# Patient Record
Sex: MALE | Race: WHITE | NOT HISPANIC OR LATINO | Employment: FULL TIME | URBAN - METROPOLITAN AREA
[De-identification: names, ages, dates, MRNs, and addresses within clinical notes are randomized per-mention and may not be internally consistent; named-entity substitution may affect disease eponyms.]

---

## 2018-10-16 ENCOUNTER — OFFICE VISIT (OUTPATIENT)
Dept: INTERNAL MEDICINE | Facility: CLINIC | Age: 43
End: 2018-10-16
Payer: COMMERCIAL

## 2018-10-16 DIAGNOSIS — K21.9 GASTROESOPHAGEAL REFLUX DISEASE, ESOPHAGITIS PRESENCE NOT SPECIFIED: ICD-10-CM

## 2018-10-16 DIAGNOSIS — Z00.00 ROUTINE GENERAL MEDICAL EXAMINATION AT A HEALTH CARE FACILITY: Primary | ICD-10-CM

## 2018-10-16 DIAGNOSIS — J30.9 ALLERGIC RHINITIS, UNSPECIFIED SEASONALITY, UNSPECIFIED TRIGGER: ICD-10-CM

## 2018-10-16 DIAGNOSIS — J06.9 UPPER RESPIRATORY TRACT INFECTION, UNSPECIFIED TYPE: ICD-10-CM

## 2018-10-16 DIAGNOSIS — E66.9 OBESITY (BMI 30-39.9): ICD-10-CM

## 2018-10-16 PROCEDURE — 99999 PR PBB SHADOW E&M-NEW PATIENT-LVL III: CPT | Mod: PBBFAC,,, | Performed by: FAMILY MEDICINE

## 2018-10-16 PROCEDURE — 99386 PREV VISIT NEW AGE 40-64: CPT | Mod: S$GLB,,, | Performed by: FAMILY MEDICINE

## 2018-10-16 PROCEDURE — 3079F DIAST BP 80-89 MM HG: CPT | Mod: CPTII,S$GLB,, | Performed by: FAMILY MEDICINE

## 2018-10-16 PROCEDURE — 3074F SYST BP LT 130 MM HG: CPT | Mod: CPTII,S$GLB,, | Performed by: FAMILY MEDICINE

## 2018-10-16 RX ORDER — OMEPRAZOLE 40 MG/1
40 CAPSULE, DELAYED RELEASE ORAL EVERY MORNING
Qty: 90 CAPSULE | Refills: 3 | Status: SHIPPED | OUTPATIENT
Start: 2018-10-16 | End: 2019-10-07 | Stop reason: SDUPTHER

## 2018-10-16 RX ORDER — ALBUTEROL SULFATE 90 UG/1
2 AEROSOL, METERED RESPIRATORY (INHALATION) EVERY 6 HOURS PRN
Qty: 18 G | Refills: 5 | Status: SHIPPED | OUTPATIENT
Start: 2018-10-16 | End: 2019-10-23

## 2018-10-19 VITALS
HEART RATE: 110 BPM | HEIGHT: 71 IN | RESPIRATION RATE: 18 BRPM | BODY MASS INDEX: 31.89 KG/M2 | WEIGHT: 227.75 LBS | DIASTOLIC BLOOD PRESSURE: 82 MMHG | TEMPERATURE: 99 F | SYSTOLIC BLOOD PRESSURE: 124 MMHG

## 2018-10-19 PROBLEM — E66.9 OBESITY (BMI 30-39.9): Status: ACTIVE | Noted: 2018-10-19

## 2018-10-19 PROBLEM — J30.9 ALLERGIC RHINITIS: Status: ACTIVE | Noted: 2018-10-19

## 2018-10-19 PROBLEM — K21.9 GASTROESOPHAGEAL REFLUX DISEASE: Status: ACTIVE | Noted: 2018-10-19

## 2018-10-19 NOTE — PROGRESS NOTES
Subjective:   Patient ID: Dev Sweeney is a 43 y.o. male.    Chief Complaint: Establish Care; Annual Exam; and Cough      HPI  44 yo male here for annual. Also having cough for a few days    Patient queried and denies any further complaints    PREVENTIVE MED  Diet  Exercise  Colorectal Ca  Alcohol use  Tobacco  BP  Depression  Type 2 DM  Hep C  STD  Vision  ALL REVIEWED      PAST MEDICAL HISTORY:  Past Medical History:   Diagnosis Date    Allergy     Asthma     Hypertension        PAST SURGICAL HISTORY:  Past Surgical History:   Procedure Laterality Date    FRACTURE SURGERY      HERNIA REPAIR      ORBITAL FRACTURE SURGERY      left    VASECTOMY         SOCIAL HISTORY:  Social History     Socioeconomic History    Marital status:      Spouse name: Not on file    Number of children: Not on file    Years of education: Not on file    Highest education level: Not on file   Social Needs    Financial resource strain: Not on file    Food insecurity - worry: Not on file    Food insecurity - inability: Not on file    Transportation needs - medical: Not on file    Transportation needs - non-medical: Not on file   Occupational History    Not on file   Tobacco Use    Smoking status: Current Some Day Smoker     Packs/day: 1.00     Years: 15.00     Pack years: 15.00    Smokeless tobacco: Never Used    Tobacco comment: trying to quit   Substance and Sexual Activity    Alcohol use: Yes     Alcohol/week: 0.6 oz     Types: 1 Shots of liquor per week    Drug use: No    Sexual activity: Not on file   Other Topics Concern    Not on file   Social History Narrative    Not on file       FAMILY HISTORY:  Family History   Problem Relation Age of Onset    Heart disease Mother     Cancer Paternal Grandmother     Diabetes Paternal Grandmother     Cancer Paternal Grandfather     Colon polyps Neg Hx        ALLERGIES AND MEDICATIONS: updated and reviewed.  Review of patient's allergies indicates:    Allergen Reactions    Penicillins Anaphylaxis     Other reaction(s): Hives  Other reaction(s): Difficulty breathing       Current Outpatient Medications:     omeprazole (PRILOSEC) 40 MG capsule, Take 1 capsule (40 mg total) by mouth every morning., Disp: 90 capsule, Rfl: 3    albuterol (PROVENTIL/VENTOLIN HFA) 90 mcg/actuation inhaler, Inhale 2 puffs into the lungs every 6 (six) hours as needed for Wheezing. Rescue, Disp: 18 g, Rfl: 5    Review of Systems   Constitutional: Negative for activity change, appetite change, chills, diaphoresis, fatigue, fever and unexpected weight change.   HENT: Negative for congestion, ear discharge, ear pain, facial swelling, hearing loss, nosebleeds, postnasal drip, rhinorrhea, sinus pressure, sneezing, sore throat, tinnitus, trouble swallowing and voice change.    Eyes: Negative for photophobia, pain, discharge, redness, itching and visual disturbance.   Respiratory: Positive for cough. Negative for chest tightness, shortness of breath and wheezing.    Cardiovascular: Negative for chest pain, palpitations and leg swelling.   Gastrointestinal: Negative for abdominal distention, abdominal pain, anal bleeding, blood in stool, constipation, diarrhea, nausea, rectal pain and vomiting.   Endocrine: Negative for cold intolerance, heat intolerance, polydipsia, polyphagia and polyuria.   Genitourinary: Negative for difficulty urinating, dysuria and flank pain.   Musculoskeletal: Negative for arthralgias, back pain, joint swelling, myalgias and neck pain.   Skin: Negative for rash.   Neurological: Negative for dizziness, tremors, seizures, syncope, speech difficulty, weakness, light-headedness, numbness and headaches.   Psychiatric/Behavioral: Negative for behavioral problems, confusion, decreased concentration, dysphoric mood, sleep disturbance and suicidal ideas. The patient is not nervous/anxious and is not hyperactive.        Objective:     Vitals:    10/16/18 1019   BP: 124/82  "  Pulse: 110   Resp: 18   Temp: 98.8 °F (37.1 °C)   TempSrc: Oral   Weight: 103.3 kg (227 lb 11.8 oz)   Height: 5' 11" (1.803 m)   PainSc: 0-No pain     Body mass index is 31.76 kg/m².    Physical Exam   Constitutional: He appears well-developed and well-nourished.   HENT:   Head: Normocephalic and atraumatic.   Right Ear: Hearing, tympanic membrane, external ear and ear canal normal. No drainage or swelling. No decreased hearing is noted.   Left Ear: Hearing, tympanic membrane, external ear and ear canal normal. No drainage or swelling. No decreased hearing is noted.   Nose: Nose normal. No rhinorrhea.   Mouth/Throat: Oropharynx is clear and moist. No oropharyngeal exudate, posterior oropharyngeal edema or posterior oropharyngeal erythema.   Eyes: Conjunctivae, EOM and lids are normal. Pupils are equal, round, and reactive to light. Right eye exhibits no discharge and no exudate. Left eye exhibits no discharge and no exudate. Right conjunctiva is not injected. Left conjunctiva is not injected.   Neck: Trachea normal and full passive range of motion without pain. Normal carotid pulses, no hepatojugular reflux and no JVD present. Carotid bruit is not present. No neck rigidity. No edema and no erythema present. No thyroid mass and no thyromegaly present.   Cardiovascular: Normal rate, regular rhythm and normal heart sounds.   Pulmonary/Chest: Effort normal. No respiratory distress.   Abdominal: Soft. Normal appearance and bowel sounds are normal. There is no tenderness. There is negative Dow's sign.   Lymphadenopathy:     He has no cervical adenopathy.   Neurological: He is alert.   Skin: Skin is warm and dry.   Psychiatric: He has a normal mood and affect. His speech is normal and behavior is normal.   Nursing note and vitals reviewed.      Assessment and Plan:   Dev was seen today for establish care, annual exam and cough.    Diagnoses and all orders for this visit:    Routine general medical examination at a " health care facility  -     CBC auto differential; Future  -     Comprehensive metabolic panel; Future  -     Hemoglobin A1c; Future  -     Lipid panel; Future  -     TSH; Future  -     T4, free; Future  -     Vitamin D; Future    Allergic rhinitis, unspecified seasonality, unspecified trigger    Upper respiratory tract infection, unspecified type    Gastroesophageal reflux disease, esophagitis presence not specified    Obesity (BMI 30-39.9)    Other orders  -     omeprazole (PRILOSEC) 40 MG capsule; Take 1 capsule (40 mg total) by mouth every morning.  -     albuterol (PROVENTIL/VENTOLIN HFA) 90 mcg/actuation inhaler; Inhale 2 puffs into the lungs every 6 (six) hours as needed for Wheezing. Rescue        Follow-up in about 6 months (around 4/16/2019).    Pt has been given instructions populated from [employer] patient information database and has verbalized understanding of the after-visit summary (AVS) and information contained therein.    THIS NOTE WILL BE SHARED WITH THE PATIENT.

## 2018-10-22 ENCOUNTER — LAB VISIT (OUTPATIENT)
Dept: LAB | Facility: HOSPITAL | Age: 43
End: 2018-10-22
Attending: FAMILY MEDICINE
Payer: COMMERCIAL

## 2018-10-22 DIAGNOSIS — Z00.00 ROUTINE GENERAL MEDICAL EXAMINATION AT A HEALTH CARE FACILITY: ICD-10-CM

## 2018-10-22 LAB
25(OH)D3+25(OH)D2 SERPL-MCNC: 25 NG/ML
ALBUMIN SERPL BCP-MCNC: 3.8 G/DL
ALP SERPL-CCNC: 53 U/L
ALT SERPL W/O P-5'-P-CCNC: 49 U/L
ANION GAP SERPL CALC-SCNC: 8 MMOL/L
AST SERPL-CCNC: 51 U/L
BASOPHILS # BLD AUTO: 0.07 K/UL
BASOPHILS NFR BLD: 1 %
BILIRUB SERPL-MCNC: 0.7 MG/DL
BUN SERPL-MCNC: 8 MG/DL
CALCIUM SERPL-MCNC: 9.5 MG/DL
CHLORIDE SERPL-SCNC: 104 MMOL/L
CHOLEST SERPL-MCNC: 295 MG/DL
CHOLEST/HDLC SERPL: 10.9 {RATIO}
CO2 SERPL-SCNC: 27 MMOL/L
CREAT SERPL-MCNC: 0.9 MG/DL
DIFFERENTIAL METHOD: ABNORMAL
EOSINOPHIL # BLD AUTO: 0.1 K/UL
EOSINOPHIL NFR BLD: 1.3 %
ERYTHROCYTE [DISTWIDTH] IN BLOOD BY AUTOMATED COUNT: 17.3 %
EST. GFR  (AFRICAN AMERICAN): >60 ML/MIN/1.73 M^2
EST. GFR  (NON AFRICAN AMERICAN): >60 ML/MIN/1.73 M^2
ESTIMATED AVG GLUCOSE: 91 MG/DL
GLUCOSE SERPL-MCNC: 93 MG/DL
HBA1C MFR BLD HPLC: 4.8 %
HCT VFR BLD AUTO: 53.1 %
HDLC SERPL-MCNC: 27 MG/DL
HDLC SERPL: 9.2 %
HGB BLD-MCNC: 17 G/DL
IMM GRANULOCYTES # BLD AUTO: 0.02 K/UL
IMM GRANULOCYTES NFR BLD AUTO: 0.3 %
LDLC SERPL CALC-MCNC: 221.2 MG/DL
LYMPHOCYTES # BLD AUTO: 1 K/UL
LYMPHOCYTES NFR BLD: 15.4 %
MCH RBC QN AUTO: 28.2 PG
MCHC RBC AUTO-ENTMCNC: 32 G/DL
MCV RBC AUTO: 88 FL
MONOCYTES # BLD AUTO: 0.6 K/UL
MONOCYTES NFR BLD: 8.6 %
NEUTROPHILS # BLD AUTO: 4.9 K/UL
NEUTROPHILS NFR BLD: 73.4 %
NONHDLC SERPL-MCNC: 268 MG/DL
NRBC BLD-RTO: 0 /100 WBC
PLATELET # BLD AUTO: 339 K/UL
PMV BLD AUTO: 10.2 FL
POTASSIUM SERPL-SCNC: 4.6 MMOL/L
PROT SERPL-MCNC: 7.6 G/DL
RBC # BLD AUTO: 6.02 M/UL
SODIUM SERPL-SCNC: 139 MMOL/L
T4 FREE SERPL-MCNC: 0.66 NG/DL
TRIGL SERPL-MCNC: 234 MG/DL
TSH SERPL DL<=0.005 MIU/L-ACNC: 0.78 UIU/ML
WBC # BLD AUTO: 6.74 K/UL

## 2018-10-22 PROCEDURE — 84439 ASSAY OF FREE THYROXINE: CPT

## 2018-10-22 PROCEDURE — 83036 HEMOGLOBIN GLYCOSYLATED A1C: CPT

## 2018-10-22 PROCEDURE — 80061 LIPID PANEL: CPT

## 2018-10-22 PROCEDURE — 85025 COMPLETE CBC W/AUTO DIFF WBC: CPT

## 2018-10-22 PROCEDURE — 82306 VITAMIN D 25 HYDROXY: CPT

## 2018-10-22 PROCEDURE — 84443 ASSAY THYROID STIM HORMONE: CPT

## 2018-10-22 PROCEDURE — 80053 COMPREHEN METABOLIC PANEL: CPT

## 2018-10-22 PROCEDURE — 36415 COLL VENOUS BLD VENIPUNCTURE: CPT | Mod: PO

## 2018-10-25 ENCOUNTER — TELEPHONE (OUTPATIENT)
Dept: INTERNAL MEDICINE | Facility: CLINIC | Age: 43
End: 2018-10-25

## 2018-10-25 DIAGNOSIS — R79.89 ABNORMAL THYROID BLOOD TEST: Primary | ICD-10-CM

## 2018-10-25 RX ORDER — ROSUVASTATIN CALCIUM 10 MG/1
10 TABLET, COATED ORAL DAILY
Qty: 90 TABLET | Refills: 1 | Status: SHIPPED | OUTPATIENT
Start: 2018-10-25 | End: 2019-04-02 | Stop reason: SDUPTHER

## 2018-10-25 NOTE — TELEPHONE ENCOUNTER
"----- Message from Vincent Ramos MD sent at 10/25/2018  1:30 PM CDT -----  Have not received ROMEL response from pt. Please give him a call. Thx  "Mr. Sweeney, Were these labs done truly fasting? Your LDL cholesterol appears very, very high. One of your thyroid tests was off a bit, too. If these were done fasting, we should definitely consider possible cholesterol meds. Regardless, we need to repeat non-fasting thyroid studies in about a week. Please let me know your thoughts. Thank you. Lindsey "  "

## 2018-10-25 NOTE — TELEPHONE ENCOUNTER
Pt called- he saw message last night late.     He has no objections to starting cholesterol medication.      [place] goes [place] around the 1st,so he will call back and do his thyroid lab when he gets back.

## 2018-10-30 ENCOUNTER — PATIENT MESSAGE (OUTPATIENT)
Dept: INTERNAL MEDICINE | Facility: CLINIC | Age: 43
End: 2018-10-30

## 2018-11-14 ENCOUNTER — LAB VISIT (OUTPATIENT)
Dept: LAB | Facility: HOSPITAL | Age: 43
End: 2018-11-14
Attending: FAMILY MEDICINE
Payer: COMMERCIAL

## 2018-11-14 DIAGNOSIS — R79.89 ABNORMAL THYROID BLOOD TEST: ICD-10-CM

## 2018-11-14 LAB
T4 FREE SERPL-MCNC: 0.92 NG/DL
TSH SERPL DL<=0.005 MIU/L-ACNC: 1.38 UIU/ML

## 2018-11-14 PROCEDURE — 84439 ASSAY OF FREE THYROXINE: CPT

## 2018-11-14 PROCEDURE — 84443 ASSAY THYROID STIM HORMONE: CPT

## 2018-11-14 PROCEDURE — 36415 COLL VENOUS BLD VENIPUNCTURE: CPT | Mod: PO

## 2019-03-29 ENCOUNTER — OFFICE VISIT (OUTPATIENT)
Dept: INTERNAL MEDICINE | Facility: CLINIC | Age: 44
End: 2019-03-29
Payer: COMMERCIAL

## 2019-03-29 VITALS
HEART RATE: 111 BPM | WEIGHT: 231.94 LBS | SYSTOLIC BLOOD PRESSURE: 120 MMHG | RESPIRATION RATE: 14 BRPM | DIASTOLIC BLOOD PRESSURE: 76 MMHG | TEMPERATURE: 98 F | BODY MASS INDEX: 32.35 KG/M2 | OXYGEN SATURATION: 97 %

## 2019-03-29 DIAGNOSIS — J02.9 PHARYNGITIS, UNSPECIFIED ETIOLOGY: Primary | ICD-10-CM

## 2019-03-29 PROCEDURE — 3078F PR MOST RECENT DIASTOLIC BLOOD PRESSURE < 80 MM HG: ICD-10-PCS | Mod: CPTII,S$GLB,, | Performed by: FAMILY MEDICINE

## 2019-03-29 PROCEDURE — 96372 PR INJECTION,THERAP/PROPH/DIAG2ST, IM OR SUBCUT: ICD-10-PCS | Mod: S$GLB,,, | Performed by: FAMILY MEDICINE

## 2019-03-29 PROCEDURE — 3008F BODY MASS INDEX DOCD: CPT | Mod: CPTII,S$GLB,, | Performed by: FAMILY MEDICINE

## 2019-03-29 PROCEDURE — 3074F SYST BP LT 130 MM HG: CPT | Mod: CPTII,S$GLB,, | Performed by: FAMILY MEDICINE

## 2019-03-29 PROCEDURE — 99213 OFFICE O/P EST LOW 20 MIN: CPT | Mod: 25,S$GLB,, | Performed by: FAMILY MEDICINE

## 2019-03-29 PROCEDURE — 3078F DIAST BP <80 MM HG: CPT | Mod: CPTII,S$GLB,, | Performed by: FAMILY MEDICINE

## 2019-03-29 PROCEDURE — 96372 THER/PROPH/DIAG INJ SC/IM: CPT | Mod: S$GLB,,, | Performed by: FAMILY MEDICINE

## 2019-03-29 PROCEDURE — 99213 PR OFFICE/OUTPT VISIT, EST, LEVL III, 20-29 MIN: ICD-10-PCS | Mod: 25,S$GLB,, | Performed by: FAMILY MEDICINE

## 2019-03-29 PROCEDURE — 3074F PR MOST RECENT SYSTOLIC BLOOD PRESSURE < 130 MM HG: ICD-10-PCS | Mod: CPTII,S$GLB,, | Performed by: FAMILY MEDICINE

## 2019-03-29 PROCEDURE — 99999 PR PBB SHADOW E&M-EST. PATIENT-LVL III: ICD-10-PCS | Mod: PBBFAC,,, | Performed by: FAMILY MEDICINE

## 2019-03-29 PROCEDURE — 3008F PR BODY MASS INDEX (BMI) DOCUMENTED: ICD-10-PCS | Mod: CPTII,S$GLB,, | Performed by: FAMILY MEDICINE

## 2019-03-29 PROCEDURE — 99999 PR PBB SHADOW E&M-EST. PATIENT-LVL III: CPT | Mod: PBBFAC,,, | Performed by: FAMILY MEDICINE

## 2019-03-29 RX ORDER — HYDROCODONE BITARTRATE AND ACETAMINOPHEN 7.5; 325 MG/1; MG/1
1 TABLET ORAL EVERY 6 HOURS PRN
Qty: 28 TABLET | Refills: 0 | Status: SHIPPED | OUTPATIENT
Start: 2019-03-29 | End: 2020-02-13 | Stop reason: DRUGHIGH

## 2019-03-29 RX ORDER — METHYLPREDNISOLONE 4 MG/1
TABLET ORAL
Qty: 1 PACKAGE | Refills: 0 | Status: SHIPPED | OUTPATIENT
Start: 2019-03-29 | End: 2019-04-02

## 2019-03-29 RX ORDER — AZITHROMYCIN 250 MG/1
TABLET, FILM COATED ORAL
Qty: 6 TABLET | Refills: 0 | Status: SHIPPED | OUTPATIENT
Start: 2019-03-29 | End: 2019-04-02

## 2019-03-29 RX ORDER — TRIAMCINOLONE ACETONIDE 40 MG/ML
40 INJECTION, SUSPENSION INTRA-ARTICULAR; INTRAMUSCULAR
Status: COMPLETED | OUTPATIENT
Start: 2019-03-29 | End: 2019-03-29

## 2019-03-29 RX ADMIN — TRIAMCINOLONE ACETONIDE 40 MG: 40 INJECTION, SUSPENSION INTRA-ARTICULAR; INTRAMUSCULAR at 02:03

## 2019-03-30 NOTE — PROGRESS NOTES
Subjective:   Patient ID: Dev Sweeney is a 43 y.o. male.    Chief Complaint: Sore Throat      Sore Throat    This is a new problem. The current episode started 1 to 4 weeks ago. The problem has been gradually worsening. Neither side of throat is experiencing more pain than the other. There has been no fever. The pain is at a severity of 7/10. The patient is experiencing no pain. Associated symptoms include congestion, ear pain and a hoarse voice. Pertinent negatives include no abdominal pain, coughing, diarrhea, ear discharge, headaches, neck pain, shortness of breath or vomiting. He has tried nothing for the symptoms. The treatment provided no relief.     42 yo male here for sore throat  Patient queried and denies any further complaints.      ALLERGIES AND MEDICATIONS: updated and reviewed.  Review of patient's allergies indicates:   Allergen Reactions    Penicillins Anaphylaxis     Other reaction(s): Hives  Other reaction(s): Difficulty breathing       Current Outpatient Medications:     albuterol (PROVENTIL/VENTOLIN HFA) 90 mcg/actuation inhaler, Inhale 2 puffs into the lungs every 6 (six) hours as needed for Wheezing. Rescue, Disp: 18 g, Rfl: 5    omeprazole (PRILOSEC) 40 MG capsule, Take 1 capsule (40 mg total) by mouth every morning., Disp: 90 capsule, Rfl: 3    rosuvastatin (CRESTOR) 10 MG tablet, Take 1 tablet (10 mg total) by mouth once daily., Disp: 90 tablet, Rfl: 1    azithromycin (Z-CHET) 250 MG tablet, Take 2 tablets by mouth on day 1; Take 1 tablet by mouth on days 2-5, Disp: 6 tablet, Rfl: 0    HYDROcodone-acetaminophen (NORCO) 7.5-325 mg per tablet, Take 1 tablet by mouth every 6 (six) hours as needed., Disp: 28 tablet, Rfl: 0    methylPREDNISolone (MEDROL DOSEPACK) 4 mg tablet, use as directed, Disp: 1 Package, Rfl: 0    Review of Systems   Constitutional: Negative for activity change, appetite change, chills, diaphoresis, fatigue, fever and unexpected weight change.   HENT: Positive  for congestion, ear pain, hoarse voice and sore throat. Negative for ear discharge, postnasal drip, rhinorrhea and sneezing.    Eyes: Negative for photophobia and discharge.   Respiratory: Negative for cough, chest tightness, shortness of breath and wheezing.    Cardiovascular: Negative for chest pain and palpitations.   Gastrointestinal: Negative for abdominal distention, abdominal pain, diarrhea, nausea and vomiting.   Genitourinary: Negative for dysuria.   Musculoskeletal: Negative for arthralgias and neck pain.   Skin: Negative for rash.   Neurological: Negative for headaches.       Objective:     Vitals:    03/29/19 1355   BP: 120/76   Pulse: (!) 111   Resp: 14   Temp: 98.2 °F (36.8 °C)   TempSrc: Oral   SpO2: 97%   Weight: 105.2 kg (231 lb 14.8 oz)   PainSc: 0-No pain     Body mass index is 32.35 kg/m².    Physical Exam   Constitutional: He appears well-developed and well-nourished.   HENT:   Head: Normocephalic and atraumatic.   Right Ear: Hearing, tympanic membrane, external ear and ear canal normal. No drainage or swelling. No decreased hearing is noted.   Left Ear: Hearing, tympanic membrane, external ear and ear canal normal. No drainage or swelling. No decreased hearing is noted.   Nose: Nose normal. No rhinorrhea.   Mouth/Throat: Oropharynx is clear and moist. No oropharyngeal exudate, posterior oropharyngeal edema or posterior oropharyngeal erythema.   Eyes: Pupils are equal, round, and reactive to light. Conjunctivae, EOM and lids are normal. Right eye exhibits no discharge and no exudate. Left eye exhibits no discharge and no exudate. Right conjunctiva is not injected. Left conjunctiva is not injected.   Neck: Trachea normal and full passive range of motion without pain. Normal carotid pulses, no hepatojugular reflux and no JVD present. Carotid bruit is not present. No neck rigidity. No edema and no erythema present. No thyroid mass and no thyromegaly present.   Cardiovascular: Normal rate, regular  rhythm and normal heart sounds.   Pulmonary/Chest: Effort normal. No respiratory distress.   Abdominal: Soft. Normal appearance and bowel sounds are normal. There is no tenderness. There is negative Dow's sign.   Lymphadenopathy:     He has no cervical adenopathy.   Neurological: He is alert.   Skin: Skin is warm and dry.   Psychiatric: He has a normal mood and affect. His speech is normal and behavior is normal.   Nursing note and vitals reviewed.      Assessment and Plan:   Dev was seen today for sore throat.    Diagnoses and all orders for this visit:    Pharyngitis, unspecified etiology    Other orders  -     triamcinolone acetonide injection 40 mg  -     methylPREDNISolone (MEDROL DOSEPACK) 4 mg tablet; use as directed  -     azithromycin (Z-CHET) 250 MG tablet; Take 2 tablets by mouth on day 1; Take 1 tablet by mouth on days 2-5  -     HYDROcodone-acetaminophen (NORCO) 7.5-325 mg per tablet; Take 1 tablet by mouth every 6 (six) hours as needed.        Follow up in about 2 weeks (around 4/12/2019), or if symptoms worsen or fail to improve.    THIS NOTE WILL BE SHARED WITH THE PATIENT.

## 2019-04-02 ENCOUNTER — PATIENT MESSAGE (OUTPATIENT)
Dept: INTERNAL MEDICINE | Facility: CLINIC | Age: 44
End: 2019-04-02

## 2019-04-02 DIAGNOSIS — R79.89 ABNORMAL TSH: ICD-10-CM

## 2019-04-02 DIAGNOSIS — E78.5 HYPERLIPIDEMIA, UNSPECIFIED HYPERLIPIDEMIA TYPE: Primary | ICD-10-CM

## 2019-04-02 DIAGNOSIS — R79.89 ELEVATED LFTS: ICD-10-CM

## 2019-04-02 RX ORDER — ROSUVASTATIN CALCIUM 10 MG/1
TABLET, COATED ORAL
Qty: 90 TABLET | Refills: 0 | Status: SHIPPED | OUTPATIENT
Start: 2019-04-02 | End: 2019-07-02 | Stop reason: SDUPTHER

## 2019-04-20 ENCOUNTER — PATIENT MESSAGE (OUTPATIENT)
Dept: INTERNAL MEDICINE | Facility: CLINIC | Age: 44
End: 2019-04-20

## 2019-05-01 ENCOUNTER — LAB VISIT (OUTPATIENT)
Dept: LAB | Facility: HOSPITAL | Age: 44
End: 2019-05-01
Attending: FAMILY MEDICINE
Payer: COMMERCIAL

## 2019-05-01 DIAGNOSIS — E78.5 HYPERLIPIDEMIA, UNSPECIFIED HYPERLIPIDEMIA TYPE: ICD-10-CM

## 2019-05-01 DIAGNOSIS — R79.89 ABNORMAL TSH: ICD-10-CM

## 2019-05-01 DIAGNOSIS — R79.89 ELEVATED LFTS: ICD-10-CM

## 2019-05-01 LAB
ALBUMIN SERPL BCP-MCNC: 4.1 G/DL (ref 3.5–5.2)
ALP SERPL-CCNC: 53 U/L (ref 55–135)
ALT SERPL W/O P-5'-P-CCNC: 49 U/L (ref 10–44)
ANION GAP SERPL CALC-SCNC: 10 MMOL/L (ref 8–16)
AST SERPL-CCNC: 39 U/L (ref 10–40)
BILIRUB SERPL-MCNC: 0.5 MG/DL (ref 0.1–1)
BUN SERPL-MCNC: 9 MG/DL (ref 6–20)
CALCIUM SERPL-MCNC: 9.7 MG/DL (ref 8.7–10.5)
CHLORIDE SERPL-SCNC: 104 MMOL/L (ref 95–110)
CHOLEST SERPL-MCNC: 131 MG/DL (ref 120–199)
CHOLEST/HDLC SERPL: 3.2 {RATIO} (ref 2–5)
CO2 SERPL-SCNC: 27 MMOL/L (ref 23–29)
CREAT SERPL-MCNC: 0.9 MG/DL (ref 0.5–1.4)
EST. GFR  (AFRICAN AMERICAN): >60 ML/MIN/1.73 M^2
EST. GFR  (NON AFRICAN AMERICAN): >60 ML/MIN/1.73 M^2
GLUCOSE SERPL-MCNC: 73 MG/DL (ref 70–110)
HAV IGM SERPL QL IA: NEGATIVE
HBV CORE IGM SERPL QL IA: NEGATIVE
HBV SURFACE AG SERPL QL IA: NEGATIVE
HCV AB SERPL QL IA: NEGATIVE
HDLC SERPL-MCNC: 41 MG/DL (ref 40–75)
HDLC SERPL: 31.3 % (ref 20–50)
LDLC SERPL CALC-MCNC: 76.8 MG/DL (ref 63–159)
NONHDLC SERPL-MCNC: 90 MG/DL
POTASSIUM SERPL-SCNC: 4.4 MMOL/L (ref 3.5–5.1)
PROT SERPL-MCNC: 7.4 G/DL (ref 6–8.4)
SODIUM SERPL-SCNC: 141 MMOL/L (ref 136–145)
T4 FREE SERPL-MCNC: 0.8 NG/DL (ref 0.71–1.51)
TRIGL SERPL-MCNC: 66 MG/DL (ref 30–150)
TSH SERPL DL<=0.005 MIU/L-ACNC: 0.55 UIU/ML (ref 0.4–4)

## 2019-05-01 PROCEDURE — 80074 ACUTE HEPATITIS PANEL: CPT

## 2019-05-01 PROCEDURE — 36415 COLL VENOUS BLD VENIPUNCTURE: CPT | Mod: PO

## 2019-05-01 PROCEDURE — 84443 ASSAY THYROID STIM HORMONE: CPT

## 2019-05-01 PROCEDURE — 84439 ASSAY OF FREE THYROXINE: CPT

## 2019-05-01 PROCEDURE — 80053 COMPREHEN METABOLIC PANEL: CPT

## 2019-05-01 PROCEDURE — 80061 LIPID PANEL: CPT

## 2019-07-02 RX ORDER — ROSUVASTATIN CALCIUM 10 MG/1
TABLET, COATED ORAL
Qty: 90 TABLET | Refills: 0 | Status: SHIPPED | OUTPATIENT
Start: 2019-07-02 | End: 2019-10-23 | Stop reason: SDUPTHER

## 2019-07-25 ENCOUNTER — OFFICE VISIT (OUTPATIENT)
Dept: INTERNAL MEDICINE | Facility: CLINIC | Age: 44
End: 2019-07-25
Payer: COMMERCIAL

## 2019-07-25 VITALS
WEIGHT: 220.88 LBS | TEMPERATURE: 98 F | SYSTOLIC BLOOD PRESSURE: 126 MMHG | DIASTOLIC BLOOD PRESSURE: 82 MMHG | HEIGHT: 71 IN | BODY MASS INDEX: 30.92 KG/M2 | HEART RATE: 94 BPM

## 2019-07-25 DIAGNOSIS — E78.00 HYPERCHOLESTEREMIA: Primary | ICD-10-CM

## 2019-07-25 DIAGNOSIS — G47.00 INSOMNIA, UNSPECIFIED TYPE: ICD-10-CM

## 2019-07-25 DIAGNOSIS — E66.9 OBESITY (BMI 30-39.9): ICD-10-CM

## 2019-07-25 DIAGNOSIS — F17.200 TOBACCO DEPENDENCE: ICD-10-CM

## 2019-07-25 PROCEDURE — 3008F PR BODY MASS INDEX (BMI) DOCUMENTED: ICD-10-PCS | Mod: CPTII,S$GLB,, | Performed by: INTERNAL MEDICINE

## 2019-07-25 PROCEDURE — 99999 PR PBB SHADOW E&M-EST. PATIENT-LVL III: ICD-10-PCS | Mod: PBBFAC,,, | Performed by: INTERNAL MEDICINE

## 2019-07-25 PROCEDURE — 3079F PR MOST RECENT DIASTOLIC BLOOD PRESSURE 80-89 MM HG: ICD-10-PCS | Mod: CPTII,S$GLB,, | Performed by: INTERNAL MEDICINE

## 2019-07-25 PROCEDURE — 99214 OFFICE O/P EST MOD 30 MIN: CPT | Mod: S$GLB,,, | Performed by: INTERNAL MEDICINE

## 2019-07-25 PROCEDURE — 99214 PR OFFICE/OUTPT VISIT, EST, LEVL IV, 30-39 MIN: ICD-10-PCS | Mod: S$GLB,,, | Performed by: INTERNAL MEDICINE

## 2019-07-25 PROCEDURE — 3008F BODY MASS INDEX DOCD: CPT | Mod: CPTII,S$GLB,, | Performed by: INTERNAL MEDICINE

## 2019-07-25 PROCEDURE — 3074F PR MOST RECENT SYSTOLIC BLOOD PRESSURE < 130 MM HG: ICD-10-PCS | Mod: CPTII,S$GLB,, | Performed by: INTERNAL MEDICINE

## 2019-07-25 PROCEDURE — 99999 PR PBB SHADOW E&M-EST. PATIENT-LVL III: CPT | Mod: PBBFAC,,, | Performed by: INTERNAL MEDICINE

## 2019-07-25 PROCEDURE — 3079F DIAST BP 80-89 MM HG: CPT | Mod: CPTII,S$GLB,, | Performed by: INTERNAL MEDICINE

## 2019-07-25 PROCEDURE — 3074F SYST BP LT 130 MM HG: CPT | Mod: CPTII,S$GLB,, | Performed by: INTERNAL MEDICINE

## 2019-07-25 RX ORDER — TRAZODONE HYDROCHLORIDE 100 MG/1
100 TABLET ORAL NIGHTLY PRN
Qty: 30 TABLET | Refills: 3 | Status: SHIPPED | OUTPATIENT
Start: 2019-07-25 | End: 2019-10-21 | Stop reason: SDUPTHER

## 2019-07-25 NOTE — PROGRESS NOTES
Subjective:       Patient ID: Dev Sweeney is a 44 y.o. male.    Chief Complaint: Follow-up (6 month )    HPI   Pt with HLD, Obesity, Tobacco use is here for establishment. No acute complaints today except for mild insomnia at times.   Review of Systems   Constitutional: Negative for activity change, appetite change, chills, diaphoresis, fatigue, fever and unexpected weight change.   HENT: Negative for hearing loss, postnasal drip, rhinorrhea, sinus pressure, sneezing, sore throat, trouble swallowing and voice change.    Eyes: Negative for discharge and visual disturbance.   Respiratory: Negative for cough, chest tightness, shortness of breath and wheezing.    Cardiovascular: Negative for chest pain, palpitations and leg swelling.   Gastrointestinal: Negative for abdominal pain, blood in stool, constipation, diarrhea, nausea and vomiting.   Endocrine: Negative for polydipsia and polyuria.   Genitourinary: Negative for difficulty urinating, dysuria, hematuria and urgency.   Musculoskeletal: Negative for arthralgias, joint swelling, myalgias and neck pain.   Skin: Negative for rash and wound.   Allergic/Immunologic: Negative for environmental allergies and food allergies.   Neurological: Negative for weakness and headaches.   Hematological: Negative for adenopathy. Does not bruise/bleed easily.   Psychiatric/Behavioral: Positive for sleep disturbance. Negative for confusion, dysphoric mood, self-injury and suicidal ideas.       Objective:      Physical Exam   Constitutional: He is oriented to person, place, and time. He appears well-developed and well-nourished. No distress.   HENT:   Head: Normocephalic and atraumatic.   Right Ear: External ear normal.   Left Ear: External ear normal.   Nose: Nose normal.   Mouth/Throat: Oropharynx is clear and moist. No oropharyngeal exudate.   Eyes: Pupils are equal, round, and reactive to light. Conjunctivae and EOM are normal. Right eye exhibits no discharge. Left eye  exhibits no discharge. No scleral icterus.   Neck: Normal range of motion. Neck supple. No JVD present.   Cardiovascular: Normal rate, regular rhythm and normal heart sounds.   No murmur heard.  Pulmonary/Chest: Effort normal and breath sounds normal. No respiratory distress. He has no wheezes. He has no rales.   Abdominal: Soft. Bowel sounds are normal. He exhibits no mass.   Musculoskeletal: Normal range of motion. He exhibits no edema.   Lymphadenopathy:     He has no cervical adenopathy.   Neurological: He is alert and oriented to person, place, and time. He exhibits normal muscle tone.   Skin: Skin is warm and dry. No rash noted. He is not diaphoretic. No pallor.   Psychiatric: He has a normal mood and affect.   Nursing note and vitals reviewed.      Assessment:       1. Hypercholesteremia    2. Obesity (BMI 30-39.9)    3. Tobacco dependence    4. Insomnia, unspecified type        Plan:    1. HLD- controlled on Crestor 10 mg daily    2. Obesity- pt advised on proper diet/exercise for weight loss   3. Tobacco use- cessation advised   4. Insomnia- Rx Trazodone 100 mg qHS PRN

## 2019-08-15 RX ORDER — ALBUTEROL SULFATE 90 UG/1
2 AEROSOL, METERED RESPIRATORY (INHALATION) EVERY 6 HOURS PRN
Qty: 8.5 INHALER | Refills: 5 | OUTPATIENT
Start: 2019-08-15 | End: 2020-08-14

## 2019-09-27 RX ORDER — ROSUVASTATIN CALCIUM 10 MG/1
TABLET, COATED ORAL
Qty: 90 TABLET | Refills: 0 | OUTPATIENT
Start: 2019-09-27

## 2019-09-29 RX ORDER — OMEPRAZOLE 40 MG/1
CAPSULE, DELAYED RELEASE ORAL
Qty: 90 CAPSULE | Refills: 3 | OUTPATIENT
Start: 2019-09-29

## 2019-10-07 RX ORDER — OMEPRAZOLE 40 MG/1
40 CAPSULE, DELAYED RELEASE ORAL EVERY MORNING
Qty: 90 CAPSULE | Refills: 0 | Status: SHIPPED | OUTPATIENT
Start: 2019-10-07 | End: 2020-01-05

## 2019-10-15 ENCOUNTER — PATIENT MESSAGE (OUTPATIENT)
Dept: INTERNAL MEDICINE | Facility: CLINIC | Age: 44
End: 2019-10-15

## 2019-10-15 DIAGNOSIS — Z00.00 ROUTINE GENERAL MEDICAL EXAMINATION AT A HEALTH CARE FACILITY: Primary | ICD-10-CM

## 2019-10-21 RX ORDER — TRAZODONE HYDROCHLORIDE 100 MG/1
100 TABLET ORAL NIGHTLY PRN
Qty: 90 TABLET | Refills: 1 | Status: SHIPPED | OUTPATIENT
Start: 2019-10-21 | End: 2020-04-14 | Stop reason: SDUPTHER

## 2019-10-22 RX ORDER — ROSUVASTATIN CALCIUM 10 MG/1
TABLET, COATED ORAL
Qty: 90 TABLET | Refills: 0 | OUTPATIENT
Start: 2019-10-22

## 2019-10-23 ENCOUNTER — LAB VISIT (OUTPATIENT)
Dept: LAB | Facility: HOSPITAL | Age: 44
End: 2019-10-23
Attending: INTERNAL MEDICINE
Payer: COMMERCIAL

## 2019-10-23 ENCOUNTER — OFFICE VISIT (OUTPATIENT)
Dept: INTERNAL MEDICINE | Facility: CLINIC | Age: 44
End: 2019-10-23
Payer: COMMERCIAL

## 2019-10-23 VITALS
BODY MASS INDEX: 30.86 KG/M2 | HEIGHT: 71 IN | RESPIRATION RATE: 16 BRPM | DIASTOLIC BLOOD PRESSURE: 84 MMHG | HEART RATE: 93 BPM | WEIGHT: 220.44 LBS | TEMPERATURE: 99 F | SYSTOLIC BLOOD PRESSURE: 132 MMHG

## 2019-10-23 DIAGNOSIS — E78.00 HYPERCHOLESTEREMIA: ICD-10-CM

## 2019-10-23 DIAGNOSIS — F17.200 TOBACCO DEPENDENCE: ICD-10-CM

## 2019-10-23 DIAGNOSIS — Z00.00 ROUTINE GENERAL MEDICAL EXAMINATION AT A HEALTH CARE FACILITY: ICD-10-CM

## 2019-10-23 DIAGNOSIS — E66.9 OBESITY (BMI 30-39.9): ICD-10-CM

## 2019-10-23 DIAGNOSIS — Z00.00 ANNUAL PHYSICAL EXAM: Primary | ICD-10-CM

## 2019-10-23 DIAGNOSIS — G47.00 INSOMNIA, UNSPECIFIED TYPE: ICD-10-CM

## 2019-10-23 LAB
25(OH)D3+25(OH)D2 SERPL-MCNC: 36 NG/ML (ref 30–96)
ALBUMIN SERPL BCP-MCNC: 4.3 G/DL (ref 3.5–5.2)
ALP SERPL-CCNC: 49 U/L (ref 55–135)
ALT SERPL W/O P-5'-P-CCNC: 39 U/L (ref 10–44)
ANION GAP SERPL CALC-SCNC: 8 MMOL/L (ref 8–16)
AST SERPL-CCNC: 47 U/L (ref 10–40)
BASOPHILS # BLD AUTO: 0.07 K/UL (ref 0–0.2)
BASOPHILS NFR BLD: 0.8 % (ref 0–1.9)
BILIRUB SERPL-MCNC: 0.7 MG/DL (ref 0.1–1)
BUN SERPL-MCNC: 9 MG/DL (ref 6–20)
CALCIUM SERPL-MCNC: 9.7 MG/DL (ref 8.7–10.5)
CHLORIDE SERPL-SCNC: 103 MMOL/L (ref 95–110)
CHOLEST SERPL-MCNC: 137 MG/DL (ref 120–199)
CHOLEST/HDLC SERPL: 2.9 {RATIO} (ref 2–5)
CO2 SERPL-SCNC: 30 MMOL/L (ref 23–29)
CREAT SERPL-MCNC: 0.8 MG/DL (ref 0.5–1.4)
DIFFERENTIAL METHOD: ABNORMAL
EOSINOPHIL # BLD AUTO: 0.1 K/UL (ref 0–0.5)
EOSINOPHIL NFR BLD: 1.7 % (ref 0–8)
ERYTHROCYTE [DISTWIDTH] IN BLOOD BY AUTOMATED COUNT: 17.4 % (ref 11.5–14.5)
EST. GFR  (AFRICAN AMERICAN): >60 ML/MIN/1.73 M^2
EST. GFR  (NON AFRICAN AMERICAN): >60 ML/MIN/1.73 M^2
GLUCOSE SERPL-MCNC: 79 MG/DL (ref 70–110)
HCT VFR BLD AUTO: 58.7 % (ref 40–54)
HDLC SERPL-MCNC: 47 MG/DL (ref 40–75)
HDLC SERPL: 34.3 % (ref 20–50)
HGB BLD-MCNC: 18.7 G/DL (ref 14–18)
IMM GRANULOCYTES # BLD AUTO: 0.02 K/UL (ref 0–0.04)
IMM GRANULOCYTES NFR BLD AUTO: 0.2 % (ref 0–0.5)
LDLC SERPL CALC-MCNC: 75.2 MG/DL (ref 63–159)
LYMPHOCYTES # BLD AUTO: 1.5 K/UL (ref 1–4.8)
LYMPHOCYTES NFR BLD: 18.4 % (ref 18–48)
MCH RBC QN AUTO: 29 PG (ref 27–31)
MCHC RBC AUTO-ENTMCNC: 31.9 G/DL (ref 32–36)
MCV RBC AUTO: 91 FL (ref 82–98)
MONOCYTES # BLD AUTO: 0.9 K/UL (ref 0.3–1)
MONOCYTES NFR BLD: 10.9 % (ref 4–15)
NEUTROPHILS # BLD AUTO: 5.6 K/UL (ref 1.8–7.7)
NEUTROPHILS NFR BLD: 68 % (ref 38–73)
NONHDLC SERPL-MCNC: 90 MG/DL
NRBC BLD-RTO: 0 /100 WBC
PLATELET # BLD AUTO: 235 K/UL (ref 150–350)
PMV BLD AUTO: 10.2 FL (ref 9.2–12.9)
POTASSIUM SERPL-SCNC: 4.7 MMOL/L (ref 3.5–5.1)
PROT SERPL-MCNC: 7.2 G/DL (ref 6–8.4)
RBC # BLD AUTO: 6.45 M/UL (ref 4.6–6.2)
SODIUM SERPL-SCNC: 141 MMOL/L (ref 136–145)
TRIGL SERPL-MCNC: 74 MG/DL (ref 30–150)
TSH SERPL DL<=0.005 MIU/L-ACNC: 0.98 UIU/ML (ref 0.4–4)
WBC # BLD AUTO: 8.24 K/UL (ref 3.9–12.7)

## 2019-10-23 PROCEDURE — 3075F PR MOST RECENT SYSTOLIC BLOOD PRESS GE 130-139MM HG: ICD-10-PCS | Mod: CPTII,S$GLB,, | Performed by: INTERNAL MEDICINE

## 2019-10-23 PROCEDURE — 90471 IMMUNIZATION ADMIN: CPT | Mod: S$GLB,,, | Performed by: INTERNAL MEDICINE

## 2019-10-23 PROCEDURE — 90686 FLU VACCINE (QUAD) GREATER THAN OR EQUAL TO 3YO PRESERVATIVE FREE IM: ICD-10-PCS | Mod: S$GLB,,, | Performed by: INTERNAL MEDICINE

## 2019-10-23 PROCEDURE — 99396 PR PREVENTIVE VISIT,EST,40-64: ICD-10-PCS | Mod: 25,S$GLB,, | Performed by: INTERNAL MEDICINE

## 2019-10-23 PROCEDURE — 36415 COLL VENOUS BLD VENIPUNCTURE: CPT | Mod: PO

## 2019-10-23 PROCEDURE — 3079F PR MOST RECENT DIASTOLIC BLOOD PRESSURE 80-89 MM HG: ICD-10-PCS | Mod: CPTII,S$GLB,, | Performed by: INTERNAL MEDICINE

## 2019-10-23 PROCEDURE — 90686 IIV4 VACC NO PRSV 0.5 ML IM: CPT | Mod: S$GLB,,, | Performed by: INTERNAL MEDICINE

## 2019-10-23 PROCEDURE — 82306 VITAMIN D 25 HYDROXY: CPT

## 2019-10-23 PROCEDURE — 3075F SYST BP GE 130 - 139MM HG: CPT | Mod: CPTII,S$GLB,, | Performed by: INTERNAL MEDICINE

## 2019-10-23 PROCEDURE — 99999 PR PBB SHADOW E&M-EST. PATIENT-LVL III: CPT | Mod: PBBFAC,,, | Performed by: INTERNAL MEDICINE

## 2019-10-23 PROCEDURE — 99999 PR PBB SHADOW E&M-EST. PATIENT-LVL III: ICD-10-PCS | Mod: PBBFAC,,, | Performed by: INTERNAL MEDICINE

## 2019-10-23 PROCEDURE — 99396 PREV VISIT EST AGE 40-64: CPT | Mod: 25,S$GLB,, | Performed by: INTERNAL MEDICINE

## 2019-10-23 PROCEDURE — 3079F DIAST BP 80-89 MM HG: CPT | Mod: CPTII,S$GLB,, | Performed by: INTERNAL MEDICINE

## 2019-10-23 PROCEDURE — 80053 COMPREHEN METABOLIC PANEL: CPT

## 2019-10-23 PROCEDURE — 85025 COMPLETE CBC W/AUTO DIFF WBC: CPT

## 2019-10-23 PROCEDURE — 80061 LIPID PANEL: CPT

## 2019-10-23 PROCEDURE — 84443 ASSAY THYROID STIM HORMONE: CPT

## 2019-10-23 PROCEDURE — 90471 FLU VACCINE (QUAD) GREATER THAN OR EQUAL TO 3YO PRESERVATIVE FREE IM: ICD-10-PCS | Mod: S$GLB,,, | Performed by: INTERNAL MEDICINE

## 2019-10-23 RX ORDER — ALBUTEROL SULFATE 90 UG/1
2 AEROSOL, METERED RESPIRATORY (INHALATION) EVERY 6 HOURS PRN
Qty: 18 G | Refills: 11 | Status: SHIPPED | OUTPATIENT
Start: 2019-10-23 | End: 2021-01-13

## 2019-10-23 RX ORDER — ROSUVASTATIN CALCIUM 10 MG/1
10 TABLET, COATED ORAL DAILY
Qty: 90 TABLET | Refills: 3 | Status: SHIPPED | OUTPATIENT
Start: 2019-10-23 | End: 2020-09-24

## 2019-10-23 NOTE — PROGRESS NOTES
Subjective:       Patient ID: Dev Sweeney is a 44 y.o. male.    Chief Complaint: Annual Exam (fasting for labs); Flu Vaccine; and Medication Refill    HPI   44 y.o. Male here for annual exam.     Vaccines: Influenza (2019); Tetanus (2015)  Eye exam: declined    Exercise:  Diet: regular    Past Medical History:  No date: Allergy  No date: Asthma  No date: GERD (gastroesophageal reflux disease)  No date: Hyperlipidemia  No date: Hypertension  No date: Insomnia  Past Surgical History:  No date: FRACTURE SURGERY  No date: HERNIA REPAIR  No date: ORBITAL FRACTURE SURGERY      Comment:  left  No date: VASECTOMY  Social History    Socioeconomic History      Marital status:       Spouse name: Not on file      Number of children: Not on file      Years of education: Not on file      Highest education level: Not on file    Occupational History      Not on file    Social Needs      Financial resource strain: Not on file      Food insecurity:        Worry: Not on file        Inability: Not on file      Transportation needs:        Medical: Not on file        Non-medical: Not on file    Tobacco Use      Smoking status: Current Some Day Smoker        Packs/day: 1.00        Years: 15.00        Pack years: 15      Smokeless tobacco: Never Used      Tobacco comment: trying to quit    Substance and Sexual Activity      Alcohol use: Yes        Alcohol/week: 1.0 standard drinks        Types: 1 Shots of liquor per week      Drug use: No      Sexual activity: Not on file    Lifestyle      Physical activity:        Days per week: 5 days        Minutes per session: 30 min      Stress: To some extent    Relationships      Social connections:        Talks on phone: Patient refused        Gets together: Patient refused        Attends Sabianism service: Not on file        Active member of club or organization: No        Attends meetings of clubs or organizations: Patient refused        Relationship status:     Other  Topics      Concerns:        Not on file    Social History Narrative      Not on file    Review of patient's allergies indicates:   -- Penicillins -- Anaphylaxis    --  Other reaction(s): Hives             Other reaction(s): Difficulty breathing  Dev Sweeney had no medications administered during this visit.    Review of Systems   Constitutional: Negative for activity change, appetite change, chills, diaphoresis, fatigue, fever and unexpected weight change.   HENT: Negative for congestion, mouth sores, postnasal drip, rhinorrhea, sinus pressure, sneezing, sore throat, trouble swallowing and voice change.    Eyes: Negative for discharge, itching and visual disturbance.   Respiratory: Negative for cough, chest tightness, shortness of breath and wheezing.    Cardiovascular: Negative for chest pain, palpitations and leg swelling.   Gastrointestinal: Negative for abdominal pain, blood in stool, constipation, diarrhea, nausea and vomiting.   Endocrine: Negative for cold intolerance and heat intolerance.   Genitourinary: Negative for difficulty urinating, dysuria, flank pain, hematuria and urgency.   Musculoskeletal: Negative for arthralgias, back pain, myalgias and neck pain.   Skin: Negative for rash and wound.   Allergic/Immunologic: Negative for environmental allergies and food allergies.   Neurological: Negative for dizziness, tremors, seizures, syncope, weakness and headaches.   Hematological: Negative for adenopathy. Does not bruise/bleed easily.   Psychiatric/Behavioral: Positive for sleep disturbance. Negative for confusion, self-injury and suicidal ideas. The patient is not nervous/anxious.        Objective:      Physical Exam   Constitutional: He is oriented to person, place, and time. He appears well-developed and well-nourished. No distress.   HENT:   Head: Normocephalic and atraumatic.   Right Ear: External ear normal.   Left Ear: External ear normal.   Nose: Nose normal.   Mouth/Throat: Oropharynx is  clear and moist. No oropharyngeal exudate.   Eyes: Pupils are equal, round, and reactive to light. Conjunctivae and EOM are normal. Right eye exhibits no discharge. Left eye exhibits no discharge. No scleral icterus.   Neck: Normal range of motion. Neck supple. No JVD present. No thyromegaly present.   Cardiovascular: Normal rate, regular rhythm, normal heart sounds and intact distal pulses.   No murmur heard.  Pulmonary/Chest: Effort normal and breath sounds normal. No respiratory distress. He has no wheezes. He has no rales.   Abdominal: Soft. Bowel sounds are normal. He exhibits no distension. There is no tenderness. There is no guarding.   Musculoskeletal: He exhibits no edema.   Lymphadenopathy:     He has no cervical adenopathy.   Neurological: He is alert and oriented to person, place, and time.   Skin: Skin is warm and dry. No rash noted. He is not diaphoretic. No pallor.   Psychiatric: He has a normal mood and affect. Judgment normal.       Assessment:       1. Annual physical exam    2. Hypercholesteremia    3. Obesity (BMI 30-39.9)    4. Tobacco dependence    5. Insomnia, unspecified type        Plan:    1. Blood work ordered        Vaccines: Influenza (2019); Tetanus (2015)       Eye exam: declined   2. HLD- controlled on Crestor 10 mg daily    3. Obesity- pt advised on proper diet/exercise for weight loss   4. Tobacco use- cessation advised   5. Insomnia- stable on Trazodone 100 mg qHS PRN   6. F/u in 1 yr

## 2019-11-05 ENCOUNTER — PATIENT MESSAGE (OUTPATIENT)
Dept: INTERNAL MEDICINE | Facility: CLINIC | Age: 44
End: 2019-11-05

## 2019-11-06 NOTE — TELEPHONE ENCOUNTER
Got the fax for your wellness form.    I have it all filled out.  Dr llanos will return Monday to sign and I will fax it off then.

## 2019-12-02 ENCOUNTER — PATIENT MESSAGE (OUTPATIENT)
Dept: INTERNAL MEDICINE | Facility: CLINIC | Age: 44
End: 2019-12-02

## 2019-12-02 NOTE — TELEPHONE ENCOUNTER
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 I am have trouble focusing on the task at hand when needed. Is there anything you can prescribe to help with this?    Dev Sweeney

## 2019-12-03 ENCOUNTER — PATIENT MESSAGE (OUTPATIENT)
Dept: INTERNAL MEDICINE | Facility: CLINIC | Age: 44
End: 2019-12-03

## 2019-12-03 DIAGNOSIS — R41.840 DIFFICULTY CONCENTRATING: Primary | ICD-10-CM

## 2019-12-03 NOTE — TELEPHONE ENCOUNTER
You would need to be evaluated by Psychology regarding testing to determine if you have ADD before we can start treatment which is usually with stimulants. Would you like a referral to get tested ?

## 2019-12-04 ENCOUNTER — PATIENT MESSAGE (OUTPATIENT)
Dept: INTERNAL MEDICINE | Facility: CLINIC | Age: 44
End: 2019-12-04

## 2019-12-04 NOTE — TELEPHONE ENCOUNTER
Referral to Psyc for ADD evaluation   Give pt the # to call  If too long he needs to find outside Psyc and we can refer to them

## 2020-01-05 RX ORDER — OMEPRAZOLE 40 MG/1
CAPSULE, DELAYED RELEASE ORAL
Qty: 90 CAPSULE | Refills: 0 | Status: SHIPPED | OUTPATIENT
Start: 2020-01-05 | End: 2020-04-04 | Stop reason: SDUPTHER

## 2020-02-13 ENCOUNTER — OFFICE VISIT (OUTPATIENT)
Dept: INTERNAL MEDICINE | Facility: CLINIC | Age: 45
End: 2020-02-13
Payer: COMMERCIAL

## 2020-02-13 VITALS
TEMPERATURE: 98 F | WEIGHT: 222.44 LBS | HEIGHT: 71 IN | HEART RATE: 76 BPM | RESPIRATION RATE: 18 BRPM | BODY MASS INDEX: 31.14 KG/M2 | SYSTOLIC BLOOD PRESSURE: 124 MMHG | DIASTOLIC BLOOD PRESSURE: 80 MMHG

## 2020-02-13 DIAGNOSIS — K44.9 HERNIA, HIATAL: Primary | ICD-10-CM

## 2020-02-13 DIAGNOSIS — R14.2 BURPING: ICD-10-CM

## 2020-02-13 DIAGNOSIS — J06.9 VIRAL UPPER RESPIRATORY INFECTION: ICD-10-CM

## 2020-02-13 PROCEDURE — 3008F BODY MASS INDEX DOCD: CPT | Mod: CPTII,S$GLB,, | Performed by: INTERNAL MEDICINE

## 2020-02-13 PROCEDURE — 3079F PR MOST RECENT DIASTOLIC BLOOD PRESSURE 80-89 MM HG: ICD-10-PCS | Mod: CPTII,S$GLB,, | Performed by: INTERNAL MEDICINE

## 2020-02-13 PROCEDURE — 99214 OFFICE O/P EST MOD 30 MIN: CPT | Mod: S$GLB,,, | Performed by: INTERNAL MEDICINE

## 2020-02-13 PROCEDURE — 3074F PR MOST RECENT SYSTOLIC BLOOD PRESSURE < 130 MM HG: ICD-10-PCS | Mod: CPTII,S$GLB,, | Performed by: INTERNAL MEDICINE

## 2020-02-13 PROCEDURE — 3008F PR BODY MASS INDEX (BMI) DOCUMENTED: ICD-10-PCS | Mod: CPTII,S$GLB,, | Performed by: INTERNAL MEDICINE

## 2020-02-13 PROCEDURE — 99999 PR PBB SHADOW E&M-EST. PATIENT-LVL III: ICD-10-PCS | Mod: PBBFAC,,, | Performed by: INTERNAL MEDICINE

## 2020-02-13 PROCEDURE — 3079F DIAST BP 80-89 MM HG: CPT | Mod: CPTII,S$GLB,, | Performed by: INTERNAL MEDICINE

## 2020-02-13 PROCEDURE — 99999 PR PBB SHADOW E&M-EST. PATIENT-LVL III: CPT | Mod: PBBFAC,,, | Performed by: INTERNAL MEDICINE

## 2020-02-13 PROCEDURE — 3074F SYST BP LT 130 MM HG: CPT | Mod: CPTII,S$GLB,, | Performed by: INTERNAL MEDICINE

## 2020-02-13 PROCEDURE — 99214 PR OFFICE/OUTPT VISIT, EST, LEVL IV, 30-39 MIN: ICD-10-PCS | Mod: S$GLB,,, | Performed by: INTERNAL MEDICINE

## 2020-02-13 RX ORDER — CODEINE PHOSPHATE AND GUAIFENESIN 10; 100 MG/5ML; MG/5ML
5 SOLUTION ORAL 3 TIMES DAILY PRN
Qty: 236 ML | Refills: 0 | Status: SHIPPED | OUTPATIENT
Start: 2020-02-13 | End: 2020-02-23

## 2020-02-13 RX ORDER — HYDROCODONE BITARTRATE AND ACETAMINOPHEN 10; 325 MG/1; MG/1
TABLET ORAL
COMMUNITY
Start: 2020-02-10 | End: 2021-10-13

## 2020-02-13 RX ORDER — LEVOCETIRIZINE DIHYDROCHLORIDE 5 MG/1
5 TABLET, FILM COATED ORAL NIGHTLY
Qty: 30 TABLET | Refills: 11 | Status: SHIPPED | OUTPATIENT
Start: 2020-02-13 | End: 2022-06-21

## 2020-02-13 RX ORDER — FLUTICASONE PROPIONATE 50 MCG
2 SPRAY, SUSPENSION (ML) NASAL DAILY
Qty: 16 G | Refills: 12 | Status: SHIPPED | OUTPATIENT
Start: 2020-02-13 | End: 2020-03-14

## 2020-02-13 NOTE — PROGRESS NOTES
Subjective:       Patient ID: Dev Sweeney is a 44 y.o. male.    Chief Complaint: Hiatal Hernia (repair 15 years ago, he thinks it's back, hiccups, gagging, food coming up after eating); Cough (green mucus); and Nasal Congestion    HPI   Pt with hx of hiatal hernia repair 15 yrs ago is here for evaluation of persistent symptoms of burping, hiccups and at times some food will come back up. He wonders if the hernia has returned. CT scan done back in 2012(after surgery) showed moderate sized hiatal hernia.     Pt also c/o 5 wks of persistent mostly dry cough a/s sinus congestion, post nasal drip. No fevers/chills. He has not tried any OTC meds for relief.   Review of Systems   Constitutional: Negative for activity change, appetite change, chills, diaphoresis, fatigue, fever and unexpected weight change.   HENT: Negative for hearing loss, postnasal drip, rhinorrhea, sinus pressure, sneezing, sore throat, trouble swallowing and voice change.    Eyes: Negative for discharge and visual disturbance.   Respiratory: Negative for cough, chest tightness, shortness of breath and wheezing.    Cardiovascular: Negative for chest pain, palpitations and leg swelling.   Gastrointestinal: Negative for abdominal pain, blood in stool, constipation, diarrhea, nausea and vomiting.   Endocrine: Negative for polydipsia and polyuria.   Genitourinary: Negative for difficulty urinating, dysuria, hematuria and urgency.   Musculoskeletal: Negative for arthralgias, joint swelling, myalgias and neck pain.   Skin: Negative for rash and wound.   Allergic/Immunologic: Negative for environmental allergies and food allergies.   Neurological: Negative for weakness and headaches.   Hematological: Negative for adenopathy. Does not bruise/bleed easily.   Psychiatric/Behavioral: Negative for confusion and dysphoric mood.       Objective:      Physical Exam   Constitutional: He is oriented to person, place, and time. He appears well-developed and  well-nourished. No distress.   HENT:   Head: Normocephalic and atraumatic.   Eyes: Pupils are equal, round, and reactive to light. Conjunctivae and EOM are normal. Right eye exhibits no discharge. Left eye exhibits no discharge. No scleral icterus.   Neck: Normal range of motion. Neck supple. No JVD present.   Cardiovascular: Normal rate, regular rhythm and normal heart sounds.   No murmur heard.  Pulmonary/Chest: Effort normal and breath sounds normal. No respiratory distress. He has no wheezes. He has no rales.   Musculoskeletal: He exhibits no edema.   Lymphadenopathy:     He has no cervical adenopathy.   Neurological: He is alert and oriented to person, place, and time.   Skin: Skin is warm and dry. No rash noted. He is not diaphoretic. No pallor.       Assessment:       1. Hernia, hiatal    2. Burping    3. Viral upper respiratory infection        Plan:    1/2. Referral to GI for evaluation    3. Rx Xyzal/Flonase/Cheratussin AC TID PRN

## 2020-03-05 ENCOUNTER — PATIENT MESSAGE (OUTPATIENT)
Dept: GASTROENTEROLOGY | Facility: CLINIC | Age: 45
End: 2020-03-05

## 2020-03-05 ENCOUNTER — PATIENT MESSAGE (OUTPATIENT)
Dept: INTERNAL MEDICINE | Facility: CLINIC | Age: 45
End: 2020-03-05

## 2020-03-06 ENCOUNTER — PATIENT OUTREACH (OUTPATIENT)
Dept: ADMINISTRATIVE | Facility: OTHER | Age: 45
End: 2020-03-06

## 2020-03-11 ENCOUNTER — OFFICE VISIT (OUTPATIENT)
Dept: GASTROENTEROLOGY | Facility: CLINIC | Age: 45
End: 2020-03-11
Payer: COMMERCIAL

## 2020-03-11 ENCOUNTER — TELEPHONE (OUTPATIENT)
Dept: GASTROENTEROLOGY | Facility: CLINIC | Age: 45
End: 2020-03-11

## 2020-03-11 VITALS — WEIGHT: 217.63 LBS | HEIGHT: 71 IN | BODY MASS INDEX: 30.47 KG/M2

## 2020-03-11 DIAGNOSIS — R11.10 VOMITING, INTRACTABILITY OF VOMITING NOT SPECIFIED, PRESENCE OF NAUSEA NOT SPECIFIED, UNSPECIFIED VOMITING TYPE: Primary | ICD-10-CM

## 2020-03-11 DIAGNOSIS — K44.9 HERNIA, HIATAL: ICD-10-CM

## 2020-03-11 DIAGNOSIS — R14.2 BURPING: ICD-10-CM

## 2020-03-11 DIAGNOSIS — R07.9 CHEST PAIN, UNSPECIFIED TYPE: ICD-10-CM

## 2020-03-11 PROCEDURE — 99999 PR PBB SHADOW E&M-EST. PATIENT-LVL III: ICD-10-PCS | Mod: PBBFAC,,, | Performed by: INTERNAL MEDICINE

## 2020-03-11 PROCEDURE — 99999 PR PBB SHADOW E&M-EST. PATIENT-LVL III: CPT | Mod: PBBFAC,,, | Performed by: INTERNAL MEDICINE

## 2020-03-11 PROCEDURE — 3008F PR BODY MASS INDEX (BMI) DOCUMENTED: ICD-10-PCS | Mod: CPTII,S$GLB,, | Performed by: INTERNAL MEDICINE

## 2020-03-11 PROCEDURE — 99204 PR OFFICE/OUTPT VISIT, NEW, LEVL IV, 45-59 MIN: ICD-10-PCS | Mod: S$GLB,,, | Performed by: INTERNAL MEDICINE

## 2020-03-11 PROCEDURE — 99204 OFFICE O/P NEW MOD 45 MIN: CPT | Mod: S$GLB,,, | Performed by: INTERNAL MEDICINE

## 2020-03-11 PROCEDURE — 3008F BODY MASS INDEX DOCD: CPT | Mod: CPTII,S$GLB,, | Performed by: INTERNAL MEDICINE

## 2020-03-11 RX ORDER — DICYCLOMINE HYDROCHLORIDE 10 MG/1
10 CAPSULE ORAL
Qty: 60 CAPSULE | Refills: 0 | Status: SHIPPED | OUTPATIENT
Start: 2020-03-11 | End: 2020-04-10

## 2020-03-11 NOTE — PATIENT INSTRUCTIONS
EGD Prep Instructions    [facility] [address] [place] [address]    You are scheduled for an EGD with Dr. Shrestha on 4/2/20 at [facility] located at [address] [place] [address].  Check in at the admit desk, first floor of the hospital (which is the building on the left).   You will receive a call 2-3 days before your EGD to tell you the time to arrive.  If you have not received a call by the day before your procedure, call the Endoscopy Lab at [phone].    Nothing to eat or drink after midnight before the procedure.  You MAY brush your teeth.    You MAY take your blood pressure, heart, and seizure medication on the morning of the procedure, with a SIP of water.  Hold ALL other medications until after the procedure.    If you are on blood thinners THAT YOU HAVE BEEN INSTRUCTED TO HOLD BY YOUR DOCTOR FOR THIS PROCEDURE, then do NOT take this the morning of your EGD.  Do NOT stop these medications on your own, they must be approved to be held by your doctor.  Your EGD can NOT be done if you are on these medications.  Examples of blood thinners include: Coumadin, Aggrenox, Plavix, Pradaxa, Reapro, Pletal, Xarelto, Ticagrelor, Brilinta, Eliquis, and high dose aspirin (325 mg).  You do not have to stop baby aspirin 81 mg.    You will receive a call 2-3 days before your EGD to tell you the time to arrive.  If you have not received a call by the day before your procedure, call the Endoscopy department at [phone].

## 2020-03-11 NOTE — PROGRESS NOTES
Subjective:       Patient ID: Dev Sweeney is a 44 y.o. male.    Chief Complaint: Abdominal Pain; Emesis; Nausea; and Hiatal Hernia    This is a 44-year-old male here for evaluation after a recent emergency room visit.  He has a long history of reflux symptoms and underwent hiatal hernia repair over 20 years ago.  States that his symptoms were overall fairly controlled but he has been back on PPI therapy over the last number of years.  Recently he noted worsening chest discomfort associated with regurgitation of undigested food which can be hours afterwards.  He states the discomfort is a sharp sensation associated with significant belching and burping.  This is despite being on PPI therapy.  He presented to the emergency room an outside facility and reports being ruled out for cardiopulmonary abnormalities.  Was placed on Reglan 10 mg in addition to dicyclomine and has been feeling slightly improved.  Overall symptoms are moderate severity, occurring daily without any radiation. No orthopnea or PND. +tob use; CT notes recurrent hernia    The following portions of the patient's history were reviewed and updated as appropriate: allergies, current medications, past family history, past medical history, past social history, past surgical history and problem list.    (Portions of this note were dictated using voice recognition software and may contain dictation related errors in spelling/grammar/syntax not found on text review)  HPI  Review of Systems   Constitutional: Negative for chills and fever.   HENT: Negative for postnasal drip and trouble swallowing.    Eyes: Negative for pain and visual disturbance.   Respiratory: Negative for cough, choking and shortness of breath.    Cardiovascular: Positive for chest pain. Negative for leg swelling.   Gastrointestinal: Positive for vomiting. Negative for abdominal distention, abdominal pain, anal bleeding, blood in stool, constipation, diarrhea, nausea and rectal  pain.   Endocrine: Negative for cold intolerance and heat intolerance.   Genitourinary: Negative for difficulty urinating and hematuria.   Neurological: Negative for dizziness and numbness.   Hematological: Negative for adenopathy. Does not bruise/bleed easily.   Psychiatric/Behavioral: Negative for agitation and confusion.         Objective:      Physical Exam   Constitutional: He is oriented to person, place, and time. He appears well-developed and well-nourished. No distress.   HENT:   Head: Normocephalic and atraumatic.   Eyes: Conjunctivae are normal. No scleral icterus.   Neck: No tracheal deviation present. No thyromegaly present.   Cardiovascular: Normal rate. Exam reveals no gallop and no friction rub.   Pulmonary/Chest: Effort normal. No respiratory distress.   Abdominal: Soft. Bowel sounds are normal. He exhibits no distension. There is no tenderness. There is no rebound and no guarding.   Musculoskeletal: Normal range of motion. He exhibits no edema or tenderness.   Neurological: He is alert and oriented to person, place, and time.   Skin: He is not diaphoretic.   Psychiatric: He has a normal mood and affect. His behavior is normal.   Nursing note and vitals reviewed.        Labs/imaging; reviewed  Assessment:       1. Hernia, hiatal    2. Burping        Plan:   1. Continue meds, doing better  2. Will d/w surgery re: need for bravo in event surgical re-do is indicated  3. EGD

## 2020-03-11 NOTE — LETTER
March 11, 2020      Loki Alba, DO  [address]  [place] [address]           [facility]  [address]  [facility] [address]  Phone: [phone]          Patient: Dev Sweeney   MR Number: [id number]   YOB: 1975   Date of Visit: 3/11/2020       Dear Dr. Loki Alba:    Thank you for referring Dev Sweeney to me for evaluation. Attached you will find relevant portions of my assessment and plan of care.    If you have questions, please do not hesitate to call me. I look forward to following Dev Sweeney along with you.    Sincerely,    Saniya Shrestha MD    Enclosure  CC:  No Recipients    If you would like to receive this communication electronically, please contact [email or web address] or [phone] to request more information on [employer] access.    For providers and/or their staff who would like to refer a patient to [facility], please contact us through our one-stop-shop provider referral line, [facility], at [phone].    If you feel you have received this communication in error or would no longer like to receive these types of communications, please e-mail [email or web address]

## 2020-03-28 ENCOUNTER — PATIENT MESSAGE (OUTPATIENT)
Dept: INTERNAL MEDICINE | Facility: CLINIC | Age: 45
End: 2020-03-28

## 2020-04-01 ENCOUNTER — TELEPHONE (OUTPATIENT)
Dept: GASTROENTEROLOGY | Facility: CLINIC | Age: 45
End: 2020-04-01

## 2020-04-01 NOTE — TELEPHONE ENCOUNTER
----- Message from Mariaa Gambino RN sent at 4/1/2020  8:33 AM CDT -----  Regarding: reschedule  Left message to reschedule procedure due to Covid Crisis.

## 2020-04-06 RX ORDER — OMEPRAZOLE 40 MG/1
CAPSULE, DELAYED RELEASE ORAL
Qty: 90 CAPSULE | Refills: 3 | Status: SHIPPED | OUTPATIENT
Start: 2020-04-06 | End: 2021-03-22 | Stop reason: SDUPTHER

## 2020-04-14 RX ORDER — TRAZODONE HYDROCHLORIDE 100 MG/1
100 TABLET ORAL NIGHTLY PRN
Qty: 90 TABLET | Refills: 1 | Status: SHIPPED | OUTPATIENT
Start: 2020-04-14 | End: 2021-10-13

## 2020-05-04 ENCOUNTER — TELEPHONE (OUTPATIENT)
Dept: GASTROENTEROLOGY | Facility: CLINIC | Age: 45
End: 2020-05-04

## 2020-05-04 DIAGNOSIS — R11.10 VOMITING, INTRACTABILITY OF VOMITING NOT SPECIFIED, PRESENCE OF NAUSEA NOT SPECIFIED, UNSPECIFIED VOMITING TYPE: Primary | ICD-10-CM

## 2020-05-27 ENCOUNTER — TELEPHONE (OUTPATIENT)
Dept: GASTROENTEROLOGY | Facility: CLINIC | Age: 45
End: 2020-05-27

## 2020-07-15 ENCOUNTER — OFFICE VISIT (OUTPATIENT)
Dept: URGENT CARE | Facility: CLINIC | Age: 45
End: 2020-07-15
Payer: OTHER GOVERNMENT

## 2020-07-15 VITALS
HEART RATE: 117 BPM | OXYGEN SATURATION: 97 % | DIASTOLIC BLOOD PRESSURE: 108 MMHG | SYSTOLIC BLOOD PRESSURE: 153 MMHG | WEIGHT: 220 LBS | TEMPERATURE: 99 F | HEIGHT: 71 IN | BODY MASS INDEX: 30.8 KG/M2

## 2020-07-15 DIAGNOSIS — R06.02 SHORTNESS OF BREATH: ICD-10-CM

## 2020-07-15 DIAGNOSIS — J98.01 ACUTE BRONCHOSPASM: Primary | ICD-10-CM

## 2020-07-15 DIAGNOSIS — R05.9 COUGH: ICD-10-CM

## 2020-07-15 PROCEDURE — 99203 PR OFFICE/OUTPT VISIT, NEW, LEVL III, 30-44 MIN: ICD-10-PCS | Mod: TIER,25,S$GLB, | Performed by: INTERNAL MEDICINE

## 2020-07-15 PROCEDURE — 71046 XR CHEST PA AND LATERAL: ICD-10-PCS | Mod: FY,TIER,S$GLB, | Performed by: RADIOLOGY

## 2020-07-15 PROCEDURE — 71046 X-RAY EXAM CHEST 2 VIEWS: CPT | Mod: FY,TIER,S$GLB, | Performed by: RADIOLOGY

## 2020-07-15 PROCEDURE — 96372 THER/PROPH/DIAG INJ SC/IM: CPT | Mod: TIER,S$GLB,, | Performed by: INTERNAL MEDICINE

## 2020-07-15 PROCEDURE — U0003 INFECTIOUS AGENT DETECTION BY NUCLEIC ACID (DNA OR RNA); SEVERE ACUTE RESPIRATORY SYNDROME CORONAVIRUS 2 (SARS-COV-2) (CORONAVIRUS DISEASE [COVID-19]), AMPLIFIED PROBE TECHNIQUE, MAKING USE OF HIGH THROUGHPUT TECHNOLOGIES AS DESCRIBED BY CMS-2020-01-R: HCPCS

## 2020-07-15 PROCEDURE — 99203 OFFICE O/P NEW LOW 30 MIN: CPT | Mod: TIER,25,S$GLB, | Performed by: INTERNAL MEDICINE

## 2020-07-15 PROCEDURE — 96372 PR INJECTION,THERAP/PROPH/DIAG2ST, IM OR SUBCUT: ICD-10-PCS | Mod: TIER,S$GLB,, | Performed by: INTERNAL MEDICINE

## 2020-07-15 RX ORDER — BETAMETHASONE SODIUM PHOSPHATE AND BETAMETHASONE ACETATE 3; 3 MG/ML; MG/ML
9 INJECTION, SUSPENSION INTRA-ARTICULAR; INTRALESIONAL; INTRAMUSCULAR; SOFT TISSUE ONCE
Status: COMPLETED | OUTPATIENT
Start: 2020-07-15 | End: 2020-07-15

## 2020-07-15 RX ORDER — METHYLPREDNISOLONE 4 MG/1
TABLET ORAL
Qty: 1 PACKAGE | Refills: 0 | Status: SHIPPED | OUTPATIENT
Start: 2020-07-16 | End: 2020-08-15

## 2020-07-15 RX ADMIN — BETAMETHASONE SODIUM PHOSPHATE AND BETAMETHASONE ACETATE 9 MG: 3; 3 INJECTION, SUSPENSION INTRA-ARTICULAR; INTRALESIONAL; INTRAMUSCULAR; SOFT TISSUE at 10:07

## 2020-07-15 NOTE — LETTER
July 15, 2020      [facility]  [address]  [place] [address]  Phone: [phone]  Fax: [phone]       Patient: Dev Sweeney   YOB: 1975  Date of Visit: 07/15/2020    To Whom It May Concern:    Katt Sweeney  was at [facility] on 07/15/2020. He may return to work/school on 7/20/20 with no restrictions. If you have any questions or concerns, or if I can be of further assistance, please do not hesitate to contact me.    Sincerely,    Dave Blood MD

## 2020-07-15 NOTE — PROGRESS NOTES
"Subjective:       Patient ID: Dev Sweeney is a 45 y.o. male.    Vitals:  height is 5' 11" (1.803 m) and weight is 99.8 kg (220 lb). His temperature is 98.7 °F (37.1 °C). His blood pressure is 153/108 (abnormal) and his pulse is 117 (abnormal). His oxygen saturation is 97%.     Chief Complaint: Cough    Cough  This is a recurrent problem. The current episode started more than 1 month ago (one month on and off). The problem has been waxing and waning. The problem occurs hourly. The cough is productive of brown sputum and productive of sputum. Associated symptoms include chest pain and shortness of breath. Pertinent negatives include no chills, ear congestion, ear pain, eye redness, fever, headaches, heartburn, hemoptysis, myalgias, nasal congestion, postnasal drip, rash, rhinorrhea, sore throat, sweats, weight loss or wheezing. Associated symptoms comments: Tightness in chest. The symptoms are aggravated by lying down (mornings). Risk factors for lung disease include travel (traveled to [place], 29th. Airplane travel.). He has tried OTC cough suppressant (Predisone) for the symptoms. The treatment provided mild relief. His past medical history is significant for asthma and bronchitis. There is no history of bronchiectasis, COPD, emphysema, environmental allergies or pneumonia.       Constitution: Negative for chills, sweating, fatigue and fever.   HENT: Negative for ear pain, congestion, postnasal drip, sinus pain, sinus pressure, sore throat and voice change.    Neck: Negative for painful lymph nodes.   Cardiovascular: Positive for chest pain.   Eyes: Negative for eye redness.   Respiratory: Positive for shortness of breath. Negative for chest tightness, cough, sputum production, bloody sputum, COPD, stridor, wheezing and asthma.    Gastrointestinal: Negative for nausea, vomiting and heartburn.   Musculoskeletal: Negative for muscle ache.   Skin: Negative for rash.   Allergic/Immunologic: Negative for " environmental allergies, seasonal allergies and asthma.   Neurological: Negative for headaches.   Hematologic/Lymphatic: Negative for swollen lymph nodes.       Objective:      Physical Exam   HENT:   Head: Normocephalic and atraumatic.   Ears:   Right Ear: Tympanic membrane, external ear and ear canal normal.   Left Ear: Tympanic membrane, external ear and ear canal normal.   Nose: Nose normal.   Mouth/Throat: Mucous membranes are moist. Oropharynx is clear.   Eyes: Pupils are equal, round, and reactive to light. Conjunctivae are normal.   Neck: Normal range of motion. Neck supple.   Cardiovascular: Normal rate, regular rhythm, normal heart sounds and normal pulses.   Pulmonary/Chest: Effort normal and breath sounds normal.   Abdominal: Normal appearance.   Neurological: He is alert.   Nursing note and vitals reviewed.        Assessment:       1. Acute bronchospasm    2. Cough        Plan:         Acute bronchospasm  -     COVID-19 Routine Screening  -     XR CHEST PA AND LATERAL; Future; Expected date: 07/15/2020    Cough  -     COVID-19 Routine Screening  -     XR CHEST PA AND LATERAL; Future; Expected date: 07/15/2020

## 2020-07-17 LAB — SARS-COV-2 RNA RESP QL NAA+PROBE: NOT DETECTED

## 2020-07-19 ENCOUNTER — TELEPHONE (OUTPATIENT)
Dept: URGENT CARE | Facility: CLINIC | Age: 45
End: 2020-07-19

## 2020-07-19 NOTE — TELEPHONE ENCOUNTER
Patient requested for paperwork to be filled out for his job after tested negative for COVID-19.  Patient okay to return to work, no symptoms for 72 hr.  Patient negative for COVID-19.Patient voiced understanding and in agreement with current treatment plan.

## 2020-08-15 ENCOUNTER — OFFICE VISIT (OUTPATIENT)
Dept: URGENT CARE | Facility: CLINIC | Age: 45
End: 2020-08-15

## 2020-08-15 VITALS
OXYGEN SATURATION: 96 % | HEART RATE: 115 BPM | WEIGHT: 235 LBS | DIASTOLIC BLOOD PRESSURE: 106 MMHG | BODY MASS INDEX: 32.9 KG/M2 | HEIGHT: 71 IN | SYSTOLIC BLOOD PRESSURE: 155 MMHG | TEMPERATURE: 98 F

## 2020-08-15 DIAGNOSIS — J45.909 ASTHMA, UNSPECIFIED ASTHMA SEVERITY, UNSPECIFIED WHETHER COMPLICATED, UNSPECIFIED WHETHER PERSISTENT: Primary | ICD-10-CM

## 2020-08-15 DIAGNOSIS — R06.02 SHORTNESS OF BREATH: ICD-10-CM

## 2020-08-15 DIAGNOSIS — R03.0 ELEVATED BLOOD PRESSURE READING: ICD-10-CM

## 2020-08-15 DIAGNOSIS — J20.9 BRONCHITIS WITH ASTHMA, ACUTE: ICD-10-CM

## 2020-08-15 DIAGNOSIS — J45.909 BRONCHITIS WITH ASTHMA, ACUTE: ICD-10-CM

## 2020-08-15 PROCEDURE — 71046 XR CHEST PA AND LATERAL: ICD-10-PCS | Mod: FY,S$GLB,, | Performed by: RADIOLOGY

## 2020-08-15 PROCEDURE — 99214 PR OFFICE/OUTPT VISIT, EST, LEVL IV, 30-39 MIN: ICD-10-PCS | Mod: S$GLB,,, | Performed by: NURSE PRACTITIONER

## 2020-08-15 PROCEDURE — 71046 X-RAY EXAM CHEST 2 VIEWS: CPT | Mod: FY,S$GLB,, | Performed by: RADIOLOGY

## 2020-08-15 PROCEDURE — 99214 OFFICE O/P EST MOD 30 MIN: CPT | Mod: S$GLB,,, | Performed by: NURSE PRACTITIONER

## 2020-08-15 RX ORDER — AZITHROMYCIN 250 MG/1
TABLET, FILM COATED ORAL
Qty: 6 TABLET | Refills: 0 | Status: ON HOLD | OUTPATIENT
Start: 2020-08-15 | End: 2020-08-27 | Stop reason: HOSPADM

## 2020-08-15 RX ORDER — ALBUTEROL SULFATE 0.83 MG/ML
2.5 SOLUTION RESPIRATORY (INHALATION) EVERY 6 HOURS PRN
Qty: 1 BOX | Refills: 0 | Status: SHIPPED | OUTPATIENT
Start: 2020-08-15 | End: 2020-09-14

## 2020-08-15 RX ORDER — PREDNISONE 20 MG/1
20 TABLET ORAL DAILY
Qty: 5 TABLET | Refills: 0 | Status: SHIPPED | OUTPATIENT
Start: 2020-08-15 | End: 2020-08-20

## 2020-08-15 NOTE — PATIENT INSTRUCTIONS
"Please be aware your blood pressure was slightly elevated today -  Make sure to take your blood pressure medicines, eat a low salt diet and recheck your blood pressure to make sure it is not getting too elevated ( greater than 160/100).   Advised pt to take bp again when well - if still elevated f/u with pcp.         Asthma Discharge   If your condition worsens or fails to improve we recommend that you receive another evaluation at the ER immediately or contact your PCP to discuss your concerns or return here. You must understand that you've received an urgent care treatment only and that you may be released before all your medical problems are known or treated. You the patient will arrange for follw care as instructed.   Keep the scheduled appointment with your specialist as discussed.    You received a steroid shot on today   Rest and fluids are important  Take inhaler as prescribed and needed for wheezing  -  Flonase (fluticasone) is a nasal spray which is available over the counter and may help with your symptoms.   -  If you have hypertension avoid using the "D" which is the decongestant.  Instead you can use Coricidin HBP for cold and cough symptoms.    -  If you just have drainage you can take plain Zyrtec, Claritin or Allegra   -  If you just have a congested feeling you can take pseudoephedrine (unless you have high blood pressure) which you have to sign for behind the counter. Do not buy the phenylephrine which is on the shelf as it is not effective   -  Rest and fluids are also important.   -  Tylenol or ibuprofen can also be used as directed for pain unless you have an allergy to them or medical condition such as stomach ulcers, kidney or liver disease or blood thinners etc for which you should not be taking these type of medications.   Please follow up with your primary care doctor or specialist in the next 48-72hrs as needed and if no improvement  If you  smoke, please stop smoking.  Controlling " Your Asthma  You can do a lot to manage your asthma and improve your quality of life. You will need to work with your healthcare provider to develop a plan. But its up to you to put this plan into action.  Why you need to take control  You need to control the inflammation in your lungs. Take all medicine as directed, especially controller medicines, even if you feel that your asthma is under good control. You also need to relieve symptoms when you have them. These are long-term tasks. But the more you stay in control, the better youll feel. If you dont stay in control:  · Asthma symptoms may cause you to miss school, work, or activities that you enjoy.  · Asthma flare-ups can be dangerous, even deadly.  · Uncontrolled asthma makes it more likely that you will need emergency department and in-hospital care.  · Uncontrolled asthma may cause permanent damage to your lungs.    Peak flow monitoring helps measure how open your airways are.   Taking medicine helps you control your asthma and relieve symptoms when they occur.     Using an Asthma Action Plan will help you keep track of and respond to asthma symptoms.   Avoiding triggers--the things that inflame your airways--will help prevent symptoms and flare-ups.   Your action plan  Your healthcare provider will help you prepare, and when needed, update your personal Asthma Action Plan. Your plan tells you what to do based on your current symptoms. If you don't have an Asthma Action Plan, or if yours isn't up-to-date, make sure you talk with your healthcare provider.  Date Last Reviewed: 1/1/2017  © 8038-2246 [employer] [address] [place] [address], [place] [address]. All rights reserved. This information is not intended as a substitute for professional medical care. Always follow your healthcare professional's instructions.

## 2020-08-15 NOTE — PROGRESS NOTES
"Subjective:       Patient ID: Dev Sweeney is a 45 y.o. male.    Vitals:  height is 5' 11" (1.803 m) and weight is 106.6 kg (235 lb). His temperature is 98.2 °F (36.8 °C). His blood pressure is 155/106 (abnormal) and his pulse is 115 (abnormal). His oxygen saturation is 96%.     Chief Complaint: Shortness of Breath    This is a 45 y.o. male who presents today with a chief complaint of SOB for one month, patient was seen 1 month ago COVID test was negative, patient was given prednisone and reports he started to get better but symptoms never resolved completely, patient reports he is taking Mucinex also, patient reports wheezing, patient reports history of asthma using his inhaler for wheezing which resolves after the inhaler intake but he feels like inhaler is not helping completely, patient reports he feels like when lays down that "something hugging on his chest", denies chest pain, patient is a former smoker and reports he quit smoking 3 weeks ago, patient reports he used the inhaler 0.5 hr ago  Patient currently denies fever, chills, body aches, CP, SOB, wheezing, abdominal pain, nausea, vomiting, diarrhea, constipation, headache, blurry vision, dizziness or syncope        Shortness of Breath  This is a recurrent problem. The current episode started 1 to 4 weeks ago (3 weeks ago). The problem has been waxing and waning. Associated symptoms include wheezing. Pertinent negatives include no abdominal pain, chest pain, claudication, coryza, ear pain, fever, headaches, hemoptysis, leg pain, leg swelling, neck pain, orthopnea, PND, rash, rhinorrhea, sore throat, sputum production, swollen glands, syncope or vomiting. Exacerbated by: lying down. Associated symptoms comments: "feels like someone is hugging on his chest". The patient has no known risk factors for DVT/PE. His past medical history is significant for asthma. There is no history of allergies, aspirin allergies, bronchiolitis, CAD, chronic lung disease, " COPD, DVT, a heart failure, PE, pneumonia or a recent surgery.       Constitution: Negative for chills, sweating, fatigue and fever.   HENT: Negative for ear pain, congestion, sinus pain, sinus pressure, sore throat and voice change.    Neck: Negative for neck pain and painful lymph nodes.   Cardiovascular: Negative for chest pain, leg swelling and passing out.   Eyes: Negative for eye redness.   Respiratory: Positive for shortness of breath and wheezing. Negative for chest tightness, cough, sputum production, bloody sputum, COPD, stridor and asthma.    Gastrointestinal: Negative for abdominal pain, nausea and vomiting.   Musculoskeletal: Negative for muscle ache.   Skin: Negative for rash.   Allergic/Immunologic: Negative for seasonal allergies and asthma.   Neurological: Negative for headaches.   Hematologic/Lymphatic: Negative for swollen lymph nodes.       Objective:      Physical Exam   Constitutional: He is oriented to person, place, and time. He appears well-developed. He is cooperative.  Non-toxic appearance. He does not appear ill. No distress.      Comments:Patient sitting comfortably on the exam table, non toxic appearance  and answering questions appropriately, no acute distress     HENT:   Head: Normocephalic and atraumatic.   Ears:   Right Ear: Hearing, tympanic membrane, external ear and ear canal normal.   Left Ear: Hearing, tympanic membrane, external ear and ear canal normal.   Nose: Nose normal. No mucosal edema, rhinorrhea or nasal deformity. No epistaxis. Right sinus exhibits no maxillary sinus tenderness and no frontal sinus tenderness. Left sinus exhibits no maxillary sinus tenderness and no frontal sinus tenderness.   Mouth/Throat: Uvula is midline, oropharynx is clear and moist and mucous membranes are normal. No trismus in the jaw. Normal dentition. No uvula swelling. No oropharyngeal exudate, posterior oropharyngeal edema or posterior oropharyngeal erythema.   Eyes: Conjunctivae and lids  are normal. No scleral icterus.   Neck: Trachea normal, full passive range of motion without pain and phonation normal. Neck supple. No neck rigidity. No edema and no erythema present.   Cardiovascular: Normal rate, regular rhythm, normal heart sounds and normal pulses.   Pulmonary/Chest: Effort normal and breath sounds normal. No respiratory distress. He has no decreased breath sounds. He has no wheezes. He has no rhonchi. He has no rales.   No wheezing throughout all lung fields, patient used inhaler half hour ago    Comments: No wheezing throughout all lung fields, patient used inhaler half hour ago    Abdominal: Normal appearance.   Musculoskeletal: Normal range of motion.         General: No deformity.   Neurological: He is alert and oriented to person, place, and time. He exhibits normal muscle tone. Coordination normal.   Skin: Skin is warm, dry, intact, not diaphoretic and not pale. Psychiatric: His speech is normal and behavior is normal. Judgment and thought content normal. His mood appears anxious.   Nursing note and vitals reviewed.      X-ray Chest Pa And Lateral    Result Date: 8/15/2020  EXAMINATION: XR CHEST PA AND LATERAL CLINICAL HISTORY: Shortness of breath TECHNIQUE: PA and lateral views of the chest were performed. COMPARISON: Chest radiograph performed 07/15/2020 FINDINGS: Cardiomediastinal silhouette is unchanged.  Ill-defined reticular opacities are again noted in both lungs.  There is prominence of the central pulmonary vasculature.  Some peribronchial thickening is identified.  No definite pleural effusion or pneumothorax is identified.  No definite new focal consolidation relative to the 07/15/2020 study.  Osseous structures, soft tissues, and upper abdomen without acute change.     1. No acute change relative to 07/15/2020 radiographs. 2. Bilateral indistinct linear opacities and peribronchial thickening would be compatible with reactive airway disease or viral process. Electronically  signed by: Marcus Gonzalez Date:    08/15/2020 Time:    09:50      45-year-old male with history of asthma and recent smoking cessation presents with shortness of breath and wheezing for past 1 month.  Denies chest pain.  Patient in no acute distress.  Vitals reassuring.  Chest x-ray positive for peribronchial thickening with possible reactive airway disease or viral process.  Strongly advised patient to follow up with pulmonology for management of asthma.  Patient BP high in clinic patient denies history of hypertension, advised patient to follow-up with primary and monitor his BP closely.  Discussed results/diagnosis/plan with patient in clinic. Patient verbally understood and agreed with treatment plan.  Strict precautions given to patient to monitor for worsening signs and symptoms. Advised to follow up with primary.All questions answered. Strict ER precautions given. If your symptoms worsens of fail to improve you should go to the Emergency Room. Patient voiced understanding and in agreement with current treatment plan.        Assessment:       1. Asthma, unspecified asthma severity, unspecified whether complicated, unspecified whether persistent    2. Shortness of breath    3. Bronchitis with asthma, acute    4. Elevated blood pressure reading        Plan:         Asthma, unspecified asthma severity, unspecified whether complicated, unspecified whether persistent  -     albuterol (PROVENTIL) 2.5 mg /3 mL (0.083 %) nebulizer solution; Take 3 mLs (2.5 mg total) by nebulization every 6 (six) hours as needed for Wheezing. Rescue  Dispense: 1 Box; Refill: 0  -     predniSONE (DELTASONE) 20 MG tablet; Take 1 tablet (20 mg total) by mouth once daily. for 5 days  Dispense: 5 tablet; Refill: 0  -     Ambulatory referral/consult to Pulmonology    Shortness of breath  -     X-Ray Chest PA And Lateral; Future; Expected date: 08/15/2020  -     Ambulatory referral/consult to Pulmonology    Bronchitis with asthma,  "acute    Elevated blood pressure reading    Other orders  -     azithromycin (Z-CHET) 250 MG tablet; Take 2 tablets by mouth x 1 for day 1 Then take 1 tablet by mouth daily for day 2 - 5  Dispense: 6 tablet; Refill: 0      Patient Instructions     Please be aware your blood pressure was slightly elevated today -  Make sure to take your blood pressure medicines, eat a low salt diet and recheck your blood pressure to make sure it is not getting too elevated ( greater than 160/100).   Advised pt to take bp again when well - if still elevated f/u with pcp.         Asthma Discharge   If your condition worsens or fails to improve we recommend that you receive another evaluation at the ER immediately or contact your PCP to discuss your concerns or return here. You must understand that you've received an urgent care treatment only and that you may be released before all your medical problems are known or treated. You the patient will arrange for follouwp care as instructed.   Keep the scheduled appointment with your specialist as discussed.    You received a steroid shot on today   Rest and fluids are important  Take inhaler as prescribed and needed for wheezing  -  Flonase (fluticasone) is a nasal spray which is available over the counter and may help with your symptoms.   -  If you have hypertension avoid using the "D" which is the decongestant.  Instead you can use Coricidin HBP for cold and cough symptoms.    -  If you just have drainage you can take plain Zyrtec, Claritin or Allegra   -  If you just have a congested feeling you can take pseudoephedrine (unless you have high blood pressure) which you have to sign for behind the counter. Do not buy the phenylephrine which is on the shelf as it is not effective   -  Rest and fluids are also important.   -  Tylenol or ibuprofen can also be used as directed for pain unless you have an allergy to them or medical condition such as stomach ulcers, kidney or liver disease or " blood thinners etc for which you should not be taking these type of medications.   Please follow up with your primary care doctor or specialist in the next 48-72hrs as needed and if no improvement  If you  smoke, please stop smoking.  Controlling Your Asthma  You can do a lot to manage your asthma and improve your quality of life. You will need to work with your healthcare provider to develop a plan. But its up to you to put this plan into action.  Why you need to take control  You need to control the inflammation in your lungs. Take all medicine as directed, especially controller medicines, even if you feel that your asthma is under good control. You also need to relieve symptoms when you have them. These are long-term tasks. But the more you stay in control, the better youll feel. If you dont stay in control:  · Asthma symptoms may cause you to miss school, work, or activities that you enjoy.  · Asthma flare-ups can be dangerous, even deadly.  · Uncontrolled asthma makes it more likely that you will need emergency department and in-hospital care.  · Uncontrolled asthma may cause permanent damage to your lungs.    Peak flow monitoring helps measure how open your airways are.   Taking medicine helps you control your asthma and relieve symptoms when they occur.     Using an Asthma Action Plan will help you keep track of and respond to asthma symptoms.   Avoiding triggers--the things that inflame your airways--will help prevent symptoms and flare-ups.   Your action plan  Your healthcare provider will help you prepare, and when needed, update your personal Asthma Action Plan. Your plan tells you what to do based on your current symptoms. If you don't have an Asthma Action Plan, or if yours isn't up-to-date, make sure you talk with your healthcare provider.  Date Last Reviewed: 1/1/2017  © 5166-3790 [employer]. [address] [place] [address], [place] [address]. All rights reserved. This information is not  intended as a substitute for professional medical care. Always follow your healthcare professional's instructions.

## 2020-08-18 ENCOUNTER — TELEPHONE (OUTPATIENT)
Dept: PULMONOLOGY | Facility: CLINIC | Age: 45
End: 2020-08-18

## 2020-08-18 NOTE — TELEPHONE ENCOUNTER
Spoke with patient, informed him that I'm contacting I'm in regards to scheduling his appointment with one of our providers. Patient states that he is feeling much better and that he does not think that he needs to schedule appointment. I verbalized to patient that I understand and advised patient that if he wants to schedule appointment or if he has nay questions or concerns, he may contact the office. Office number has been provided.

## 2020-08-19 ENCOUNTER — PATIENT MESSAGE (OUTPATIENT)
Dept: INTERNAL MEDICINE | Facility: CLINIC | Age: 45
End: 2020-08-19

## 2020-08-19 NOTE — TELEPHONE ENCOUNTER
Pt went to UC on 7-15-20   He was given a steroid inj  covid negative  Pt went back 2 weeks later bc still not feeling good.  Another steroid inj/ zpak / breathing tx.  Pt woke up this morning swollen from waist down... gained 12 lbs in 5 days    Pt was getting winded just talking to me.  Please advise

## 2020-08-21 ENCOUNTER — TELEPHONE (OUTPATIENT)
Dept: PULMONOLOGY | Facility: CLINIC | Age: 45
End: 2020-08-21

## 2020-08-21 ENCOUNTER — LAB VISIT (OUTPATIENT)
Dept: URGENT CARE | Facility: CLINIC | Age: 45
End: 2020-08-21
Payer: OTHER GOVERNMENT

## 2020-08-21 DIAGNOSIS — R06.02 SOB (SHORTNESS OF BREATH): ICD-10-CM

## 2020-08-21 DIAGNOSIS — R06.02 SOB (SHORTNESS OF BREATH): Primary | ICD-10-CM

## 2020-08-21 PROCEDURE — U0003 INFECTIOUS AGENT DETECTION BY NUCLEIC ACID (DNA OR RNA); SEVERE ACUTE RESPIRATORY SYNDROME CORONAVIRUS 2 (SARS-COV-2) (CORONAVIRUS DISEASE [COVID-19]), AMPLIFIED PROBE TECHNIQUE, MAKING USE OF HIGH THROUGHPUT TECHNOLOGIES AS DESCRIBED BY CMS-2020-01-R: HCPCS

## 2020-08-21 NOTE — TELEPHONE ENCOUNTER
----- Message from Brenda Leo sent at 8/21/2020 10:52 AM CDT -----  Pt is returning a missed call regarding scheduling an appt for Monday. Please contact the pt to advise.    Contact info [phone]

## 2020-08-21 NOTE — TELEPHONE ENCOUNTER
----- Message from Malika Nagel sent at 8/21/2020  8:24 AM CDT -----  Regarding: Pulmonology Appointment  Contact: patient  Type:  Same Day Appointment Request    Caller is requesting a same day appointment.  Caller declined first available appointment in 9/2020  Name of Caller: Dev  When is the first available appointment: 9/2020  Symptoms:SOB, Fluid In Legs & ankles, trouble sleeping, trouble breathing  Best Call Back Number:[phone]  Additional Information: Pt had an order in the system from Lynn Holley NP to see Pulmonology for Shortness of breath, Asthma, unspecified asthma severity, unspecified whether complicated. Order created on 8/15/2020; order has been cancelled. Pt called today requesting a same-day appointment with a provider.

## 2020-08-21 NOTE — TELEPHONE ENCOUNTER
----- Message from Ester Gisselle sent at 8/21/2020 10:17 AM CDT -----  Contact: [phone]    eDv  Devon says he called earlier and wants to be seen TODAY.     Says he cannot wait  until next week.   Says he went to Urgent Care and was given Prednisone and it has his feet and ankles swollen.    Says he is finished the Prednisone and needs to be seen today.     Also says he gained 10 lbs. In 2 days.    Has no insurance, but will pay out of pocket.   Having wheezing, shortness of breath, tightness in chest, not able to rest or sleep.

## 2020-08-21 NOTE — TELEPHONE ENCOUNTER
Left patient a message on his voicemail, informing him that I have received his message. I advised patient that we do not have any available appointments on today but I can schedule patient appointment for one day next week. I also advised patient that if he has any questions or concerns, he may contact the office. Office number has been provided.

## 2020-08-21 NOTE — TELEPHONE ENCOUNTER
Spoke with patient  in regards to same day appointment. I informed patient [facility] doesn't have any appointments available for today but I can be sure to schedule you for Monday with our NP.  Patient states he can't wait until Monday to be seen I informed patient to visit his nearest ER.  Patient verbalized he understands. Patient was also provided with my name and also office number if needed to make an appointment.

## 2020-08-21 NOTE — TELEPHONE ENCOUNTER
Spoke with patient, informed him that I have received his message. I advised patient that I can schedule his appointment with SANJANA Isaac on 8/24/20 for 3:00 pm. Patient verbalized that he understand and excepted the appointment.

## 2020-08-22 LAB — SARS-COV-2 RNA RESP QL NAA+PROBE: NOT DETECTED

## 2020-08-24 ENCOUNTER — HOSPITAL ENCOUNTER (OUTPATIENT)
Dept: PULMONOLOGY | Facility: CLINIC | Age: 45
Discharge: HOME | End: 2020-08-24
Payer: OTHER GOVERNMENT

## 2020-08-24 ENCOUNTER — HOSPITAL ENCOUNTER (OUTPATIENT)
Facility: HOSPITAL | Age: 45
Discharge: HOME | End: 2020-08-27
Attending: EMERGENCY MEDICINE | Admitting: INTERNAL MEDICINE

## 2020-08-24 ENCOUNTER — OFFICE VISIT (OUTPATIENT)
Dept: PULMONOLOGY | Facility: CLINIC | Age: 45
End: 2020-08-24
Payer: OTHER GOVERNMENT

## 2020-08-24 VITALS
SYSTOLIC BLOOD PRESSURE: 126 MMHG | OXYGEN SATURATION: 98 % | HEIGHT: 71 IN | HEART RATE: 115 BPM | WEIGHT: 254 LBS | DIASTOLIC BLOOD PRESSURE: 90 MMHG | BODY MASS INDEX: 35.56 KG/M2

## 2020-08-24 DIAGNOSIS — R06.02 SOB (SHORTNESS OF BREATH): ICD-10-CM

## 2020-08-24 DIAGNOSIS — R07.9 CHEST PAIN: ICD-10-CM

## 2020-08-24 DIAGNOSIS — I50.9 CHF (CONGESTIVE HEART FAILURE): ICD-10-CM

## 2020-08-24 DIAGNOSIS — F17.200 TOBACCO DEPENDENCE: ICD-10-CM

## 2020-08-24 DIAGNOSIS — J30.9 ALLERGIC RHINITIS: Primary | ICD-10-CM

## 2020-08-24 DIAGNOSIS — I50.9 ACUTE HEART FAILURE, UNSPECIFIED HEART FAILURE TYPE: Primary | ICD-10-CM

## 2020-08-24 DIAGNOSIS — I50.9 NEW ONSET OF CONGESTIVE HEART FAILURE: ICD-10-CM

## 2020-08-24 DIAGNOSIS — R79.89 ELEVATED TROPONIN: ICD-10-CM

## 2020-08-24 DIAGNOSIS — I50.9 HEART FAILURE: ICD-10-CM

## 2020-08-24 DIAGNOSIS — R07.89 OTHER CHEST PAIN: ICD-10-CM

## 2020-08-24 DIAGNOSIS — R06.02 SHORTNESS OF BREATH: ICD-10-CM

## 2020-08-24 PROBLEM — K76.9 HEPATOPATHY: Status: ACTIVE | Noted: 2020-08-24

## 2020-08-24 LAB
ALBUMIN SERPL BCP-MCNC: 3.5 G/DL (ref 3.5–5.2)
ALP SERPL-CCNC: 58 U/L (ref 55–135)
ALT SERPL W/O P-5'-P-CCNC: 116 U/L (ref 10–44)
ANION GAP SERPL CALC-SCNC: 9 MMOL/L (ref 8–16)
AST SERPL-CCNC: 139 U/L (ref 10–40)
BASOPHILS # BLD AUTO: 0.12 K/UL (ref 0–0.2)
BASOPHILS NFR BLD: 1 % (ref 0–1.9)
BILIRUB SERPL-MCNC: 1.3 MG/DL (ref 0.1–1)
BNP SERPL-MCNC: 822 PG/ML (ref 0–99)
BUN SERPL-MCNC: 8 MG/DL (ref 6–20)
CALCIUM SERPL-MCNC: 9.3 MG/DL (ref 8.7–10.5)
CHLORIDE SERPL-SCNC: 106 MMOL/L (ref 95–110)
CO2 SERPL-SCNC: 24 MMOL/L (ref 23–29)
CREAT SERPL-MCNC: 1.1 MG/DL (ref 0.5–1.4)
DIFFERENTIAL METHOD: ABNORMAL
EOSINOPHIL # BLD AUTO: 0.1 K/UL (ref 0–0.5)
EOSINOPHIL NFR BLD: 0.6 % (ref 0–8)
ERYTHROCYTE [DISTWIDTH] IN BLOOD BY AUTOMATED COUNT: 16.4 % (ref 11.5–14.5)
EST. GFR  (AFRICAN AMERICAN): >60 ML/MIN/1.73 M^2
EST. GFR  (NON AFRICAN AMERICAN): >60 ML/MIN/1.73 M^2
ESTIMATED AVG GLUCOSE: 94 MG/DL (ref 68–131)
GLUCOSE SERPL-MCNC: 104 MG/DL (ref 70–110)
HBA1C MFR BLD HPLC: 4.9 % (ref 4–5.6)
HCT VFR BLD AUTO: 48.3 % (ref 40–54)
HGB BLD-MCNC: 14.7 G/DL (ref 14–18)
IMM GRANULOCYTES # BLD AUTO: 0.08 K/UL (ref 0–0.04)
IMM GRANULOCYTES NFR BLD AUTO: 0.6 % (ref 0–0.5)
INR PPP: 1.1 (ref 0.8–1.2)
LYMPHOCYTES # BLD AUTO: 1.2 K/UL (ref 1–4.8)
LYMPHOCYTES NFR BLD: 9.7 % (ref 18–48)
MAGNESIUM SERPL-MCNC: 1.4 MG/DL (ref 1.6–2.6)
MAGNESIUM SERPL-MCNC: 1.6 MG/DL (ref 1.6–2.6)
MCH RBC QN AUTO: 26.8 PG (ref 27–31)
MCHC RBC AUTO-ENTMCNC: 30.4 G/DL (ref 32–36)
MCV RBC AUTO: 88 FL (ref 82–98)
MONOCYTES # BLD AUTO: 1.1 K/UL (ref 0.3–1)
MONOCYTES NFR BLD: 8.9 % (ref 4–15)
NEUTROPHILS # BLD AUTO: 9.8 K/UL (ref 1.8–7.7)
NEUTROPHILS NFR BLD: 79.2 % (ref 38–73)
NRBC BLD-RTO: 0 /100 WBC
PLATELET # BLD AUTO: 309 K/UL (ref 150–350)
PMV BLD AUTO: 9.9 FL (ref 9.2–12.9)
POTASSIUM SERPL-SCNC: 4.4 MMOL/L (ref 3.5–5.1)
PROT SERPL-MCNC: 6.2 G/DL (ref 6–8.4)
PROTHROMBIN TIME: 12.4 SEC (ref 9–12.5)
RBC # BLD AUTO: 5.48 M/UL (ref 4.6–6.2)
SODIUM SERPL-SCNC: 139 MMOL/L (ref 136–145)
TROPONIN I SERPL DL<=0.01 NG/ML-MCNC: 0.02 NG/ML (ref 0–0.03)
TROPONIN I SERPL DL<=0.01 NG/ML-MCNC: 0.03 NG/ML (ref 0–0.03)
WBC # BLD AUTO: 12.43 K/UL (ref 3.9–12.7)

## 2020-08-24 PROCEDURE — 80053 COMPREHEN METABOLIC PANEL: CPT

## 2020-08-24 PROCEDURE — 94727 GAS DIL/WSHOT DETER LNG VOL: CPT | Mod: 26,S$PBB,, | Performed by: INTERNAL MEDICINE

## 2020-08-24 PROCEDURE — 99285 PR EMERGENCY DEPT VISIT,LEVEL V: ICD-10-PCS | Mod: ,,, | Performed by: PHYSICIAN ASSISTANT

## 2020-08-24 PROCEDURE — 94060 EVALUATION OF WHEEZING: CPT | Mod: PBBFAC | Performed by: INTERNAL MEDICINE

## 2020-08-24 PROCEDURE — 84484 ASSAY OF TROPONIN QUANT: CPT

## 2020-08-24 PROCEDURE — 93010 ELECTROCARDIOGRAM REPORT: CPT | Mod: ,,, | Performed by: INTERNAL MEDICINE

## 2020-08-24 PROCEDURE — G0378 HOSPITAL OBSERVATION PER HR: HCPCS

## 2020-08-24 PROCEDURE — 63600175 PHARM REV CODE 636 W HCPCS: Performed by: PHYSICIAN ASSISTANT

## 2020-08-24 PROCEDURE — 99999 PR PBB SHADOW E&M-EST. PATIENT-LVL III: CPT | Mod: PBBFAC,,, | Performed by: NURSE PRACTITIONER

## 2020-08-24 PROCEDURE — 99220 PR INITIAL OBSERVATION CARE,LEVL III: ICD-10-PCS | Mod: ,,, | Performed by: HOSPITALIST

## 2020-08-24 PROCEDURE — 83036 HEMOGLOBIN GLYCOSYLATED A1C: CPT

## 2020-08-24 PROCEDURE — 94729 PR C02/MEMBANE DIFFUSE CAPACITY: ICD-10-PCS | Mod: 26,S$PBB,, | Performed by: INTERNAL MEDICINE

## 2020-08-24 PROCEDURE — 99220 PR INITIAL OBSERVATION CARE,LEVL III: CPT | Mod: ,,, | Performed by: HOSPITALIST

## 2020-08-24 PROCEDURE — 93005 ELECTROCARDIOGRAM TRACING: CPT

## 2020-08-24 PROCEDURE — 36415 COLL VENOUS BLD VENIPUNCTURE: CPT

## 2020-08-24 PROCEDURE — 99203 PR OFFICE/OUTPT VISIT, NEW, LEVL III, 30-44 MIN: ICD-10-PCS | Mod: S$PBB,,, | Performed by: NURSE PRACTITIONER

## 2020-08-24 PROCEDURE — 93010 EKG 12-LEAD: ICD-10-PCS | Mod: ,,, | Performed by: INTERNAL MEDICINE

## 2020-08-24 PROCEDURE — 85025 COMPLETE CBC W/AUTO DIFF WBC: CPT

## 2020-08-24 PROCEDURE — 94729 DIFFUSING CAPACITY: CPT | Mod: PBBFAC | Performed by: INTERNAL MEDICINE

## 2020-08-24 PROCEDURE — 84484 ASSAY OF TROPONIN QUANT: CPT | Mod: 91

## 2020-08-24 PROCEDURE — 63600175 PHARM REV CODE 636 W HCPCS: Performed by: HOSPITALIST

## 2020-08-24 PROCEDURE — 94060 PR EVAL OF BRONCHOSPASM: ICD-10-PCS | Mod: 26,S$PBB,, | Performed by: INTERNAL MEDICINE

## 2020-08-24 PROCEDURE — 99203 OFFICE O/P NEW LOW 30 MIN: CPT | Mod: S$PBB,,, | Performed by: NURSE PRACTITIONER

## 2020-08-24 PROCEDURE — 85610 PROTHROMBIN TIME: CPT

## 2020-08-24 PROCEDURE — 94060 EVALUATION OF WHEEZING: CPT | Mod: 26,S$PBB,, | Performed by: INTERNAL MEDICINE

## 2020-08-24 PROCEDURE — 94727 GAS DIL/WSHOT DETER LNG VOL: CPT | Mod: PBBFAC | Performed by: INTERNAL MEDICINE

## 2020-08-24 PROCEDURE — 94761 N-INVAS EAR/PLS OXIMETRY MLT: CPT

## 2020-08-24 PROCEDURE — 25500020 PHARM REV CODE 255: Performed by: EMERGENCY MEDICINE

## 2020-08-24 PROCEDURE — 25000003 PHARM REV CODE 250: Performed by: HOSPITALIST

## 2020-08-24 PROCEDURE — 83880 ASSAY OF NATRIURETIC PEPTIDE: CPT

## 2020-08-24 PROCEDURE — 96375 TX/PRO/DX INJ NEW DRUG ADDON: CPT

## 2020-08-24 PROCEDURE — 83735 ASSAY OF MAGNESIUM: CPT | Mod: 91

## 2020-08-24 PROCEDURE — 94729 DIFFUSING CAPACITY: CPT | Mod: 26,S$PBB,, | Performed by: INTERNAL MEDICINE

## 2020-08-24 PROCEDURE — 99285 EMERGENCY DEPT VISIT HI MDM: CPT | Mod: ,,, | Performed by: PHYSICIAN ASSISTANT

## 2020-08-24 PROCEDURE — 96374 THER/PROPH/DIAG INJ IV PUSH: CPT

## 2020-08-24 PROCEDURE — 94727 PR PULM FUNCTION TEST BY GAS: ICD-10-PCS | Mod: 26,S$PBB,, | Performed by: INTERNAL MEDICINE

## 2020-08-24 PROCEDURE — 99213 OFFICE O/P EST LOW 20 MIN: CPT | Mod: PBBFAC,25 | Performed by: NURSE PRACTITIONER

## 2020-08-24 PROCEDURE — 99285 EMERGENCY DEPT VISIT HI MDM: CPT | Mod: 25

## 2020-08-24 PROCEDURE — 99999 PR PBB SHADOW E&M-EST. PATIENT-LVL III: ICD-10-PCS | Mod: PBBFAC,,, | Performed by: NURSE PRACTITIONER

## 2020-08-24 PROCEDURE — 83735 ASSAY OF MAGNESIUM: CPT

## 2020-08-24 RX ORDER — HYDROCODONE BITARTRATE AND ACETAMINOPHEN 5; 325 MG/1; MG/1
1 TABLET ORAL EVERY 4 HOURS PRN
Status: DISCONTINUED | OUTPATIENT
Start: 2020-08-24 | End: 2020-08-27 | Stop reason: HOSPADM

## 2020-08-24 RX ORDER — PANTOPRAZOLE SODIUM 40 MG/1
40 TABLET, DELAYED RELEASE ORAL DAILY
Status: DISCONTINUED | OUTPATIENT
Start: 2020-08-25 | End: 2020-08-27 | Stop reason: HOSPADM

## 2020-08-24 RX ORDER — FUROSEMIDE 10 MG/ML
20 INJECTION INTRAMUSCULAR; INTRAVENOUS 2 TIMES DAILY
Status: DISCONTINUED | OUTPATIENT
Start: 2020-08-25 | End: 2020-08-26

## 2020-08-24 RX ORDER — ACETAMINOPHEN 325 MG/1
650 TABLET ORAL EVERY 4 HOURS PRN
Status: DISCONTINUED | OUTPATIENT
Start: 2020-08-24 | End: 2020-08-27 | Stop reason: HOSPADM

## 2020-08-24 RX ORDER — SODIUM CHLORIDE 0.9 % (FLUSH) 0.9 %
5 SYRINGE (ML) INJECTION
Status: DISCONTINUED | OUTPATIENT
Start: 2020-08-24 | End: 2020-08-27 | Stop reason: HOSPADM

## 2020-08-24 RX ORDER — MAGNESIUM SULFATE HEPTAHYDRATE 40 MG/ML
2 INJECTION, SOLUTION INTRAVENOUS ONCE
Status: COMPLETED | OUTPATIENT
Start: 2020-08-24 | End: 2020-08-25

## 2020-08-24 RX ORDER — ONDANSETRON 2 MG/ML
8 INJECTION INTRAMUSCULAR; INTRAVENOUS EVERY 8 HOURS PRN
Status: DISCONTINUED | OUTPATIENT
Start: 2020-08-24 | End: 2020-08-27 | Stop reason: HOSPADM

## 2020-08-24 RX ORDER — ROSUVASTATIN CALCIUM 10 MG/1
10 TABLET, COATED ORAL DAILY
Status: DISCONTINUED | OUTPATIENT
Start: 2020-08-25 | End: 2020-08-27 | Stop reason: HOSPADM

## 2020-08-24 RX ORDER — ALBUTEROL SULFATE 90 UG/1
2 AEROSOL, METERED RESPIRATORY (INHALATION) EVERY 6 HOURS PRN
Status: DISCONTINUED | OUTPATIENT
Start: 2020-08-24 | End: 2020-08-27 | Stop reason: HOSPADM

## 2020-08-24 RX ORDER — SODIUM CHLORIDE 0.9 % (FLUSH) 0.9 %
10 SYRINGE (ML) INJECTION
Status: DISCONTINUED | OUTPATIENT
Start: 2020-08-24 | End: 2020-08-27 | Stop reason: HOSPADM

## 2020-08-24 RX ORDER — TALC
6 POWDER (GRAM) TOPICAL NIGHTLY PRN
Status: DISCONTINUED | OUTPATIENT
Start: 2020-08-24 | End: 2020-08-27 | Stop reason: HOSPADM

## 2020-08-24 RX ORDER — FUROSEMIDE 10 MG/ML
20 INJECTION INTRAMUSCULAR; INTRAVENOUS
Status: COMPLETED | OUTPATIENT
Start: 2020-08-24 | End: 2020-08-24

## 2020-08-24 RX ORDER — TRAZODONE HYDROCHLORIDE 100 MG/1
100 TABLET ORAL NIGHTLY PRN
Status: DISCONTINUED | OUTPATIENT
Start: 2020-08-24 | End: 2020-08-27 | Stop reason: HOSPADM

## 2020-08-24 RX ORDER — AMOXICILLIN 250 MG
1 CAPSULE ORAL 2 TIMES DAILY PRN
Status: DISCONTINUED | OUTPATIENT
Start: 2020-08-24 | End: 2020-08-27 | Stop reason: HOSPADM

## 2020-08-24 RX ORDER — HYDROCODONE BITARTRATE AND ACETAMINOPHEN 10; 325 MG/1; MG/1
1 TABLET ORAL EVERY 4 HOURS PRN
Status: DISCONTINUED | OUTPATIENT
Start: 2020-08-24 | End: 2020-08-27 | Stop reason: HOSPADM

## 2020-08-24 RX ADMIN — IOHEXOL 100 ML: 350 INJECTION, SOLUTION INTRAVENOUS at 03:08

## 2020-08-24 RX ADMIN — FUROSEMIDE 20 MG: 10 INJECTION, SOLUTION INTRAMUSCULAR; INTRAVENOUS at 05:08

## 2020-08-24 RX ADMIN — HYDROCODONE BITARTRATE AND ACETAMINOPHEN 1 TABLET: 5; 325 TABLET ORAL at 08:08

## 2020-08-24 RX ADMIN — MELATONIN TAB 3 MG 6 MG: 3 TAB at 10:08

## 2020-08-24 RX ADMIN — MAGNESIUM SULFATE IN WATER 2 G: 40 INJECTION, SOLUTION INTRAVENOUS at 10:08

## 2020-08-24 NOTE — FIRST PROVIDER EVALUATION
Emergency Department TeleTRIAGE Encounter Note      CHIEF COMPLAINT    Chief Complaint   Patient presents with    Chest Pain     Increased weight of 20 lbs since Wednesday.     Shortness of Breath     X 1 week.  Was sent from Pulmonary clinic sent to the ED       VITAL SIGNS   Initial Vitals [08/24/20 1320]   BP Pulse Resp Temp SpO2   (!) 140/87 (!) 115 20 98.1 °F (36.7 °C) (!) 94 %      MAP       --            ALLERGIES    Review of patient's allergies indicates:   Allergen Reactions    Penicillins Anaphylaxis     Other reaction(s): Hives  Other reaction(s): Difficulty breathing       PROVIDER TRIAGE NOTE  Pt is a 44 yo male presenting for increased SOB.  Pt was seen by Pulmonology this morning and was advised to come to the ED for increased swelling and fluid retention.  Pt states he has gained 16lbs in 6 days.      ORDERS  Labs Reviewed - No data to display    ED Orders (720h ago, onward)    Start Ordered     Status Ordering Provider    08/24/20 1333 08/24/20 1333  Vital signs  Every 30 min      Ordered MADHAV LAU L.    08/24/20 1333 08/24/20 1333  Cardiac Monitoring - Adult  Continuous     Comments: Notify Physician If:    Ordered MADHAV LAU L.    08/24/20 1333 08/24/20 1333  Pulse Oximetry Continuous  Continuous      Ordered MADHAV LAU L.    08/24/20 1333 08/24/20 1333  Insert peripheral IV  Once      Ordered MADHAV LAU L.    08/24/20 1333 08/24/20 1333  CBC auto differential  STAT      Ordered MADHAV LAU LJosé    08/24/20 1333 08/24/20 1333  Comprehensive metabolic panel  STAT      Ordered MADHAV LAU L.    08/24/20 1333 08/24/20 1333  Troponin I  STAT      Ordered MADHAV LAU LJosé    08/24/20 1333 08/24/20 1333  Brain natriuretic peptide  STAT      Ordered MADHAV LAU LJosé    08/24/20 1333 08/24/20 1333  Protime-INR  Once      Ordered MADHAV LAU L.    08/24/20 1333 08/24/20 1333  EKG 12-lead  Once      Ordered MADHAV LAU LJosé    08/24/20 1333 08/24/20 1333  X-Ray Chest AP Portable  1  time imaging      Ordered MADHAV LAU    08/24/20 1319 08/24/20 1319  EKG 12-lead  Once  Completed    Completed by EZRA DURAND on 8/24/2020 at  1:30 PM YARA BUENO            Virtual Visit Note: The provider triage portion of this emergency department evaluation and documentation was performed via [employer], a HIPAA-compliant telemedicine application, in concert with a tele-presenter in the room. A face to face patient evaluation with one of my colleagues will occur once the patient is placed in an emergency department room.      DISCLAIMER: This note was prepared with [employer] voice recognition transcription software. Garbled syntax, mangled pronouns, and other bizarre constructions may be attributed to that software system.

## 2020-08-24 NOTE — Clinical Note
Catheter is inserted into the left ventricle. Angiography performed of the LV. Angiography performed via power injection. Injection was 35 mL contrast at 13 mL/s. Power injection PSI was 1000..

## 2020-08-24 NOTE — PROGRESS NOTES
"Subjective:       Patient ID: Dev Sweeney is a 45 y.o. male.    Chief Complaint: Shortness of Breath    HPI   Mr. Sweeney is a new patient 46 y/o presenting  to pulmonary clinic for shortness of breath evaluation.    Reports having shortness of breath over the last one month- tells has become worse over the last few days.   Explains has been following with Urgent Care  and treated for asthma exacerbation- steroids and antibiotics times 2.   Today, he states, "It feels like someone is standing on my chest; its hard to breath."  Explains mother has heart disease- has a stent.  Discloses having a 20 pack year smoking hx- quit 1 month ago.   C/O increased bilateral lower leg swelling. Right >Left.   Traveled to [place] at beginning of month.   Reports appx 25 lbs weight gain in 10 days.   Explains cannot sleep at night because cannot lay flat. Explains tries sitting up with multiple pillows, but does not help.   Reports having asthma as child-"grew out of it" explains has albuterol inhaler as needed. Reports has not required in years. Over the last moth, he reports using albuterol daily.    States, " I have never had anything like this before."    Denies fever or chills.     Social History     Tobacco Use   Smoking Status Former Smoker    Packs/day: 1.00    Years: 15.00    Pack years: 15.00   Smokeless Tobacco Never Used   Tobacco Comment    trying to quit      Review of Systems   Constitutional: Negative for fever, chills, weight loss and night sweats.   HENT: Negative for postnasal drip, rhinorrhea, trouble swallowing and congestion.    Respiratory: Positive for orthopnea and dyspnea on extertion. Negative for apnea, cough, sputum production, choking, chest tightness, wheezing and use of rescue inhaler.    Cardiovascular: Positive for chest pain and leg swelling.   Musculoskeletal: Negative for joint swelling.   Skin: Negative for rash.   Gastrointestinal: Positive for acid reflux.   Neurological: " "Negative for dizziness and light-headedness.   Hematological: Negative for adenopathy.   Psychiatric/Behavioral: Positive for sleep disturbance.       Reviewed allergies and medications.  Reviewed medical and surgical history.  Reviewed social and family history.  Objective:      Vitals:    08/24/20 1240   BP: (!) 126/90   BP Location: Right arm   Patient Position: Sitting   Pulse: (!) 115   SpO2: 98%   Weight: 115.2 kg (254 lb)   Height: 5' 11" (1.803 m)      Physical Exam   Constitutional: He is oriented to person, place, and time. He appears well-developed and well-nourished.   Noted to dyspneic with minimal exertion. He was noted to keep pulling mask away from face to catch his breath.    HENT:   Head: Normocephalic.   Neck: Normal range of motion. Neck supple.   Cardiovascular: Normal rate and normal heart sounds.   Tachycardia    Pulmonary/Chest: Normal expansion and breath sounds normal. He has no wheezes. He has no rhonchi.   Tachypneic    Abdominal: Soft. Bowel sounds are normal. There is no abdominal tenderness.   Musculoskeletal: Normal range of motion.         General: Edema (+3/4 pitting edema noted to RIght leg and  +2/4 pitting edema noted to left leg. ) present.   Lymphadenopathy: No supraclavicular adenopathy is present.     He has no cervical adenopathy.   Neurological: He is alert and oriented to person, place, and time. Gait normal.   Skin: Skin is warm and dry. Nails show no clubbing.   Psychiatric: He has a normal mood and affect. His behavior is normal.   Vitals reviewed.    Personal Diagnostic Review    Reviewed PFts with patient in office. Per my interpretation: Spirometry not suggestive of obstruction; lung volumes show moderate restriction, and DLCO is decreased.     US Lower Extremity Veins Bilateral  Narrative: EXAMINATION:  US LOWER EXTREMITY VEINS BILATERAL    CLINICAL HISTORY:  R/o DVT;    TECHNIQUE:  Duplex and color flow Doppler and dynamic compression was performed of the bilateral " lower extremity veins was performed.    COMPARISON:  None.    FINDINGS:  Right thigh veins: The common femoral, femoral, popliteal, upper greater saphenous, and deep femoral veins are patent and free of thrombus. The veins are normally compressible and have normal phasic flow and augmentation response.    Right calf veins: The visualized calf veins are patent.    Left thigh veins: The common femoral, femoral, popliteal, upper greater saphenous, and deep femoral veins are patent and free of thrombus. The veins are normally compressible and have normal phasic flow and augmentation response.    Left calf veins: The visualized calf veins are patent.  Impression: No evidence of deep venous thrombosis in either lower extremity.    Electronically signed by resident: Yumi Morales  Date:    08/25/2020  Time:    03:32    Electronically signed by: Augusto Burgos MD  Date:    08/25/2020  Time:    03:36         Assessment:       1. SOB (shortness of breath)    2. Other chest pain        No facility-administered encounter medications on file as of 8/24/2020.      Outpatient Encounter Medications as of 8/24/2020   Medication Sig Dispense Refill    albuterol (PROVENTIL) 2.5 mg /3 mL (0.083 %) nebulizer solution Take 3 mLs (2.5 mg total) by nebulization every 6 (six) hours as needed for Wheezing. Rescue 1 Box 0    albuterol (PROVENTIL/VENTOLIN HFA) 90 mcg/actuation inhaler Inhale 2 puffs into the lungs every 6 (six) hours as needed for Wheezing or Shortness of Breath. 18 g 11    HYDROcodone-acetaminophen (NORCO)  mg per tablet       levocetirizine (XYZAL) 5 MG tablet Take 1 tablet (5 mg total) by mouth every evening. 30 tablet 11    omeprazole (PRILOSEC) 40 MG capsule TAKE 1 CAPSULE BY MOUTH EVERY DAY IN THE MORNING 90 capsule 3    rosuvastatin (CRESTOR) 10 MG tablet Take 1 tablet (10 mg total) by mouth once daily. 90 tablet 3    traZODone (DESYREL) 100 MG tablet Take 1 tablet (100 mg total) by mouth nightly as needed  "for Insomnia. 90 tablet 1    azithromycin (Z-CHET) 250 MG tablet Take 2 tablets by mouth x 1 for day 1 Then take 1 tablet by mouth daily for day 2 - 5 6 tablet 0     No orders of the defined types were placed in this encounter.    Plan:       Mr. Sweeney is a 44 y/o M new patient presenting  to pulmonary clinic C/O worsening shortness of breath and CP.   Concerns for fluid overload (25 LBS WEIGHT GAIN IN 10 DAYS), PE, or heart disease contributing to shortness of breath. C/O "someone standing on chest." , Tachypneic, Bilateral lower leg edema (R>L). 20 pack year smoking hx.  Mother with heart disease.   I advised patient needing a more acute work up with having CP and shortness of breath. He agrees to report to ER.   Called report to [facility] charge nurse ER. Patient was brought to ER via W/C.      Problem List Items Addressed This Visit        Other    SOB (shortness of breath)    Current Assessment & Plan     Advised to report to ER- patient agrees   Called charge nurse in ER- report given.   Patient taken via WC to ER         Other chest pain    Overview     Advised to report to ER- patient agrees   Called charge nurse in ER- report given.   Patient taken via WC to ER            Follow up as needed.      This note is dictated on M*Modal word recognition program.  There are word recognition mistakes that are occasionally missed on review.       "

## 2020-08-24 NOTE — ED NOTES
Patient identifiers verified and correct for Dev Sweeney.    LOC: The patient is awake, alert and aware of environment with an appropriate affect, the patient is oriented x 3 and speaking appropriately.  APPEARANCE: Patient resting comfortably and in no acute distress, patient is clean and well groomed, patient's clothing is properly fastened.  SKIN: The skin is warm and dry, color consistent with ethnicity, patient has normal skin turgor and moist mucus membranes, skin intact, no breakdown or bruising noted.  MUSCULOSKELETAL: Patient moving all extremities spontaneously.  RESPIRATORY: Airway is open and patent, respirations are spontaneous. Patient appears short of breath and is tachyepnic at 22 breaths per minute at rest. Pt reports worsening shortness of breath over the past week, worsening with exertion. Pt states he has had a cough and shortness of breath since July; treated with steroids and abx therapy.   CARDIAC: Patient is tachycardic. -115 bpm.  Pt has +4 pitting edema up to his thigh area bilaterally. capillary refill < 3 seconds.   ABDOMEN: Soft and non tender to palpation.

## 2020-08-24 NOTE — ED PROVIDER NOTES
Encounter Date: 8/24/2020       History     Chief Complaint   Patient presents with    Chest Pain     Increased weight of 20 lbs since Wednesday.     Shortness of Breath     X 1 week.  Was sent from Pulmonary clinic sent to the ED     1:51 PM  Patient is a 45-year-old male who presents the ED with shortness of breath.  Patient states since July, he has been having episodes of shortness of breath and cough.  Treated twice with steroids in addition of Z-Carlos during this last trial which he recently finished.  Patient states despite treatment, he did not feel improved.  He followed up with a pulmonologist today who referred him to the ED for his symptoms.  Patient is short of breath at rest which is worse with ambulation and when he lays down. Has a difficult time breathing when laying flat; unable to sleep comfortable due to orthopnea.  He reports improvement in his cough.  Occasion feels as if someone is sitting on his chest. He feels abdominal bloating.  He reports increased weight gain of 20 lb since Wednesday. Denies any lower extremity pain.  Noted lower extremity swelling after his last dose of prednisone.    He uses testosterone on a weekly basis for the past 3 years for recreational purposes.    He quit smoking 1 month ago and was smoking a pack per day for 20 years.    He traveled to [place] about 3 weeks ago.      Future Appointments  8/24/2020  2:15 PM    [facility]MON[facility] FUNCTION         [facility]   [facility]  8/24/2020  2:30 PM    PULMONARY FUNCTION         [facility]   [facility]  8/24/2020  2:45 PM    PULMONARY FUNCTION         [facility]   [facility]  8/24/2020  3:00 PM    Marlin Hazel NP        [facility]   [place]          Review of patient's allergies indicates:   Allergen Reactions    Penicillins Anaphylaxis     Other reaction(s): Hives  Other reaction(s): Difficulty breathing     Past Medical History:   Diagnosis Date    Allergy     Asthma     GERD (gastroesophageal reflux disease)      Hyperlipidemia     Hypertension     Insomnia      Past Surgical History:   Procedure Laterality Date    FRACTURE SURGERY      HERNIA REPAIR      ORBITAL FRACTURE SURGERY      left    VASECTOMY       Family History   Problem Relation Age of Onset    Heart disease Mother     Cancer Paternal Grandmother     Diabetes Paternal Grandmother     Cancer Paternal Grandfather     Colon polyps Neg Hx      Social History     Tobacco Use    Smoking status: Former Smoker     Packs/day: 1.00     Years: 15.00     Pack years: 15.00    Smokeless tobacco: Never Used    Tobacco comment: trying to quit   Substance Use Topics    Alcohol use: Yes     Alcohol/week: 1.0 standard drinks     Types: 1 Shots of liquor per week    Drug use: No     Review of Systems   Constitutional: Positive for unexpected weight change. Negative for chills and fever.   HENT: Negative for sore throat.    Respiratory: Positive for cough and shortness of breath.    Cardiovascular: Positive for chest pain and leg swelling.   Gastrointestinal: Negative for abdominal pain, nausea and vomiting.        +abdominal bloating.   Genitourinary: Negative for dysuria.   Musculoskeletal: Negative for back pain.   Skin: Negative for rash.   Neurological: Negative for weakness.   Hematological: Does not bruise/bleed easily.       Physical Exam     Initial Vitals [08/24/20 1320]   BP Pulse Resp Temp SpO2   (!) 140/87 (!) 115 20 98.1 °F (36.7 °C) (!) 94 %      MAP       --         Physical Exam    Vitals reviewed.  Constitutional: He appears well-developed and well-nourished. He is not diaphoretic. He is cooperative.  Non-toxic appearance. He does not have a sickly appearance. He does not appear ill. No distress. Face mask in place.   HENT:   Head: Normocephalic and atraumatic.   Nose: Nose normal.   Eyes: Conjunctivae and EOM are normal.   Neck: Normal range of motion.   Cardiovascular: Tachycardia present.    2+ pitting edema to bilateral lower extremities.   No unilateral leg swelling.  No calf tenderness.   Pulmonary/Chest: Breath sounds normal. No accessory muscle usage. Tachypnea noted. No respiratory distress. He has no decreased breath sounds. He has no wheezes. He has no rales.   Dyspnea after walking to bathroom. Speaking in clear and full sentences, although appears SOB after prolong speaking.  No cough, bronchospasms.  94% on RA.   Abdominal: Soft. He exhibits no distension. There is no abdominal tenderness. There is no rebound and no guarding.   Musculoskeletal: Normal range of motion.   Neurological: He is alert. He has normal strength.   Skin: Skin is warm and dry. No erythema.         ED Course   Procedures  Labs Reviewed   CBC W/ AUTO DIFFERENTIAL - Abnormal; Notable for the following components:       Result Value    Mean Corpuscular Hemoglobin 26.8 (*)     Mean Corpuscular Hemoglobin Conc 30.4 (*)     RDW 16.4 (*)     Immature Granulocytes 0.6 (*)     Gran # (ANC) 9.8 (*)     Immature Grans (Abs) 0.08 (*)     Mono # 1.1 (*)     Gran% 79.2 (*)     Lymph% 9.7 (*)     All other components within normal limits   COMPREHENSIVE METABOLIC PANEL - Abnormal; Notable for the following components:    Total Bilirubin 1.3 (*)      (*)      (*)     All other components within normal limits   B-TYPE NATRIURETIC PEPTIDE - Abnormal; Notable for the following components:     (*)     All other components within normal limits   TROPONIN I   PROTIME-INR        ECG Results          EKG 12-lead (Final result)  Result time 08/24/20 15:26:08    Final result by Interface, Lab In [facility] (08/24/20 15:26:08)                 Narrative:    Test Reason : R07.9,    Vent. Rate : 113 BPM     Atrial Rate : 113 BPM     P-R Int : 136 ms          QRS Dur : 096 ms      QT Int : 328 ms       P-R-T Axes : 053 033 131 degrees     QTc Int : 449 ms    Sinus tachycardia  Left atrial enlargement  Nonspecific T wave abnormality  Abnormal ECG  When compared with ECG of 23-MAR-2005  10:46,  Vent. rate has increased BY  59 BPM  Nonspecific T wave abnormality now evident in Inferior leads  T wave inversion now evident in Lateral leads  Confirmed by BON JACOBSEN MD ([id number]) on 8/24/2020 3:25:58 PM    Referred By: [email or web address]   SELF           Confirmed By:BON JACOBSEN MD                            Imaging Results          CTA Chest Non-Coronary - PE Study (Final result)  Result time 08/24/20 15:54:22    Final result by Milton Yadav MD (08/24/20 15:54:22)                 Impression:      1. Exam limitations described above, within these constraints, no convincing pulmonary arterial filling defect to the level of the lobar/segmental arteries noting most limitation involving the pulmonary arteries to the bilateral lower lobes as detailed above.  Correlation of these findings with D-dimer and/or lower extremity ultrasound is recommended.  2. There are a few scattered ground-glass attenuating foci along the periphery of the left upper lobe and along the anterior aspect of the right upper lobe.  This could reflect sequela of motion artifact however viral process can present in a similar fashion and correlation is recommended.  3. Nonspecific bilateral gynecomastia.  4. Moderate hiatal hernia.  5. Cardiomegaly and several additional findings above.      Electronically signed by: Milton Yadav MD  Date:    08/24/2020  Time:    15:54             Narrative:    EXAMINATION:  CTA CHEST NON CORONARY    CLINICAL HISTORY:  PE suspected, high pretest prob;    TECHNIQUE:  Low dose axial images, sagittal and coronal reformations were obtained from the thoracic inlet to the lung bases following the IV administration of 100 mL of Omnipaque 350.  Contrast timing was optimized to evaluate the pulmonary arteries.  MIP images were performed.    COMPARISON:  None    FINDINGS:  The structures at the base of the neck are grossly unremarkable.  Scattered nonenlarged mediastinal lymph nodes are noted.  The esophagus is  unremarkable.  The thoracic aorta tapers normally noting atherosclerotic calcification along its course.  The heart is enlarged without pericardial effusion.  The visualized portions of the kidneys, adrenal glands, spleen, pancreas and liver are grossly unremarkable.  There is a moderate hiatal hernia.  There is bilateral gynecomastia.  There is reflux of contrast within the hepatic veins, could reflect sequela of elevated right heart pressures.    The airways are patent.  Evaluation of the pulmonary parenchyma is somewhat limited secondary to respiratory motion artifact and artifact from habitus.  There is bilateral basilar dependent atelectasis.  There are a few scattered foci of ground-glass attenuation, most notably along the periphery of the left upper lobe.  No large focal consolidation.  No pneumothorax.  There is fluid along the fissure on the right.    Bolus timing is adequate for evaluation of pulmonary thromboembolism noting exam is limited secondary to motion artifact and artifact from habitus.  Allowing for this, no convincing pulmonary arterial filling defect to the level of the proximal segmental arteries bilaterally to suggest pulmonary thromboembolism.  Please note, there is particularly limited evaluation of the pulmonary arteries to the bilateral lower lobes, and embolus distal to the level of the lobar arteries cannot be exclude.  Degenerative changes are noted of the spine.                               X-Ray Chest AP Portable (Final result)  Result time 08/24/20 14:24:25    Final result by Luis F Parker Jr., MD (08/24/20 14:24:25)                 Impression:      Findings most consistent with mild degree of congestive failure.      Electronically signed by: Luis F Parker MD  Date:    08/24/2020  Time:    14:24             Narrative:    EXAMINATION:  XR CHEST AP PORTABLE    CLINICAL HISTORY:  CHF;    TECHNIQUE:  Single frontal view of the chest was performed.    COMPARISON:  August 15,  2020.    FINDINGS:  Monitoring leads are in place.  Heart is enlarged.  Mild increase in the pulmonary vascular and interstitial markings.  No confluent consolidation.                                 Medical Decision Making:   History:   Old Medical Records: I decided to obtain old medical records.  Old Records Summarized: records from clinic visits and records from previous admission(s).       <> Summary of Records: See HPI.   Initial Assessment:   Patient is a 45-year-old male who presents the ED with shortness of breath.  Patient states since July, he has been having episodes of shortness of breath and cough.    Differential Diagnosis:   Includes but is not limited to pulmonary embolism, heart failure, less likely ACS, bronchitis, asthma, reactive airway disease, viral syndrome, sleep apnea, deconditioning.  Clinical Tests:   Lab Tests: Reviewed and Ordered  Radiological Study: Ordered and Reviewed  Medical Tests: Ordered and Reviewed  ED Management:  On review of his past chest x-ray, it seems like patient has some cardiomegaly.  Given history of shortness of breath and orthopnea, possibly this may be new onset heart failure.  However, he has risk factors for venous thromboembolism such as his testosterone use, smoking, and obesity.  Will initiate workup including CTA to rule a PE and continue to monitor.    CBC with WBC at 12.4.  No anemia.  CMP without electrolyte abnormalities.  Hyperbilirubinemia at 1.3 with elevated liver enzymes with AST and ALT at 139 and 116, respectively.  BNP elevated at 122.  Troponin within normal limits.  Chest x-ray with mild degree of congestive failure.    CTA does not show convincing evidence of PE.  Few scattered ground-glass attenuation.  Cardiomegaly.    Patient updated with results.  Given history, physical exam, and workup today, likely that patient has new onset heart failure.  I will give him Lasix 20 mg IV and measure intake and output.  He is agreeable to the admission.   Case was discussed with Hospital Medicine who will place in observation.    Other:   I have discussed this case with another health care provider.    I have reviewed patient's chart and discussed this case with my supervising MD.     Veronica Smith PA-C  Emergent Department  [facility]  [facility] #[id number] or #[phone]                Attending Attestation:     Physician Attestation Statement for NP/PA:   I discussed this assessment and plan of this patient with the NP/PA, but I did not personally examine the patient. The face to face encounter was performed by the NP/PA.    Other NP/PA Attestation Additions:      Medical Decision Making: Given labs and clinical presentation concerning for new-onset CHF, strongly advise admission for echo and further evaluation in addition to diuresis.                                   Clinical Impression:       ICD-10-CM ICD-9-CM   1. Acute heart failure, unspecified heart failure type  I50.9 428.9   2. Chest pain  R07.9 786.50   3. Shortness of breath  R06.02 786.05         Disposition:   Disposition: Placed in Observation  Condition: Stable     ED Disposition Condition    Observation                           Veronica Smith PA-C  08/24/20 [id number]       Joselin Ling MD  08/25/20 [id number]

## 2020-08-24 NOTE — ED TRIAGE NOTES
Patient presents to the ED with shortness of breath worsening with exertion over the past week. Patient has bilateral +4 pitting edema up to the thighs. Pt reports taking steroids over the past week and finished last week. Pt also states he has been using IM testosterone, weekly for the last three years. Pt is tachycardic at 115

## 2020-08-24 NOTE — Clinical Note
135 ml injected throughout the case. 165 mL total wasted during the case. 300 mL total used in the case.

## 2020-08-25 PROBLEM — R07.89 OTHER CHEST PAIN: Status: ACTIVE | Noted: 2020-08-25

## 2020-08-25 PROBLEM — R06.02 SOB (SHORTNESS OF BREATH): Status: ACTIVE | Noted: 2020-08-25

## 2020-08-25 LAB
ALBUMIN SERPL BCP-MCNC: 3.4 G/DL (ref 3.5–5.2)
ALP SERPL-CCNC: 52 U/L (ref 55–135)
ALT SERPL W/O P-5'-P-CCNC: 93 U/L (ref 10–44)
ANION GAP SERPL CALC-SCNC: 11 MMOL/L (ref 8–16)
ASCENDING AORTA: 4.01 CM
AST SERPL-CCNC: 100 U/L (ref 10–40)
AV INDEX (PROSTH): 1.19
AV MEAN GRADIENT: 1 MMHG
AV PEAK GRADIENT: 2 MMHG
AV VALVE AREA: 5.58 CM2
AV VELOCITY RATIO: 1
BILIRUB DIRECT SERPL-MCNC: 0.8 MG/DL (ref 0.1–0.3)
BILIRUB SERPL-MCNC: 1.6 MG/DL (ref 0.1–1)
BSA FOR ECHO PROCEDURE: 2.34 M2
BUN SERPL-MCNC: 9 MG/DL (ref 6–20)
CALCIUM SERPL-MCNC: 9.4 MG/DL (ref 8.7–10.5)
CHLORIDE SERPL-SCNC: 106 MMOL/L (ref 95–110)
CHOLEST SERPL-MCNC: 114 MG/DL (ref 120–199)
CHOLEST/HDLC SERPL: 5.7 {RATIO} (ref 2–5)
CO2 SERPL-SCNC: 24 MMOL/L (ref 23–29)
CREAT SERPL-MCNC: 0.9 MG/DL (ref 0.5–1.4)
CV ECHO LV RWT: 0.36 CM
D DIMER PPP IA.FEU-MCNC: 1.31 MG/L FEU
DOP CALC AO PEAK VEL: 0.72 M/S
DOP CALC AO VTI: 9.26 CM
DOP CALC LVOT AREA: 4.7 CM2
DOP CALC LVOT DIAMETER: 2.44 CM
DOP CALC LVOT PEAK VEL: 0.72 M/S
DOP CALC LVOT STROKE VOLUME: 51.69 CM3
DOP CALCLVOT PEAK VEL VTI: 11.06 CM
E WAVE DECELERATION TIME: 154.52 MSEC
E/A RATIO: 3.73
E/E' RATIO: 24.25 M/S
ECHO LV POSTERIOR WALL: 1.23 CM (ref 0.6–1.1)
EST. GFR  (AFRICAN AMERICAN): >60 ML/MIN/1.73 M^2
EST. GFR  (NON AFRICAN AMERICAN): >60 ML/MIN/1.73 M^2
FRACTIONAL SHORTENING: 6 % (ref 28–44)
GLUCOSE SERPL-MCNC: 73 MG/DL (ref 70–110)
HAV IGM SERPL QL IA: NEGATIVE
HBV CORE IGM SERPL QL IA: NEGATIVE
HBV SURFACE AG SERPL QL IA: NEGATIVE
HCV AB SERPL QL IA: NEGATIVE
HDLC SERPL-MCNC: 20 MG/DL (ref 40–75)
HDLC SERPL: 17.5 % (ref 20–50)
HIV 1+2 AB+HIV1 P24 AG SERPL QL IA: NEGATIVE
INTERVENTRICULAR SEPTUM: 0.89 CM (ref 0.6–1.1)
LA MAJOR: 4.63 CM
LA MINOR: 5.66 CM
LA WIDTH: 4.21 CM
LACTATE SERPL-SCNC: 1.2 MMOL/L (ref 0.5–2.2)
LDLC SERPL CALC-MCNC: 74.6 MG/DL (ref 63–159)
LEFT INTERNAL DIMENSION IN SYSTOLE: 6.41 CM (ref 2.1–4)
LEFT VENTRICLE DIASTOLIC VOLUME INDEX: 104.44 ML/M2
LEFT VENTRICLE DIASTOLIC VOLUME: 238.26 ML
LEFT VENTRICLE MASS INDEX: 144 G/M2
LEFT VENTRICLE SYSTOLIC VOLUME INDEX: 91.8 ML/M2
LEFT VENTRICLE SYSTOLIC VOLUME: 209.43 ML
LEFT VENTRICULAR INTERNAL DIMENSION IN DIASTOLE: 6.79 CM (ref 3.5–6)
LEFT VENTRICULAR MASS: 328.66 G
LV LATERAL E/E' RATIO: 24.25 M/S
LV SEPTAL E/E' RATIO: 24.25 M/S
MV PEAK A VEL: 0.26 M/S
MV PEAK E VEL: 0.97 M/S
MV STENOSIS PRESSURE HALF TIME: 44.81 MS
MV VALVE AREA P 1/2 METHOD: 4.91 CM2
NONHDLC SERPL-MCNC: 94 MG/DL
POTASSIUM SERPL-SCNC: 4.1 MMOL/L (ref 3.5–5.1)
PROT SERPL-MCNC: 6 G/DL (ref 6–8.4)
RA MAJOR: 5.3 CM
RA PRESSURE: 15 MMHG
RA WIDTH: 5.04 CM
RIGHT VENTRICULAR END-DIASTOLIC DIMENSION: 5.31 CM
RV TISSUE DOPPLER FREE WALL SYSTOLIC VELOCITY 1 (APICAL 4 CHAMBER VIEW): 8.08 CM/S
SINUS: 3.85 CM
SODIUM SERPL-SCNC: 141 MMOL/L (ref 136–145)
STJ: 3.75 CM
TDI LATERAL: 0.04 M/S
TDI SEPTAL: 0.04 M/S
TDI: 0.04 M/S
TRICUSPID ANNULAR PLANE SYSTOLIC EXCURSION: 1.55 CM
TRIGL SERPL-MCNC: 97 MG/DL (ref 30–150)
TROPONIN I SERPL DL<=0.01 NG/ML-MCNC: 0.04 NG/ML (ref 0–0.03)
TSH SERPL DL<=0.005 MIU/L-ACNC: 0.93 UIU/ML (ref 0.4–4)

## 2020-08-25 PROCEDURE — G0378 HOSPITAL OBSERVATION PER HR: HCPCS

## 2020-08-25 PROCEDURE — 93005 ELECTROCARDIOGRAM TRACING: CPT

## 2020-08-25 PROCEDURE — 96376 TX/PRO/DX INJ SAME DRUG ADON: CPT

## 2020-08-25 PROCEDURE — 25000242 PHARM REV CODE 250 ALT 637 W/ HCPCS: Performed by: HOSPITALIST

## 2020-08-25 PROCEDURE — 63600175 PHARM REV CODE 636 W HCPCS: Performed by: HOSPITALIST

## 2020-08-25 PROCEDURE — 93010 EKG 12-LEAD: ICD-10-PCS | Mod: ,,, | Performed by: INTERNAL MEDICINE

## 2020-08-25 PROCEDURE — 80048 BASIC METABOLIC PNL TOTAL CA: CPT

## 2020-08-25 PROCEDURE — 80074 ACUTE HEPATITIS PANEL: CPT

## 2020-08-25 PROCEDURE — 93010 ELECTROCARDIOGRAM REPORT: CPT | Mod: ,,, | Performed by: INTERNAL MEDICINE

## 2020-08-25 PROCEDURE — 80076 HEPATIC FUNCTION PANEL: CPT

## 2020-08-25 PROCEDURE — 80061 LIPID PANEL: CPT

## 2020-08-25 PROCEDURE — 85379 FIBRIN DEGRADATION QUANT: CPT

## 2020-08-25 PROCEDURE — 99226 PR SUBSEQUENT OBSERVATION CARE,LEVEL III: CPT | Mod: ,,, | Performed by: STUDENT IN AN ORGANIZED HEALTH CARE EDUCATION/TRAINING PROGRAM

## 2020-08-25 PROCEDURE — 36415 COLL VENOUS BLD VENIPUNCTURE: CPT

## 2020-08-25 PROCEDURE — 25000003 PHARM REV CODE 250: Performed by: HOSPITALIST

## 2020-08-25 PROCEDURE — 84484 ASSAY OF TROPONIN QUANT: CPT

## 2020-08-25 PROCEDURE — 86703 HIV-1/HIV-2 1 RESULT ANTBDY: CPT

## 2020-08-25 PROCEDURE — 83605 ASSAY OF LACTIC ACID: CPT

## 2020-08-25 PROCEDURE — 84443 ASSAY THYROID STIM HORMONE: CPT

## 2020-08-25 PROCEDURE — 99226 PR SUBSEQUENT OBSERVATION CARE,LEVEL III: ICD-10-PCS | Mod: ,,, | Performed by: STUDENT IN AN ORGANIZED HEALTH CARE EDUCATION/TRAINING PROGRAM

## 2020-08-25 RX ADMIN — HYDROCODONE BITARTRATE AND ACETAMINOPHEN 1 TABLET: 10; 325 TABLET ORAL at 06:08

## 2020-08-25 RX ADMIN — ALBUTEROL SULFATE 2 PUFF: 90 AEROSOL, METERED RESPIRATORY (INHALATION) at 10:08

## 2020-08-25 RX ADMIN — FUROSEMIDE 20 MG: 10 INJECTION, SOLUTION INTRAMUSCULAR; INTRAVENOUS at 09:08

## 2020-08-25 RX ADMIN — ROSUVASTATIN CALCIUM 10 MG: 10 TABLET, FILM COATED ORAL at 09:08

## 2020-08-25 RX ADMIN — ACETAMINOPHEN 650 MG: 325 TABLET ORAL at 04:08

## 2020-08-25 RX ADMIN — FUROSEMIDE 20 MG: 10 INJECTION, SOLUTION INTRAMUSCULAR; INTRAVENOUS at 04:08

## 2020-08-25 RX ADMIN — ALBUTEROL SULFATE 2 PUFF: 90 AEROSOL, METERED RESPIRATORY (INHALATION) at 05:08

## 2020-08-25 RX ADMIN — HYDROCODONE BITARTRATE AND ACETAMINOPHEN 1 TABLET: 10; 325 TABLET ORAL at 09:08

## 2020-08-25 RX ADMIN — MELATONIN TAB 3 MG 6 MG: 3 TAB at 09:08

## 2020-08-25 RX ADMIN — PANTOPRAZOLE SODIUM 40 MG: 40 TABLET, DELAYED RELEASE ORAL at 09:08

## 2020-08-25 NOTE — HPI
Mr. Sweeney is a 45 year old male who presents to the ER for evaluation of shortness of breath and leg swelling.  He mentions that since July, he has been having worsening shortness of breath, cough, and leg swelling.  He went to an Urgent Care twice since that time where he was given Azithromycin and Prednisone.  Despite this, his breathing didn't improve.  He endorses shortness of breath at rest and with exertion.  He is unable to sleep and lie flat, so has been attempting to sleep in a chair.  He endorses BLE pitting edema as well as about a 20lb weight gain.  He went to see a Pulmonologist on the day of admission, who sent him to the ER for further evaluation.  Of note, he quit smoking 1 month prior to admission (20 pack year) and uses testosterone weekly for recreational purposes.  He denies any chest pain, fevers or chills.     Upon arrival to the ER, vitals were temp 98.1F,  and /87 satting 94% on room air.  Labs showed  and elevated AST/ALT and bilirubin.  CXR showed CHF and CTA showed possibly opacities, likely artifact.  He was given Lasix 20mg IV and was admitted to Hospital Medicine for further management. ECHO reveals EF 10%. Cardiology consulted for new-onset HF.

## 2020-08-25 NOTE — CONSULTS
[facility]  Cardiology  Consult Note    Patient Name: Dev Sweeney  MRN: [id number]  Admission Date: 8/24/2020  Hospital Length of Stay: 0 days  Code Status: Full Code   Attending Provider: Graham Polanco MD   Consulting Provider: Steven Jacobs MD  Primary Care Physician: Loki Alba DO  Principal Problem:New onset of congestive heart failure    Patient information was obtained from patient and ER records.     Inpatient consult to Cardiology  Consult performed by: Steven Jacobs MD  Consult ordered by: Graham Polanco MD        Subjective:     Chief Complaint:  CHF     HPI:   Mr. Sweeney is a 45 year old male who presents to the ER for evaluation of shortness of breath and leg swelling.  He mentions that since July, he has been having worsening shortness of breath, cough, and leg swelling.  He went to an Urgent Care twice since that time where he was given Azithromycin and Prednisone.  Despite this, his breathing didn't improve.  He endorses shortness of breath at rest and with exertion.  He is unable to sleep and lie flat, so has been attempting to sleep in a chair.  He endorses BLE pitting edema as well as about a 20lb weight gain.  He went to see a Pulmonologist on the day of admission, who sent him to the ER for further evaluation.  Of note, he quit smoking 1 month prior to admission (20 pack year) and uses testosterone weekly for recreational purposes.  He denies any chest pain, fevers or chills.     Upon arrival to the ER, vitals were temp 98.1F,  and /87 satting 94% on room air.  Labs showed  and elevated AST/ALT and bilirubin.  CXR showed CHF and CTA showed possibly opacities, likely artifact.  He was given Lasix 20mg IV and was admitted to Hospital Medicine for further management. ECHO reveals EF 10%. Cardiology consulted for new-onset HF.    Past Medical History:   Diagnosis Date    Allergy     Asthma     GERD (gastroesophageal reflux disease)      Hyperlipidemia     Hypertension     Insomnia        Past Surgical History:   Procedure Laterality Date    FRACTURE SURGERY      HERNIA REPAIR      ORBITAL FRACTURE SURGERY      left    VASECTOMY         Review of patient's allergies indicates:   Allergen Reactions    Penicillins Anaphylaxis     Other reaction(s): Hives  Other reaction(s): Difficulty breathing       No current facility-administered medications on file prior to encounter.      Current Outpatient Medications on File Prior to Encounter   Medication Sig    albuterol (PROVENTIL/VENTOLIN HFA) 90 mcg/actuation inhaler Inhale 2 puffs into the lungs every 6 (six) hours as needed for Wheezing or Shortness of Breath.    azithromycin (Z-CHET) 250 MG tablet Take 2 tablets by mouth x 1 for day 1 Then take 1 tablet by mouth daily for day 2 - 5    levocetirizine (XYZAL) 5 MG tablet Take 1 tablet (5 mg total) by mouth every evening.    omeprazole (PRILOSEC) 40 MG capsule TAKE 1 CAPSULE BY MOUTH EVERY DAY IN THE MORNING    rosuvastatin (CRESTOR) 10 MG tablet Take 1 tablet (10 mg total) by mouth once daily.    traZODone (DESYREL) 100 MG tablet Take 1 tablet (100 mg total) by mouth nightly as needed for Insomnia.    albuterol (PROVENTIL) 2.5 mg /3 mL (0.083 %) nebulizer solution Take 3 mLs (2.5 mg total) by nebulization every 6 (six) hours as needed for Wheezing. Rescue    HYDROcodone-acetaminophen (NORCO)  mg per tablet      Family History     Problem Relation (Age of Onset)    Cancer Paternal Grandmother, Paternal Grandfather    Diabetes Paternal Grandmother    Heart disease Mother        Tobacco Use    Smoking status: Former Smoker     Packs/day: 1.00     Years: 15.00     Pack years: 15.00    Smokeless tobacco: Never Used    Tobacco comment: trying to quit   Substance and Sexual Activity    Alcohol use: Yes     Alcohol/week: 1.0 standard drinks     Types: 1 Shots of liquor per week    Drug use: No    Sexual activity: Not on file      Review of Systems   Cardiovascular: Positive for leg swelling and orthopnea. Negative for chest pain and syncope.   Respiratory: Positive for shortness of breath. Negative for wheezing.    Gastrointestinal: Negative for nausea and vomiting.   Neurological: Negative for light-headedness.     Objective:     Vital Signs (Most Recent):  Temp: 97.2 °F (36.2 °C) (08/25/20 0804)  Pulse: (!) 114 (08/25/20 1147)  Resp: 19 (08/25/20 1053)  BP: (!) 135/91 (08/25/20 1053)  SpO2: (!) 92 % (08/25/20 1053) Vital Signs (24h Range):  Temp:  [97.2 °F (36.2 °C)-97.8 °F (36.6 °C)] 97.2 °F (36.2 °C)  Pulse:  [] 114  Resp:  [17-20] 19  SpO2:  [92 %-96 %] 92 %  BP: (128-160)/(78-94) 135/91     Weight: 109.2 kg (240 lb 11.9 oz)  Body mass index is 33.59 kg/m².    SpO2: (!) 92 %  O2 Device (Oxygen Therapy): room air      Intake/Output Summary (Last 24 hours) at 8/25/2020 1451  Last data filed at 8/25/2020 1200  Gross per 24 hour   Intake 1170 ml   Output 5375 ml   Net -4205 ml       Lines/Drains/Airways     Peripheral Intravenous Line                 Peripheral IV - Single Lumen 08/24/20 1536 20 G Left Antecubital less than 1 day                Physical Exam   Constitutional: He is oriented to person, place, and time. He appears well-developed. No distress.   HENT:   Head: Normocephalic and atraumatic.   Neck: JVD present.   Cardiovascular: Normal rate, regular rhythm, normal heart sounds and intact distal pulses. Exam reveals no gallop and no friction rub.   No murmur heard.  Pulmonary/Chest: Effort normal. No respiratory distress. He has no wheezes. He has no rales.   Minimal distress   Musculoskeletal:         General: Edema (Up to knees) present.   Neurological: He is oriented to person, place, and time.   Skin: Skin is warm and dry. He is not diaphoretic.   Nursing note and vitals reviewed.      Significant Labs:   BMP:   Recent Labs   Lab 08/24/20  1349 08/24/20  1836 08/25/20  0438     --  73     --  141   K  4.4  --  4.1     --  106   CO2 24  --  24   BUN 8  --  9   CREATININE 1.1  --  0.9   CALCIUM 9.3  --  9.4   MG 1.6 1.4*  --    , CMP   Recent Labs   Lab 08/24/20  1349 08/25/20  0438    141   K 4.4 4.1    106   CO2 24 24    73   BUN 8 9   CREATININE 1.1 0.9   CALCIUM 9.3 9.4   PROT 6.2 6.0   ALBUMIN 3.5 3.4*   BILITOT 1.3* 1.6*   ALKPHOS 58 52*   * 100*   * 93*   ANIONGAP 9 11   ESTGFRAFRICA >60.0 >60.0   EGFRNONAA >60.0 >60.0   , CBC   Recent Labs   Lab 08/24/20  1349   WBC 12.43   HGB 14.7   HCT 48.3      , Lipid Panel   Recent Labs   Lab 08/25/20  0438   CHOL 114*   HDL 20*   LDLCALC 74.6   TRIG 97   CHOLHDL 17.5*   , Troponin   Recent Labs   Lab 08/24/20  1349 08/24/20  2033 08/25/20  0438   TROPONINI 0.023 0.029* 0.042*    and All pertinent lab results from the last 24 hours have been reviewed.    Significant Imaging: Echocardiogram:   Transthoracic echo (TTE) complete (Cupid Only):   Results for orders placed or performed during the hospital encounter of 08/24/20   Echo Color Flow Doppler? Yes   Result Value Ref Range    Ascending aorta 4.01 cm    STJ 3.75 cm    AV mean gradient 1 mmHg    Ao peak john 0.72 m/s    Ao VTI 9.26 cm    IVS 0.89 0.6 - 1.1 cm    Left Atrium Major Axis 4.63 cm    Left Atrium Minor Axis 5.66 cm    LVIDD 6.79 (A) 3.5 - 6.0 cm    LVIDS 6.41 (A) 2.1 - 4.0 cm    LVOT diameter 2.44 cm    LVOT peak VTI 11.06 cm    PW 1.23 (A) 0.6 - 1.1 cm    MV Peak A John 0.26 m/s    E wave decelartion time 154.52 msec    MV Peak E John 0.97 m/s    RA Major Axis 5.30 cm    RA Width 5.04 cm    RVDD 5.31 cm    Sinus 3.85 cm    TAPSE 1.55 cm    TDI LATERAL 0.04 m/s    TDI SEPTAL 0.04 m/s    LA WIDTH 4.21 cm    MV stenosis pressure 1/2 time 44.81 ms    LV Diastolic Volume 238.26 mL    LV Systolic Volume 209.43 mL    RV S' 8.08 cm/s    LVOT peak john 0.72 m/s    LV LATERAL E/E' RATIO 24.25 m/s    LV SEPTAL E/E' RATIO 24.25 m/s    FS 6 %    LV mass 328.66 g    Left  Ventricle Relative Wall Thickness 0.36 cm    AV valve area 5.58 cm2    AV Velocity Ratio 1.00     AV index (prosthetic) 1.19     MV valve area p 1/2 method 4.91 cm2    E/A ratio 3.73     Mean e' 0.04 m/s    LVOT area 4.7 cm2    LVOT stroke volume 51.69 cm3    AV peak gradient 2 mmHg    E/E' ratio 24.25 m/s    LV Systolic Volume Index 91.8 mL/m2    LV Diastolic Volume Index 104.44 mL/m2    LV Mass Index 144 g/m2    BSA 2.34 m2    Right Atrial Pressure (from IVC) 15 mmHg    Narrative    · Eccentric left ventricular hypertrophy.  · Severely decreased left ventricular systolic function. The estimated   ejection fraction is 10%.  · Severe left ventricular enlargement.  · Grade III (severe) left ventricular diastolic dysfunction consistent   with restrictive physiology.  · No wall motion abnormalities.  · Moderately to severely reduced right ventricular systolic function.  · Moderate right ventricular enlargement.  · Mild mitral regurgitation.  · Mild tricuspid regurgitation.  · Elevated central venous pressure (15 mmHg).        EKG:    Vent. Rate : 110 BPM  Sinus tachycardia   Possible Left atrial enlargement   Nonspecific T wave abnormality   Abnormal ECG   When compared with ECG of 24-AUG-2020 13:25,   No significant change was found     X-Ray: CXR:    EXAMINATION:  XR CHEST AP PORTABLE     CLINICAL HISTORY:  CHF;     TECHNIQUE:  Single frontal view of the chest was performed.     COMPARISON:  August 15, 2020.     FINDINGS:  Monitoring leads are in place.  Heart is enlarged.  Mild increase in the pulmonary vascular and interstitial markings.  No confluent consolidation.     Impression:     Findings most consistent with mild degree of congestive failure.    Assessment and Plan:     * New onset of congestive heart failure  Patient is a 45 year old male with no significant PMHx who presents with 1 month of worsening SOB, orthopnea, lower extremity swelling. Has been unable to lie flat to sleep, sitting almost upright in a  chair at night. Patient denies any chest pain. Reports seeing PCP recently for SOB was prescribed azithromycin and has been taking that. Has been taking crestor 10 mg for cholesterol. Patient reports taking testosterone weekly for recreational purposes. Endorses heavy drinking periodically, as much as a bottle of whiskey in a day on some days. Quit tobacco 1 month ago, 20 pack-year history.    New onset HF with EF of 10% of unknown cause. Likely NICM 2/2 to EtOH, less likely CAD.  Discussed with patient the potential role of EtOH and benefits of abstaining  Patient verbalizes understanding and is willing to quit  - TTE demonstrates: EF 10%, eccentric LVH, LVIDD 6.8 cm, GIII LVDD, mod-to-sev RV dysfunction, CVP 15  - EKG: Sinus tachycardia with possible left atrial enlargement and nonspecific T waves changes  - Troponin 0.023 -> 0.029 -> 0.042  -     -- Fluid restriction at 1500 cc with strict I/Os and daily STANDING weights  -- Continue diuresis with 2-3 L goal, good UOP  -- Recommend afternoon BMP  -- Maintain on telemetry and daily EKGs   -- Up to date risk stratification: TSH, Lipids, HbA1c with optimization of risk factors is necessary  -- Check Electrolytes, keep Mg >2 and K >4  -- Ambulate as tolerated  -- Optimal therapy at this time to include:   -- Continue crestor   -- Start Coreg 6.25 mg BID     -- Will reevaluate in 1-2 days for initiation of ARB or entresto   -- Would like to monitor before starting additional therapy as patient is naive to these medications  -- Plans for outpatient will include stress test to rule out ischemic causes   -- Heart failure clinic follow up    -- Follow up transitional care visit for heart failure at discharge   -- Discharge with event monitor (for development of A.Fib with EtOH use)        VTE Risk Mitigation (From admission, onward)         Ordered     IP VTE LOW RISK PATIENT  Once      08/24/20 1820     Place sequential compression device  Until discontinued       08/24/20 8144                Thank you for your consult. I will follow-up with patient. Please contact us if you have any additional questions.    Steven Jacobs MD  Cardiology   [facility]

## 2020-08-25 NOTE — PLAN OF CARE
CM met with patient at the bedside to discuss discharge planning assessment. Pt lives with spouse and has transportation home at discharge.  Pt verified PCP and Pharmacy. CM will continue to follow for discharge needs.         08/25/20 7357   Discharge Assessment   Assessment Type Discharge Planning Assessment   Confirmed/corrected address and phone number on facesheet? Yes   Assessment information obtained from? Patient   Expected Length of Stay (days) 3   Communicated expected length of stay with patient/caregiver yes   Prior to hospitilization cognitive status: Alert/Oriented   Prior to hospitalization functional status: Independent   Current cognitive status: Alert/Oriented   Current Functional Status: Independent   Lives With spouse   Able to Return to Prior Arrangements yes   Is patient able to care for self after discharge? Yes   Patient's perception of discharge disposition home or selfcare   Readmission Within the Last 30 Days no previous admission in last 30 days   Patient currently being followed by outpatient case management? No   Patient currently receives any other outside agency services? No   Equipment Currently Used at Home none   Do you have any problems affording any of your prescribed medications? No   Is the patient taking medications as prescribed? yes   Does the patient have transportation home? Yes   Transportation Anticipated family or friend will provide   Does the patient receive services at the Coumadin Clinic? No   Discharge Plan A Home with family   Discharge Plan B Home with family   DME Needed Upon Discharge  none   Patient/Family in Agreement with Plan yes

## 2020-08-25 NOTE — SUBJECTIVE & OBJECTIVE
Past Medical History:   Diagnosis Date    Allergy     Asthma     GERD (gastroesophageal reflux disease)     Hyperlipidemia     Hypertension     Insomnia        Past Surgical History:   Procedure Laterality Date    FRACTURE SURGERY      HERNIA REPAIR      ORBITAL FRACTURE SURGERY      left    VASECTOMY         Review of patient's allergies indicates:   Allergen Reactions    Penicillins Anaphylaxis     Other reaction(s): Hives  Other reaction(s): Difficulty breathing       No current facility-administered medications on file prior to encounter.      Current Outpatient Medications on File Prior to Encounter   Medication Sig    albuterol (PROVENTIL/VENTOLIN HFA) 90 mcg/actuation inhaler Inhale 2 puffs into the lungs every 6 (six) hours as needed for Wheezing or Shortness of Breath.    azithromycin (Z-CHET) 250 MG tablet Take 2 tablets by mouth x 1 for day 1 Then take 1 tablet by mouth daily for day 2 - 5    levocetirizine (XYZAL) 5 MG tablet Take 1 tablet (5 mg total) by mouth every evening.    omeprazole (PRILOSEC) 40 MG capsule TAKE 1 CAPSULE BY MOUTH EVERY DAY IN THE MORNING    rosuvastatin (CRESTOR) 10 MG tablet Take 1 tablet (10 mg total) by mouth once daily.    traZODone (DESYREL) 100 MG tablet Take 1 tablet (100 mg total) by mouth nightly as needed for Insomnia.    albuterol (PROVENTIL) 2.5 mg /3 mL (0.083 %) nebulizer solution Take 3 mLs (2.5 mg total) by nebulization every 6 (six) hours as needed for Wheezing. Rescue    HYDROcodone-acetaminophen (NORCO)  mg per tablet      Family History     Problem Relation (Age of Onset)    Cancer Paternal Grandmother, Paternal Grandfather    Diabetes Paternal Grandmother    Heart disease Mother        Tobacco Use    Smoking status: Former Smoker     Packs/day: 1.00     Years: 15.00     Pack years: 15.00    Smokeless tobacco: Never Used    Tobacco comment: trying to quit   Substance and Sexual Activity    Alcohol use: Yes     Alcohol/week: 1.0  standard drinks     Types: 1 Shots of liquor per week    Drug use: No    Sexual activity: Not on file     Review of Systems   Cardiovascular: Positive for leg swelling and orthopnea. Negative for chest pain and syncope.   Respiratory: Positive for shortness of breath. Negative for wheezing.    Gastrointestinal: Negative for nausea and vomiting.   Neurological: Negative for light-headedness.     Objective:     Vital Signs (Most Recent):  Temp: 97.2 °F (36.2 °C) (08/25/20 0804)  Pulse: (!) 114 (08/25/20 1147)  Resp: 19 (08/25/20 1053)  BP: (!) 135/91 (08/25/20 1053)  SpO2: (!) 92 % (08/25/20 1053) Vital Signs (24h Range):  Temp:  [97.2 °F (36.2 °C)-97.8 °F (36.6 °C)] 97.2 °F (36.2 °C)  Pulse:  [] 114  Resp:  [17-20] 19  SpO2:  [92 %-96 %] 92 %  BP: (128-160)/(78-94) 135/91     Weight: 109.2 kg (240 lb 11.9 oz)  Body mass index is 33.59 kg/m².    SpO2: (!) 92 %  O2 Device (Oxygen Therapy): room air      Intake/Output Summary (Last 24 hours) at 8/25/2020 1451  Last data filed at 8/25/2020 1200  Gross per 24 hour   Intake 1170 ml   Output 5375 ml   Net -4205 ml       Lines/Drains/Airways     Peripheral Intravenous Line                 Peripheral IV - Single Lumen 08/24/20 1536 20 G Left Antecubital less than 1 day                Physical Exam   Constitutional: He is oriented to person, place, and time. He appears well-developed. No distress.   HENT:   Head: Normocephalic and atraumatic.   Neck: JVD present.   Cardiovascular: Normal rate, regular rhythm, normal heart sounds and intact distal pulses. Exam reveals no gallop and no friction rub.   No murmur heard.  Pulmonary/Chest: Effort normal. No respiratory distress. He has no wheezes. He has no rales.   Minimal distress   Musculoskeletal:         General: Edema (Up to knees) present.   Neurological: He is oriented to person, place, and time.   Skin: Skin is warm and dry. He is not diaphoretic.   Nursing note and vitals reviewed.      Significant Labs:   BMP:    Recent Labs   Lab 08/24/20  1349 08/24/20  1836 08/25/20  0438     --  73     --  141   K 4.4  --  4.1     --  106   CO2 24  --  24   BUN 8  --  9   CREATININE 1.1  --  0.9   CALCIUM 9.3  --  9.4   MG 1.6 1.4*  --    , CMP   Recent Labs   Lab 08/24/20  1349 08/25/20  0438    141   K 4.4 4.1    106   CO2 24 24    73   BUN 8 9   CREATININE 1.1 0.9   CALCIUM 9.3 9.4   PROT 6.2 6.0   ALBUMIN 3.5 3.4*   BILITOT 1.3* 1.6*   ALKPHOS 58 52*   * 100*   * 93*   ANIONGAP 9 11   ESTGFRAFRICA >60.0 >60.0   EGFRNONAA >60.0 >60.0   , CBC   Recent Labs   Lab 08/24/20  1349   WBC 12.43   HGB 14.7   HCT 48.3      , Lipid Panel   Recent Labs   Lab 08/25/20  0438   CHOL 114*   HDL 20*   LDLCALC 74.6   TRIG 97   CHOLHDL 17.5*   , Troponin   Recent Labs   Lab 08/24/20  1349 08/24/20  2033 08/25/20  0438   TROPONINI 0.023 0.029* 0.042*    and All pertinent lab results from the last 24 hours have been reviewed.    Significant Imaging: Echocardiogram:   Transthoracic echo (TTE) complete (Cupid Only):   Results for orders placed or performed during the hospital encounter of 08/24/20   Echo Color Flow Doppler? Yes   Result Value Ref Range    Ascending aorta 4.01 cm    STJ 3.75 cm    AV mean gradient 1 mmHg    Ao peak john 0.72 m/s    Ao VTI 9.26 cm    IVS 0.89 0.6 - 1.1 cm    Left Atrium Major Axis 4.63 cm    Left Atrium Minor Axis 5.66 cm    LVIDD 6.79 (A) 3.5 - 6.0 cm    LVIDS 6.41 (A) 2.1 - 4.0 cm    LVOT diameter 2.44 cm    LVOT peak VTI 11.06 cm    PW 1.23 (A) 0.6 - 1.1 cm    MV Peak A John 0.26 m/s    E wave decelartion time 154.52 msec    MV Peak E John 0.97 m/s    RA Major Axis 5.30 cm    RA Width 5.04 cm    RVDD 5.31 cm    Sinus 3.85 cm    TAPSE 1.55 cm    TDI LATERAL 0.04 m/s    TDI SEPTAL 0.04 m/s    LA WIDTH 4.21 cm    MV stenosis pressure 1/2 time 44.81 ms    LV Diastolic Volume 238.26 mL    LV Systolic Volume 209.43 mL    RV S' 8.08 cm/s    LVOT peak john 0.72 m/s    LV  LATERAL E/E' RATIO 24.25 m/s    LV SEPTAL E/E' RATIO 24.25 m/s    FS 6 %    LV mass 328.66 g    Left Ventricle Relative Wall Thickness 0.36 cm    AV valve area 5.58 cm2    AV Velocity Ratio 1.00     AV index (prosthetic) 1.19     MV valve area p 1/2 method 4.91 cm2    E/A ratio 3.73     Mean e' 0.04 m/s    LVOT area 4.7 cm2    LVOT stroke volume 51.69 cm3    AV peak gradient 2 mmHg    E/E' ratio 24.25 m/s    LV Systolic Volume Index 91.8 mL/m2    LV Diastolic Volume Index 104.44 mL/m2    LV Mass Index 144 g/m2    BSA 2.34 m2    Right Atrial Pressure (from IVC) 15 mmHg    Narrative    · Eccentric left ventricular hypertrophy.  · Severely decreased left ventricular systolic function. The estimated   ejection fraction is 10%.  · Severe left ventricular enlargement.  · Grade III (severe) left ventricular diastolic dysfunction consistent   with restrictive physiology.  · No wall motion abnormalities.  · Moderately to severely reduced right ventricular systolic function.  · Moderate right ventricular enlargement.  · Mild mitral regurgitation.  · Mild tricuspid regurgitation.  · Elevated central venous pressure (15 mmHg).        EKG:    Vent. Rate : 110 BPM  Sinus tachycardia   Possible Left atrial enlargement   Nonspecific T wave abnormality   Abnormal ECG   When compared with ECG of 24-AUG-2020 13:25,   No significant change was found     X-Ray: CXR:    EXAMINATION:  XR CHEST AP PORTABLE     CLINICAL HISTORY:  CHF;     TECHNIQUE:  Single frontal view of the chest was performed.     COMPARISON:  August 15, 2020.     FINDINGS:  Monitoring leads are in place.  Heart is enlarged.  Mild increase in the pulmonary vascular and interstitial markings.  No confluent consolidation.     Impression:     Findings most consistent with mild degree of congestive failure.

## 2020-08-25 NOTE — PLAN OF CARE
Plan of care review with patient. Verbalized understanding. Patient rested well throughout night. Telemetry remain in progress Sinus tach noted. SOB Noted on exertion. Patient reminded of 1500ml fluid restriction, verbalized understanding. C/o headache relieved by prn pain med. See Mar. Patient is ambulatory and is continent x2. Call light and personal belonging in reach. All safety measures maintained

## 2020-08-25 NOTE — NURSING
Dr. Polanco notified that pt had 6 beat run of Vtach per telemetry tech. Pt is asymptomatic upon assessment.

## 2020-08-25 NOTE — ASSESSMENT & PLAN NOTE
Advised to report to ER- patient agrees   Called charge nurse in ER- report given.   Patient taken via WC to ER

## 2020-08-25 NOTE — H&P
Hospital Medicine  History and Physical  [facility]      Patient Name: Dev Sweeney  MRN:  [id number]  Hospital Medicine Team: [facility] MED D Aisha Lundberg MD  Date of Admission:  8/24/2020     Principal Problem:  New onset of congestive heart failure   Primary Care Physician: Loki Alba DO       History of Present Illness:    Mr. Dev Sweeney is a 45 y.o. male who presents to the ER for evaluation of shortness of breath and leg swelling.  He mentions that since July, he has been having worsening shortness of breath, cough, and leg swelling.  He went to an Urgent Care twice since that time where he was given Azithromycin and Prednisone.  Despite this, his breathing didn't improve.  He endorses shortness of breath at rest and with exertion.  He is unable to sleep and lie flat, so has been attempting to sleep in a chair.  He endorses BLE pitting edema as well as about a 20lb weight gain.  He went to see a Pulmonologist on the day of admission, who sent him to the ER for further evaluation.  Of note, he quit smoking 1 month prior to admission and uses testosterone weekly for recreational purposes.  He denies any chest pain, fevers or chills.    Upon arrival to the ER, vitals were temp 98.1F,  and /87 satting 94% on room air.  Labs showed  and elevated AST/ALT and bilirubin.  CXR showed CHF and CTA showed possibly opacities, likely artifact.  He was given Lasix 20mg IV and was admitted to Hospital Medicine for further management.        Review of Systems:  Constitutional: Negative for chills, fever, fatigue, weakness  HENT: Negative for sore throat, trouble swallowing.    Eyes: Negative for photophobia, visual disturbance.   Respiratory: + cough, shortness of breath.    Cardiovascular: + leg swelling.   Gastrointestinal: Negative for abdominal pain, nausea, vomiting, diarrhea, constipation  Endocrine: Negative for cold intolerance, heat intolerance.    Genitourinary: Negative for dysuria, frequency.   Musculoskeletal: Negative for arthralgias, myalgias.   Skin: Negative for rash, wound, erythema   Neurological: Negative for numbness, paresthesias  Psychiatric/Behavioral: Negative for confusion, hallucinations, anxiety  All other systems reviewed and are negative.      Past Medical History: Patient has a past medical history of Allergy, Asthma, GERD (gastroesophageal reflux disease), Hyperlipidemia, Hypertension, and Insomnia.      Past Surgical History: Patient has a past surgical history that includes Fracture surgery; Hernia repair; Orbital fracture repair; and Vasectomy.      Social History: Patient reports that he has quit smoking. He has a 15.00 pack-year smoking history. He has never used smokeless tobacco. He reports current alcohol use of about 1.0 standard drinks of alcohol per week. He reports that he does not use drugs.      Family History: Patient's family history includes Cancer in his paternal grandfather and paternal grandmother; Diabetes in his paternal grandmother; Heart disease in his mother.      Medications: Scheduled Meds:   [START ON 8/25/2020] furosemide (LASIX) IV  20 mg Intravenous BID    [START ON 8/25/2020] pantoprazole  40 mg Oral Daily    [START ON 8/25/2020] rosuvastatin  10 mg Oral Daily     Continuous Infusions:  PRN Meds:.acetaminophen, albuterol, HYDROcodone-acetaminophen, HYDROcodone-acetaminophen, melatonin, ondansetron, promethazine (PHENERGAN) IVPB, senna-docusate 8.6-50 mg, sodium chloride 0.9%, sodium chloride 0.9%, traZODone      Allergies: Patient is allergic to penicillins.      Physical Exam:    Temp:  [98.1 °F (36.7 °C)]   Pulse:  [110-115]   Resp:  [20]   BP: (126-160)/(78-94)   SpO2:  [94 %-98 %]     Constitutional: Appears well developed and well nourished  Head: Normocephalic and atraumatic.   Mouth/Throat: Oropharynx is clear and moist.   Eyes: EOM are normal. Pupils are equal, round, and reactive to light. No  scleral icterus.   Neck: Normal range of motion. Neck supple.   Cardiovascular: Normal heart rate.  Regular heart rhythm.  No murmur heard.  Pulmonary/Chest: Effort normal. No respiratory distress. No wheezes, rales, or rhonchi  Abdominal: Soft. Bowel sounds are normal.  No distension.  No tenderness  Musculoskeletal: Normal range of motion. 2+ BLE edema  Neurological: Alert and oriented to person, place, and time.   Skin: Skin is warm and dry. Multiple tattoos  Psychiatric: Normal mood and affect. Behavior is normal.         Intake/Output Summary (Last 24 hours) at 8/24/2020 2016  Last data filed at 8/24/2020 1900  Gross per 24 hour   Intake --   Output 2500 ml   Net -2500 ml     Recent Labs   Lab 08/24/20  1349   WBC 12.43   HGB 14.7   HCT 48.3        Recent Labs   Lab 08/24/20  1349 08/24/20  1836     --    K 4.4  --      --    CO2 24  --    BUN 8  --    CREATININE 1.1  --      --    CALCIUM 9.3  --    MG  --  1.4*     Recent Labs   Lab 08/24/20  1349   ALKPHOS 58   *   *   ALBUMIN 3.5   PROT 6.2   BILITOT 1.3*   INR 1.1      No results for input(s): LACTATE in the last 72 hours.   Recent Labs     08/24/20  1349   TROPONINI 0.023         Assessment and Plan:    Mr. Dev Sweeney is a 45 y.o. male who presented to [facility] on 8/24/2020 with new onset CHF.    New Onset CHF  · Source unclear - possibly from recreational testosterone?  · CHF Pathway initiated  · Check 2D echo  ·  and CXR with pulmonary edema  · IV diuresis with Lasix 20mg IV BID as naive and monitor response.  Goal diuresis 2-3L/day.  · Strict I&Os with daily weights.  Dry weight 220.  Admit weight 243.  · Will need BB and possibly ARB on DC    Hepatopathy  · Elevated AST, ALT and Bili likely 2/2 hepatic congestion for CHF and volume overload, but possibly from Crestor?  · IV diuresis and monitor as above - as likely to improve  · Can consider RUQ US and further evaluation pending  trend    HLD  · Chronic and stable  · Continue Crestor for now - If LFTs dont improve can consider stopping    GERD  · Chronic and stable  · Continue PPI    Recreational Use of Testosterone  · Weekly for 3 years    Obesity  Body mass index is 33.98 kg/m².  Encourage diet, weight loss, exercise     Diet:  Low Sodium  VTE PPx:  Ambulatory  Goals of Care:  Full      Disposition:  Pending diuresis  Discharge Needs:  Cardiology outpatient      Aisha Lundberg MD  [facility]  Medical Director, [facility]  Cell:  [phone]  Spectra:  [phone]

## 2020-08-25 NOTE — PROGRESS NOTES
Hospital Medicine  Progress Note      Patient Name: Dev Sweeney  MRN:  [id number]  [facility] Medicine Team: [facility] D Graham Polanco MD  Date of Admission:  8/24/2020     Length of Stay:  LOS: 0 days   Expected Discharge Date:   Principal Problem:  New onset of congestive heart failure      Subjective:    Interval History/Overnight Events:  Patient admitted overnight with new onset CHF, 2D Echo today revealed EF of 10%. Cardiology consulted for ICD vs lifevest and ischemic workup while inpatient. Continuing diuresis with IV lasix with great urine output and improvement in subjective SOB as well as lower extremity edema.       Review of Systems:  Constitutional: Negative for chills, fatigue, fever.   Respiratory: Negative for cough, +shortness of breath.    Cardiovascular: Negative for chest pain, palpitations, +leg swelling.   Gastrointestinal: Negative for abdominal pain, nausea, vomiting, diarrhea, constipation  Genitourinary: Negative for dysuria, frequency.   All other systems reviewed and are negative.      Objective:    Physical Exam:  Temp:  [97.2 °F (36.2 °C)-98.1 °F (36.7 °C)]   Pulse:  []   Resp:  [17-20]   BP: (126-160)/(78-94)   SpO2:  [92 %-98 %]     Constitutional: Appears comfortable, in no acute distress   Eyes: No scleral icterus or eye discharge  Cardiovascular: Normal heart rate.  Regular heart rhythm.  No murmur heard.  Pulmonary/Chest: Effort normal and breath sounds normal. No respiratory distress. No wheezes, rales, or rhonchi  Abdominal: Soft. Bowel sounds are normal.  No distension.  No tenderness  Musculoskeletal: No deformities.  2+ pitting edema BLE  Neurological: Alert.  Oriented to person, place, and time.   Skin: Skin is warm and dry.       Assessment and Plan:  Mr. Dev Sweeney is a 45 y.o. male who presented to [facility] on 8/24/2020 with new onset CHF.     New Onset CHF  · Source unclear - likely NICM  · CHF Pathway initiated  ·  and CXR with pulmonary  edema  · IV diuresis with Lasix 20mg IV BID as naive and monitor response.  Goal diuresis 2-3L/day.  · Strict I&Os with daily weights.  Dry weight 220.  Admit weight 243.  · Will need BB and entresto on DC  · 2D Echo shows EF of 10%, cardiology consulted as patient will likely need ICD vs lifevest and ischemic workup while inpatient      Hepatopathy  · Elevated AST, ALT and Bili likely 2/2 hepatic congestion for CHF and volume overload  · Improving with diuresis      HLD  · Chronic and stable  · Continue Crestor      GERD  · Chronic and stable  · Continue PPI     Recreational Use of Testosterone  · Weekly for 3 years     Obesity  · Body mass index is 33.98 kg/m².  · Encourage diet, weight loss, exercise     Diet:  Low Sodium, 1500 mL fluid restriction  VTE PPx:  Ambulatory  Goals of Care:  Full        Disposition:  Pending diuresis, cardiology evaluation inpatient  Discharge Needs:  Cardiology outpatient        Graham Polanco MD  [facility] Medicine  Spectra:  [phone]  Pager: [phone]

## 2020-08-25 NOTE — NURSING
Patient arrived on unit. Admitted to room 746. Oriented to room and call light.  SOB noted on exertion. Sats 96%. Telemetry in progress. Sinus tach. Denies pain. No acute distress noted. Call light in reach.

## 2020-08-25 NOTE — ASSESSMENT & PLAN NOTE
Patient is a 45 year old male with no significant PMHx who presents with 1 month of worsening SOB, orthopnea, lower extremity swelling. Has been unable to lie flat to sleep, sitting almost upright in a chair at night. Patient denies any chest pain. Reports seeing PCP recently for SOB was prescribed azithromycin and has been taking that. Has been taking crestor 10 mg for cholesterol. Patient reports taking testosterone weekly for recreational purposes. Endorses heavy drinking periodically, as much as a bottle of whiskey in a day on some days. Quit tobacco 1 month ago, 20 pack-year history.    New onset HF with EF of 10% of unknown cause. Likely NICM 2/2 to EtOH, less likely CAD.  Discussed with patient the potential role of EtOH and benefits of abstaining  Patient verbalizes understanding and is willing to quit  - TTE demonstrates: EF 10%, eccentric LVH, LVIDD 6.8 cm, GIII LVDD, mod-to-sev RV dysfunction, CVP 15  - EKG: Sinus tachycardia with possible left atrial enlargement and nonspecific T waves changes  - Troponin 0.023 -> 0.029 -> 0.042  -     -- Fluid restriction at 1500 cc with strict I/Os and daily STANDING weights  -- Continue diuresis with 2-3 L goal, good UOP  -- Recommend afternoon BMP  -- Maintain on telemetry and daily EKGs   -- Up to date risk stratification: TSH, Lipids, HbA1c with optimization of risk factors is necessary  -- Check Electrolytes, keep Mg >2 and K >4  -- Ambulate as tolerated  -- Optimal therapy at this time to include:   -- Continue crestor   -- Start Coreg 6.25 mg BID     -- Will reevaluate in 1-2 days for initiation of ARB or entresto   -- Would like to monitor before starting additional therapy as patient is naive to these medications  -- Plans for outpatient will include stress test to rule out ischemic causes   -- Heart failure clinic follow up    -- Follow up transitional care visit for heart failure at discharge   -- Discharge with event monitor (for development of A.Fib  with EtOH use)

## 2020-08-26 LAB
ABO + RH BLD: NORMAL
ALBUMIN SERPL BCP-MCNC: 3.2 G/DL (ref 3.5–5.2)
ALP SERPL-CCNC: 51 U/L (ref 55–135)
ALT SERPL W/O P-5'-P-CCNC: 74 U/L (ref 10–44)
ANION GAP SERPL CALC-SCNC: 9 MMOL/L (ref 8–16)
AST SERPL-CCNC: 64 U/L (ref 10–40)
BASOPHILS # BLD AUTO: 0.14 K/UL (ref 0–0.2)
BASOPHILS NFR BLD: 1.1 % (ref 0–1.9)
BILIRUB SERPL-MCNC: 1.8 MG/DL (ref 0.1–1)
BLD GP AB SCN CELLS X3 SERPL QL: NORMAL
BUN SERPL-MCNC: 11 MG/DL (ref 6–20)
CALCIUM SERPL-MCNC: 9.3 MG/DL (ref 8.7–10.5)
CATH EF QUANTITATIVE: 15 %
CHLORIDE SERPL-SCNC: 105 MMOL/L (ref 95–110)
CO2 SERPL-SCNC: 24 MMOL/L (ref 23–29)
CREAT SERPL-MCNC: 0.9 MG/DL (ref 0.5–1.4)
DIFFERENTIAL METHOD: ABNORMAL
EOSINOPHIL # BLD AUTO: 0.2 K/UL (ref 0–0.5)
EOSINOPHIL NFR BLD: 1.9 % (ref 0–8)
ERYTHROCYTE [DISTWIDTH] IN BLOOD BY AUTOMATED COUNT: 17.5 % (ref 11.5–14.5)
EST. GFR  (AFRICAN AMERICAN): >60 ML/MIN/1.73 M^2
EST. GFR  (NON AFRICAN AMERICAN): >60 ML/MIN/1.73 M^2
GLUCOSE SERPL-MCNC: 73 MG/DL (ref 70–110)
HCT VFR BLD AUTO: 51.8 % (ref 40–54)
HGB BLD-MCNC: 15.9 G/DL (ref 14–18)
IMM GRANULOCYTES # BLD AUTO: 0.06 K/UL (ref 0–0.04)
IMM GRANULOCYTES NFR BLD AUTO: 0.5 % (ref 0–0.5)
LYMPHOCYTES # BLD AUTO: 1.8 K/UL (ref 1–4.8)
LYMPHOCYTES NFR BLD: 14.4 % (ref 18–48)
MAGNESIUM SERPL-MCNC: 1.5 MG/DL (ref 1.6–2.6)
MCH RBC QN AUTO: 27.2 PG (ref 27–31)
MCHC RBC AUTO-ENTMCNC: 30.7 G/DL (ref 32–36)
MCV RBC AUTO: 89 FL (ref 82–98)
MONOCYTES # BLD AUTO: 1.2 K/UL (ref 0.3–1)
MONOCYTES NFR BLD: 9.6 % (ref 4–15)
NEUTROPHILS # BLD AUTO: 8.9 K/UL (ref 1.8–7.7)
NEUTROPHILS NFR BLD: 72.5 % (ref 38–73)
NRBC BLD-RTO: 0 /100 WBC
PLATELET # BLD AUTO: 255 K/UL (ref 150–350)
PMV BLD AUTO: 10 FL (ref 9.2–12.9)
POTASSIUM SERPL-SCNC: 4.4 MMOL/L (ref 3.5–5.1)
PROT SERPL-MCNC: 5.9 G/DL (ref 6–8.4)
RBC # BLD AUTO: 5.84 M/UL (ref 4.6–6.2)
SARS-COV-2 RDRP RESP QL NAA+PROBE: NEGATIVE
SODIUM SERPL-SCNC: 138 MMOL/L (ref 136–145)
WBC # BLD AUTO: 12.25 K/UL (ref 3.9–12.7)

## 2020-08-26 PROCEDURE — 25000003 PHARM REV CODE 250: Performed by: STUDENT IN AN ORGANIZED HEALTH CARE EDUCATION/TRAINING PROGRAM

## 2020-08-26 PROCEDURE — 63600175 PHARM REV CODE 636 W HCPCS: Performed by: HOSPITALIST

## 2020-08-26 PROCEDURE — 63600175 PHARM REV CODE 636 W HCPCS: Performed by: STUDENT IN AN ORGANIZED HEALTH CARE EDUCATION/TRAINING PROGRAM

## 2020-08-26 PROCEDURE — 99152 PR MOD CONSCIOUS SEDATION, SAME PHYS, 5+ YRS, FIRST 15 MIN: ICD-10-PCS | Mod: ,,, | Performed by: INTERNAL MEDICINE

## 2020-08-26 PROCEDURE — 99226 PR SUBSEQUENT OBSERVATION CARE,LEVEL III: CPT | Mod: ,,, | Performed by: STUDENT IN AN ORGANIZED HEALTH CARE EDUCATION/TRAINING PROGRAM

## 2020-08-26 PROCEDURE — 97802 MEDICAL NUTRITION INDIV IN: CPT

## 2020-08-26 PROCEDURE — C1769 GUIDE WIRE: HCPCS | Performed by: INTERNAL MEDICINE

## 2020-08-26 PROCEDURE — 96376 TX/PRO/DX INJ SAME DRUG ADON: CPT

## 2020-08-26 PROCEDURE — G0378 HOSPITAL OBSERVATION PER HR: HCPCS

## 2020-08-26 PROCEDURE — 83735 ASSAY OF MAGNESIUM: CPT

## 2020-08-26 PROCEDURE — 25000003 PHARM REV CODE 250: Performed by: HOSPITALIST

## 2020-08-26 PROCEDURE — 93458 L HRT ARTERY/VENTRICLE ANGIO: CPT | Performed by: INTERNAL MEDICINE

## 2020-08-26 PROCEDURE — 85025 COMPLETE CBC W/AUTO DIFF WBC: CPT

## 2020-08-26 PROCEDURE — 99153 MOD SED SAME PHYS/QHP EA: CPT | Performed by: INTERNAL MEDICINE

## 2020-08-26 PROCEDURE — 86850 RBC ANTIBODY SCREEN: CPT

## 2020-08-26 PROCEDURE — 93458 PR CATH PLACE/CORON ANGIO, IMG SUPER/INTERP,W LEFT HEART VENTRICULOGRAPHY: ICD-10-PCS | Mod: 26,,, | Performed by: INTERNAL MEDICINE

## 2020-08-26 PROCEDURE — C1887 CATHETER, GUIDING: HCPCS | Performed by: INTERNAL MEDICINE

## 2020-08-26 PROCEDURE — 99152 MOD SED SAME PHYS/QHP 5/>YRS: CPT | Mod: ,,, | Performed by: INTERNAL MEDICINE

## 2020-08-26 PROCEDURE — C1894 INTRO/SHEATH, NON-LASER: HCPCS | Performed by: INTERNAL MEDICINE

## 2020-08-26 PROCEDURE — 99152 MOD SED SAME PHYS/QHP 5/>YRS: CPT | Performed by: INTERNAL MEDICINE

## 2020-08-26 PROCEDURE — 93458 L HRT ARTERY/VENTRICLE ANGIO: CPT | Mod: 26,,, | Performed by: INTERNAL MEDICINE

## 2020-08-26 PROCEDURE — 25000003 PHARM REV CODE 250: Performed by: INTERNAL MEDICINE

## 2020-08-26 PROCEDURE — U0002 COVID-19 LAB TEST NON-CDC: HCPCS

## 2020-08-26 PROCEDURE — 80053 COMPREHEN METABOLIC PANEL: CPT

## 2020-08-26 PROCEDURE — 25500020 PHARM REV CODE 255: Performed by: INTERNAL MEDICINE

## 2020-08-26 PROCEDURE — 99226 PR SUBSEQUENT OBSERVATION CARE,LEVEL III: ICD-10-PCS | Mod: ,,, | Performed by: STUDENT IN AN ORGANIZED HEALTH CARE EDUCATION/TRAINING PROGRAM

## 2020-08-26 PROCEDURE — 36415 COLL VENOUS BLD VENIPUNCTURE: CPT

## 2020-08-26 PROCEDURE — 63600175 PHARM REV CODE 636 W HCPCS: Performed by: INTERNAL MEDICINE

## 2020-08-26 RX ORDER — HEPARIN SODIUM 200 [USP'U]/100ML
INJECTION, SOLUTION INTRAVENOUS
Status: DISCONTINUED | OUTPATIENT
Start: 2020-08-26 | End: 2020-08-27 | Stop reason: HOSPADM

## 2020-08-26 RX ORDER — NAPROXEN SODIUM 220 MG/1
81 TABLET, FILM COATED ORAL DAILY
Status: DISCONTINUED | OUTPATIENT
Start: 2020-08-27 | End: 2020-08-27 | Stop reason: HOSPADM

## 2020-08-26 RX ORDER — SODIUM CHLORIDE 9 MG/ML
3 INJECTION, SOLUTION INTRAVENOUS CONTINUOUS
Status: DISCONTINUED | OUTPATIENT
Start: 2020-08-26 | End: 2020-08-26

## 2020-08-26 RX ORDER — FENTANYL CITRATE 50 UG/ML
INJECTION, SOLUTION INTRAMUSCULAR; INTRAVENOUS
Status: DISCONTINUED | OUTPATIENT
Start: 2020-08-26 | End: 2020-08-26 | Stop reason: HOSPADM

## 2020-08-26 RX ORDER — FUROSEMIDE 40 MG/1
40 TABLET ORAL DAILY
Status: DISCONTINUED | OUTPATIENT
Start: 2020-08-26 | End: 2020-08-27 | Stop reason: HOSPADM

## 2020-08-26 RX ORDER — SODIUM CHLORIDE 9 MG/ML
INJECTION, SOLUTION INTRAVENOUS CONTINUOUS
Status: DISCONTINUED | OUTPATIENT
Start: 2020-08-26 | End: 2020-08-26

## 2020-08-26 RX ORDER — DIPHENHYDRAMINE HCL 50 MG
50 CAPSULE ORAL ONCE
Status: COMPLETED | OUTPATIENT
Start: 2020-08-26 | End: 2020-08-26

## 2020-08-26 RX ORDER — SODIUM CHLORIDE 9 MG/ML
INJECTION, SOLUTION INTRAVENOUS
Status: DISCONTINUED | OUTPATIENT
Start: 2020-08-26 | End: 2020-08-27 | Stop reason: HOSPADM

## 2020-08-26 RX ORDER — FUROSEMIDE 40 MG/1
40 TABLET ORAL 2 TIMES DAILY
Status: DISCONTINUED | OUTPATIENT
Start: 2020-08-26 | End: 2020-08-26

## 2020-08-26 RX ORDER — HEPARIN SODIUM 1000 [USP'U]/ML
INJECTION, SOLUTION INTRAVENOUS; SUBCUTANEOUS
Status: DISCONTINUED | OUTPATIENT
Start: 2020-08-26 | End: 2020-08-26 | Stop reason: HOSPADM

## 2020-08-26 RX ORDER — CARVEDILOL 6.25 MG/1
6.25 TABLET ORAL 2 TIMES DAILY
Status: DISCONTINUED | OUTPATIENT
Start: 2020-08-27 | End: 2020-08-27 | Stop reason: HOSPADM

## 2020-08-26 RX ORDER — LIDOCAINE HYDROCHLORIDE 20 MG/ML
INJECTION, SOLUTION INFILTRATION; PERINEURAL
Status: DISCONTINUED | OUTPATIENT
Start: 2020-08-26 | End: 2020-08-26 | Stop reason: HOSPADM

## 2020-08-26 RX ORDER — MIDAZOLAM HYDROCHLORIDE 1 MG/ML
INJECTION, SOLUTION INTRAMUSCULAR; INTRAVENOUS
Status: DISCONTINUED | OUTPATIENT
Start: 2020-08-26 | End: 2020-08-26 | Stop reason: HOSPADM

## 2020-08-26 RX ORDER — NITROGLYCERIN 5 MG/ML
INJECTION, SOLUTION INTRAVENOUS
Status: DISCONTINUED | OUTPATIENT
Start: 2020-08-26 | End: 2020-08-26 | Stop reason: HOSPADM

## 2020-08-26 RX ADMIN — HYDROCODONE BITARTRATE AND ACETAMINOPHEN 1 TABLET: 10; 325 TABLET ORAL at 06:08

## 2020-08-26 RX ADMIN — SODIUM CHLORIDE: 0.9 INJECTION, SOLUTION INTRAVENOUS at 02:08

## 2020-08-26 RX ADMIN — PANTOPRAZOLE SODIUM 40 MG: 40 TABLET, DELAYED RELEASE ORAL at 08:08

## 2020-08-26 RX ADMIN — MAGNESIUM SULFATE HEPTAHYDRATE 3 G: 500 INJECTION, SOLUTION INTRAMUSCULAR; INTRAVENOUS at 08:08

## 2020-08-26 RX ADMIN — FUROSEMIDE 40 MG: 40 TABLET ORAL at 08:08

## 2020-08-26 RX ADMIN — ROSUVASTATIN CALCIUM 10 MG: 10 TABLET, FILM COATED ORAL at 08:08

## 2020-08-26 RX ADMIN — DIPHENHYDRAMINE HYDROCHLORIDE 50 MG: 50 CAPSULE ORAL at 11:08

## 2020-08-26 RX ADMIN — MELATONIN TAB 3 MG 6 MG: 3 TAB at 10:08

## 2020-08-26 RX ADMIN — FUROSEMIDE 20 MG: 10 INJECTION, SOLUTION INTRAMUSCULAR; INTRAVENOUS at 08:08

## 2020-08-26 RX ADMIN — ACETAMINOPHEN 650 MG: 325 TABLET ORAL at 10:08

## 2020-08-26 NOTE — PLAN OF CARE
Pt free of injuries/falls/trauma. VSS on room air.   No complaints of pain or discomfort  Being diuresed with 20 mg lasix IV BID  Plan of care reviewed with pt, understanding verbalized. No questions at this time.   Bed in low position, wheels locked, call light in reach.  Will continue to monitor.

## 2020-08-26 NOTE — PROGRESS NOTES
Hospital Medicine  Progress Note      Patient Name: Dev Sweeney  MRN:  [id number]  [facility] Medicine [facility]: [facility] D Graham Polanco MD  Date of Admission:  8/24/2020     Length of Stay:  LOS: 0 days   Expected Discharge Date: 8/27/2020  Principal Problem:  New onset of congestive heart failure      Subjective:    Interval History/Overnight Events:  Excellent urine output with IV lasix, will transition to oral starting tonight. IC planning for [facility] today. Patient subjectively feeling better with improvement in JOSEPH and lower extremity edema. Will hold off on BB as patient is not euvolemic yet.      Review of Systems:  Constitutional: Negative for chills, fatigue, fever.   Respiratory: Negative for cough, +shortness of breath.    Cardiovascular: Negative for chest pain, palpitations, +leg swelling.   Gastrointestinal: Negative for abdominal pain, nausea, vomiting, diarrhea, constipation  Genitourinary: Negative for dysuria, frequency.   All other systems reviewed and are negative.      Objective:    Physical Exam:  Temp:  [97 °F (36.1 °C)-98.2 °F (36.8 °C)]   Pulse:  []   Resp:  [16-22]   BP: (135-142)/(80-96)   SpO2:  [92 %-98 %]     Constitutional: Appears comfortable, in no acute distress   Eyes: No scleral icterus or eye discharge  Cardiovascular: Tachycardic.  Regular heart rhythm.  No murmur heard.  Pulmonary/Chest: Effort normal and breath sounds normal. No respiratory distress. No wheezes, rales, or rhonchi  Abdominal: Soft. Bowel sounds are normal.  No distension.  No tenderness  Musculoskeletal: No deformities.  1+ pitting edema BLE  Neurological: Alert.  Oriented to person, place, and time.   Skin: Skin is warm and dry.       Assessment and Plan:  Mr. Dev Sweeney is a 45 y.o. male who presented to [facility] on 8/24/2020 with new onset CHF. 2D Echo revealed EF of 10%. Cardiology consulted for ICD vs lifevest and ischemic workup while inpatient. IV lasix  Transitioned to PO lasix on  8/26/20 and interventional cardiology consulted for [facility] as unable to proceed with NM stress test.      New Onset CHF  · Source unclear   · CHF Pathway initiated  ·  and CXR with pulmonary edema  · IV diuresis with Lasix 20mg IV BID as naive and monitor response.  Goal diuresis 2-3L/day.  · Strict I&Os with daily weights.  Dry weight 220.  Admit weight 243.  · Will need BB and entresto on DC  · 2D Echo shows EF of 10%, cardiology consulted as patient will likely need ICD vs lifevest and ischemic workup while inpatient   · IV lasix transitioned to lasix 40 PO BID (first dose tonight), so far has had excellent urine output  · IC consulted for [facility], to be performed today     Hepatopathy  · Elevated AST, ALT and Bili likely 2/2 hepatic congestion for CHF and volume overload  · Improving with diuresis      HLD  · Chronic and stable  · Continue Crestor      GERD  · Chronic and stable  · Continue PPI     Recreational Use of Testosterone  · Weekly for 3 years     Obesity  · Body mass index is 33.98 kg/m².  · Encourage diet, weight loss, exercise     Diet:  Low Sodium, 1500 mL fluid restriction  VTE PPx:  Ambulatory  Goals of Care:  Full        Disposition:  Pending diuresis, cardiology evaluation inpatient  Discharge Needs:  Cardiology outpatient        Graham Polanco MD  [facility] Medicine  Spectra:  [phone]  Pager: [phone]

## 2020-08-26 NOTE — ASSESSMENT & PLAN NOTE
Patient is a 45 year old male with no significant PMHx who presents with 1 month of worsening SOB, orthopnea, lower extremity swelling. Has been unable to lie flat to sleep, sitting almost upright in a chair at night. Patient denies any chest pain. Reports seeing PCP recently for SOB was prescribed azithromycin and has been taking that. Has been taking crestor 10 mg for cholesterol. Patient reports taking testosterone weekly for recreational purposes. Endorses heavy drinking periodically, as much as a bottle of whiskey in a day on some days. Quit tobacco 1 month ago, 20 pack-year history.    New onset HF with EF of 10% of unknown cause. Likely NICM 2/2 to EtOH, less likely CAD.  Discussed with patient the potential role of EtOH and benefits of abstaining  Patient verbalizes understanding and is willing to quit  - TTE demonstrates: EF 10%, eccentric LVH, LVIDD 6.8 cm, GIII LVDD, mod-to-sev RV dysfunction, CVP 15  - EKG: Sinus tachycardia with possible left atrial enlargement and nonspecific T waves changes  - Troponin 0.023 -> 0.029 -> 0.042  -     -- Fluid restriction at 1500 cc with strict I/Os and daily STANDING weights  -- Continue diuresis with 2-3 L goal, good UOP  -- Recommend afternoon BMP  -- Maintain on telemetry and daily EKGs   -- Up to date risk stratification: TSH, Lipids, HbA1c with optimization of risk factors is necessary  -- Check Electrolytes, keep Mg >2 and K >4  -- Ambulate as tolerated  -- Optimal therapy at this time to include:   -- Continue crestor   -- Start Coreg 6.25 mg BID     -- Will reevaluate in 1-2 days for initiation of ARB or entresto   -- Would like to monitor before starting additional therapy as patient is naive to these medications  -- Plan for [facility] today   -- Heart failure clinic follow up    -- Follow up transitional care visit for heart failure at discharge   -- Discharge with event monitor (for development of A.Fib with EtOH use)

## 2020-08-26 NOTE — SUBJECTIVE & OBJECTIVE
Interval History: No acute events. Continues to have great UOP.    Review of Systems   Cardiovascular: Positive for leg swelling. Negative for chest pain, orthopnea and syncope.   Respiratory: Negative for shortness of breath and wheezing.         Chest tightness (improved with albuterol)   Gastrointestinal: Negative for nausea and vomiting.   Neurological: Negative for light-headedness.     Objective:     Vital Signs (Most Recent):  Temp: 98.1 °F (36.7 °C) (08/26/20 0731)  Pulse: 104 (08/26/20 0731)  Resp: 18 (08/26/20 0731)  BP: (!) 142/81 (08/26/20 0731)  SpO2: 96 % (08/26/20 0731) Vital Signs (24h Range):  Temp:  [97 °F (36.1 °C)-98.2 °F (36.8 °C)] 98.1 °F (36.7 °C)  Pulse:  [] 104  Resp:  [16-22] 18  SpO2:  [92 %-98 %] 96 %  BP: (128-142)/(80-96) 142/81     Weight: 106.7 kg (235 lb 3.7 oz)  Body mass index is 32.82 kg/m².     SpO2: 96 %  O2 Device (Oxygen Therapy): room air      Intake/Output Summary (Last 24 hours) at 8/26/2020 0912  Last data filed at 8/26/2020 0500  Gross per 24 hour   Intake 803 ml   Output 3600 ml   Net -2797 ml       Lines/Drains/Airways     Peripheral Intravenous Line                 Peripheral IV - Single Lumen 08/24/20 1536 20 G Left Antecubital 1 day                Physical Exam   Constitutional: He is oriented to person, place, and time. He appears well-developed. No distress.   HENT:   Head: Normocephalic and atraumatic.   Neck: JVD present.   Cardiovascular: Normal rate, regular rhythm, normal heart sounds and intact distal pulses. Exam reveals no gallop and no friction rub.   No murmur heard.  Pulmonary/Chest: Effort normal. No respiratory distress. He has no wheezes. He has no rales.   Musculoskeletal:         General: Edema (bilaterally up past ankles, improved from yesterday) present.   Neurological: He is oriented to person, place, and time.   Skin: Skin is warm and dry. He is not diaphoretic.   Nursing note and vitals reviewed.      Significant Labs:   CMP   Recent Labs    Lab 08/24/20  1349 08/25/20  0438 08/26/20  0512    141 138   K 4.4 4.1 4.4    106 105   CO2 24 24 24    73 73   BUN 8 9 11   CREATININE 1.1 0.9 0.9   CALCIUM 9.3 9.4 9.3   PROT 6.2 6.0 5.9*   ALBUMIN 3.5 3.4* 3.2*   BILITOT 1.3* 1.6* 1.8*   ALKPHOS 58 52* 51*   * 100* 64*   * 93* 74*   ANIONGAP 9 11 9   ESTGFRAFRICA >60.0 >60.0 >60.0   EGFRNONAA >60.0 >60.0 >60.0   , CBC   Recent Labs   Lab 08/24/20  1349 08/26/20  0512   WBC 12.43 12.25   HGB 14.7 15.9   HCT 48.3 51.8    255   , Lipid Panel   Recent Labs   Lab 08/25/20  0438   CHOL 114*   HDL 20*   LDLCALC 74.6   TRIG 97   CHOLHDL 17.5*    and All pertinent lab results from the last 24 hours have been reviewed.    Significant Imaging: Echocardiogram:   Transthoracic echo (TTE) complete (Cupid Only):   Results for orders placed or performed during the hospital encounter of 08/24/20   Echo Color Flow Doppler? Yes   Result Value Ref Range    Ascending aorta 4.01 cm    STJ 3.75 cm    AV mean gradient 1 mmHg    Ao peak john 0.72 m/s    Ao VTI 9.26 cm    IVS 0.89 0.6 - 1.1 cm    Left Atrium Major Axis 4.63 cm    Left Atrium Minor Axis 5.66 cm    LVIDD 6.79 (A) 3.5 - 6.0 cm    LVIDS 6.41 (A) 2.1 - 4.0 cm    LVOT diameter 2.44 cm    LVOT peak VTI 11.06 cm    PW 1.23 (A) 0.6 - 1.1 cm    MV Peak A John 0.26 m/s    E wave decelartion time 154.52 msec    MV Peak E John 0.97 m/s    RA Major Axis 5.30 cm    RA Width 5.04 cm    RVDD 5.31 cm    Sinus 3.85 cm    TAPSE 1.55 cm    TDI LATERAL 0.04 m/s    TDI SEPTAL 0.04 m/s    LA WIDTH 4.21 cm    MV stenosis pressure 1/2 time 44.81 ms    LV Diastolic Volume 238.26 mL    LV Systolic Volume 209.43 mL    RV S' 8.08 cm/s    LVOT peak john 0.72 m/s    LV LATERAL E/E' RATIO 24.25 m/s    LV SEPTAL E/E' RATIO 24.25 m/s    FS 6 %    LV mass 328.66 g    Left Ventricle Relative Wall Thickness 0.36 cm    AV valve area 5.58 cm2    AV Velocity Ratio 1.00     AV index (prosthetic) 1.19     MV valve area p 1/2  method 4.91 cm2    E/A ratio 3.73     Mean e' 0.04 m/s    LVOT area 4.7 cm2    LVOT stroke volume 51.69 cm3    AV peak gradient 2 mmHg    E/E' ratio 24.25 m/s    LV Systolic Volume Index 91.8 mL/m2    LV Diastolic Volume Index 104.44 mL/m2    LV Mass Index 144 g/m2    BSA 2.34 m2    Right Atrial Pressure (from IVC) 15 mmHg    Narrative    · Eccentric left ventricular hypertrophy.  · Severely decreased left ventricular systolic function. The estimated   ejection fraction is 10%.  · Severe left ventricular enlargement.  · Grade III (severe) left ventricular diastolic dysfunction consistent   with restrictive physiology.  · No wall motion abnormalities.  · Moderately to severely reduced right ventricular systolic function.  · Moderate right ventricular enlargement.  · Mild mitral regurgitation.  · Mild tricuspid regurgitation.  · Elevated central venous pressure (15 mmHg).

## 2020-08-26 NOTE — CONSULTS
[facility]  Interventional Cardiology  Consult Note    Patient Name: Dev Sweeney  MRN: [id number]  Admission Date: 8/24/2020  Hospital Length of Stay: 0 days  Code Status: Full Code   Attending Provider: Graham Polanco MD  Consulting Provider: Drew Alanis MD  Primary Care Physician: Loki Alba DO  Principal Problem:New onset of congestive heart failure    Patient information was obtained from patient and ER records.     Consults  Subjective:     Chief Complaint:  Cardiomyopathy     HPI:  Mr. Sweeney is a 45 year old male who presents to the ER for evaluation of shortness of breath and leg swelling.  He mentions that since July, he has been having worsening shortness of breath, cough, and leg swelling.  He went to an Urgent Care twice since that time where he was given Azithromycin and Prednisone.  Despite this, his breathing didn't improve.  He endorses shortness of breath at rest and with exertion.  He is unable to sleep and lie flat, so has been attempting to sleep in a chair.  He endorses BLE pitting edema as well as about a 20lb weight gain.  He went to see a Pulmonologist on the day of admission, who sent him to the ER for further evaluation.  Of note, he quit smoking 1 month prior to admission (20 pack year) and uses testosterone weekly for recreational purposes.  He denies any chest pain, fevers or chills.     Upon arrival to the ER, vitals were temp 98.1F,  and /87 satting 94% on room air.  Labs showed  and elevated AST/ALT and bilirubin.  CXR showed CHF and CTA showed possibly opacities, likely artifact.  He was given Lasix 20mg IV and was admitted to Hospital Medicine for further management. ECHO reveals EF 10%. Cardiology consulted for new-onset HF.    At length discussion with patient, concerns with job security and needing to return to work, not able to proceed with NM Stress, discussed with Dr. Lao, plan for right radial LHC diagnostic.      Past Medical History:   Diagnosis Date    Allergy     Asthma     GERD (gastroesophageal reflux disease)     Hyperlipidemia     Hypertension     Insomnia        Past Surgical History:   Procedure Laterality Date    FRACTURE SURGERY      HERNIA REPAIR      ORBITAL FRACTURE SURGERY      left    VASECTOMY         Review of patient's allergies indicates:   Allergen Reactions    Penicillins Anaphylaxis     Other reaction(s): Hives  Other reaction(s): Difficulty breathing       PTA Medications   Medication Sig    albuterol (PROVENTIL/VENTOLIN HFA) 90 mcg/actuation inhaler Inhale 2 puffs into the lungs every 6 (six) hours as needed for Wheezing or Shortness of Breath.    azithromycin (Z-CHET) 250 MG tablet Take 2 tablets by mouth x 1 for day 1 Then take 1 tablet by mouth daily for day 2 - 5    levocetirizine (XYZAL) 5 MG tablet Take 1 tablet (5 mg total) by mouth every evening.    omeprazole (PRILOSEC) 40 MG capsule TAKE 1 CAPSULE BY MOUTH EVERY DAY IN THE MORNING    rosuvastatin (CRESTOR) 10 MG tablet Take 1 tablet (10 mg total) by mouth once daily.    traZODone (DESYREL) 100 MG tablet Take 1 tablet (100 mg total) by mouth nightly as needed for Insomnia.    albuterol (PROVENTIL) 2.5 mg /3 mL (0.083 %) nebulizer solution Take 3 mLs (2.5 mg total) by nebulization every 6 (six) hours as needed for Wheezing. Rescue    HYDROcodone-acetaminophen (NORCO)  mg per tablet      Family History     Problem Relation (Age of Onset)    Cancer Paternal Grandmother, Paternal Grandfather    Diabetes Paternal Grandmother    Heart disease Mother        Tobacco Use    Smoking status: Former Smoker     Packs/day: 1.00     Years: 15.00     Pack years: 15.00    Smokeless tobacco: Never Used    Tobacco comment: trying to quit   Substance and Sexual Activity    Alcohol use: Yes     Alcohol/week: 1.0 standard drinks     Types: 1 Shots of liquor per week    Drug use: No    Sexual activity: Not on file     Review of  Systems   Constitution: Negative for chills, decreased appetite, diaphoresis, fever, weight gain and weight loss.   Eyes: Negative for blurred vision.   Cardiovascular: Negative for chest pain, dyspnea on exertion, irregular heartbeat, leg swelling, near-syncope, orthopnea and palpitations.   Respiratory: Negative for cough, shortness of breath, snoring and wheezing.    Gastrointestinal: Negative for abdominal pain, nausea and vomiting.   Genitourinary: Negative for bladder incontinence and urgency.     Objective:     Vital Signs (Most Recent):  Temp: 98.1 °F (36.7 °C) (08/26/20 0731)  Pulse: 104 (08/26/20 0731)  Resp: 18 (08/26/20 0731)  BP: (!) 142/81 (08/26/20 0731)  SpO2: 96 % (08/26/20 0731) Vital Signs (24h Range):  Temp:  [97 °F (36.1 °C)-98.2 °F (36.8 °C)] 98.1 °F (36.7 °C)  Pulse:  [] 104  Resp:  [16-22] 18  SpO2:  [92 %-98 %] 96 %  BP: (128-142)/(80-96) 142/81     Weight: 106.7 kg (235 lb 3.7 oz)  Body mass index is 32.82 kg/m².    SpO2: 96 %  O2 Device (Oxygen Therapy): room air      Intake/Output Summary (Last 24 hours) at 8/26/2020 0919  Last data filed at 8/26/2020 0500  Gross per 24 hour   Intake 803 ml   Output 3600 ml   Net -2797 ml       Lines/Drains/Airways     Peripheral Intravenous Line                 Peripheral IV - Single Lumen 08/24/20 1536 20 G Left Antecubital 1 day                Physical Exam   Constitutional: He is oriented to person, place, and time. He appears well-developed and well-nourished. No distress.   Neck: No JVD present.   Cardiovascular: Normal rate, regular rhythm, normal heart sounds and intact distal pulses. Exam reveals no gallop and no friction rub.   No murmur heard.  Pulmonary/Chest: Effort normal and breath sounds normal. No respiratory distress. He has no wheezes. He has no rales. He exhibits no tenderness.   Abdominal: Soft. Bowel sounds are normal. He exhibits no distension and no mass. There is no abdominal tenderness. There is no rebound and no guarding.    Musculoskeletal: Normal range of motion.         General: No edema.   Neurological: He is alert and oriented to person, place, and time.   Skin: Skin is warm and dry. He is not diaphoretic. No erythema.   Nursing note and vitals reviewed.      Significant Labs:   BMP:   Recent Labs   Lab 08/24/20  1349 08/24/20  1836 08/25/20  0438 08/26/20  0512     --  73 73     --  141 138   K 4.4  --  4.1 4.4     --  106 105   CO2 24  --  24 24   BUN 8  --  9 11   CREATININE 1.1  --  0.9 0.9   CALCIUM 9.3  --  9.4 9.3   MG 1.6 1.4*  --  1.5*   , CMP   Recent Labs   Lab 08/24/20  1349 08/25/20  0438 08/26/20  0512    141 138   K 4.4 4.1 4.4    106 105   CO2 24 24 24    73 73   BUN 8 9 11   CREATININE 1.1 0.9 0.9   CALCIUM 9.3 9.4 9.3   PROT 6.2 6.0 5.9*   ALBUMIN 3.5 3.4* 3.2*   BILITOT 1.3* 1.6* 1.8*   ALKPHOS 58 52* 51*   * 100* 64*   * 93* 74*   ANIONGAP 9 11 9   ESTGFRAFRICA >60.0 >60.0 >60.0   EGFRNONAA >60.0 >60.0 >60.0   , CBC   Recent Labs   Lab 08/24/20  1349 08/26/20  0512   WBC 12.43 12.25   HGB 14.7 15.9   HCT 48.3 51.8    255    and INR   Recent Labs   Lab 08/24/20  1349   INR 1.1       Significant Imaging: Echocardiogram:   Transthoracic echo (TTE) complete (Cupid Only):   Results for orders placed or performed during the hospital encounter of 08/24/20   Echo Color Flow Doppler? Yes   Result Value Ref Range    Ascending aorta 4.01 cm    STJ 3.75 cm    AV mean gradient 1 mmHg    Ao peak john 0.72 m/s    Ao VTI 9.26 cm    IVS 0.89 0.6 - 1.1 cm    Left Atrium Major Axis 4.63 cm    Left Atrium Minor Axis 5.66 cm    LVIDD 6.79 (A) 3.5 - 6.0 cm    LVIDS 6.41 (A) 2.1 - 4.0 cm    LVOT diameter 2.44 cm    LVOT peak VTI 11.06 cm    PW 1.23 (A) 0.6 - 1.1 cm    MV Peak A John 0.26 m/s    E wave decelartion time 154.52 msec    MV Peak E John 0.97 m/s    RA Major Axis 5.30 cm    RA Width 5.04 cm    RVDD 5.31 cm    Sinus 3.85 cm    TAPSE 1.55 cm    TDI LATERAL 0.04 m/s     TDI SEPTAL 0.04 m/s    LA WIDTH 4.21 cm    MV stenosis pressure 1/2 time 44.81 ms    LV Diastolic Volume 238.26 mL    LV Systolic Volume 209.43 mL    RV S' 8.08 cm/s    LVOT peak ag 0.72 m/s    LV LATERAL E/E' RATIO 24.25 m/s    LV SEPTAL E/E' RATIO 24.25 m/s    FS 6 %    LV mass 328.66 g    Left Ventricle Relative Wall Thickness 0.36 cm    AV valve area 5.58 cm2    AV Velocity Ratio 1.00     AV index (prosthetic) 1.19     MV valve area p 1/2 method 4.91 cm2    E/A ratio 3.73     Mean e' 0.04 m/s    LVOT area 4.7 cm2    LVOT stroke volume 51.69 cm3    AV peak gradient 2 mmHg    E/E' ratio 24.25 m/s    LV Systolic Volume Index 91.8 mL/m2    LV Diastolic Volume Index 104.44 mL/m2    LV Mass Index 144 g/m2    BSA 2.34 m2    Right Atrial Pressure (from IVC) 15 mmHg    Narrative    · Eccentric left ventricular hypertrophy.  · Severely decreased left ventricular systolic function. The estimated   ejection fraction is 10%.  · Severe left ventricular enlargement.  · Grade III (severe) left ventricular diastolic dysfunction consistent   with restrictive physiology.  · No wall motion abnormalities.  · Moderately to severely reduced right ventricular systolic function.  · Moderate right ventricular enlargement.  · Mild mitral regurgitation.  · Mild tricuspid regurgitation.  · Elevated central venous pressure (15 mmHg).        Assessment and Plan:     * New onset of congestive heart failure    --LHC, Diagnostic   - Anti-platelet Therapy: ASA   - Access: Right Radial   - Catheters:  Jakcy  - Creatinine/CrCl: 0.9  - Allergies: No shellfish / Iodine allergy  - Pre-Hydration: NS  - Pre-Op Med: Bendaryl 50mg pO   - All patient's questions were answered.  -The risks, benefits and alternatives of the procedure were explained to the patient.   -The risks of coronary angiography include but are not limited to: bleeding, infection, heart rhythm abnormalities, allergic reactions, kidney injury and potential need for dialysis, stroke and  death.   - Should stenting be indicated, the patient has agreed to dual anti-platelet therapy for 1-consecutive year with a drug-eluting stent and a minimum of 1-month with the use of a bare metal stent  - Additionally, pt is aware that non-compliance is likely to result in stent clotting with heart attack, heart failure, and/or death  -The risks of moderate sedation include hypotension, respiratory depression, arrhythmias, bronchospasm, and death.   - Informed consent was obtained and the  patient is agreeable to proceed with the procedure.            VTE Risk Mitigation (From admission, onward)         Ordered     IP VTE LOW RISK PATIENT  Once      08/24/20 1820     Place sequential compression device  Until discontinued      08/24/20 1819                Thank you for your consult. I will follow-up with patient. Please contact us if you have any additional questions.    Drew Alanis MD  Interventional Cardiology   [facility]

## 2020-08-26 NOTE — SUBJECTIVE & OBJECTIVE
Past Medical History:   Diagnosis Date    Allergy     Asthma     GERD (gastroesophageal reflux disease)     Hyperlipidemia     Hypertension     Insomnia        Past Surgical History:   Procedure Laterality Date    FRACTURE SURGERY      HERNIA REPAIR      ORBITAL FRACTURE SURGERY      left    VASECTOMY         Review of patient's allergies indicates:   Allergen Reactions    Penicillins Anaphylaxis     Other reaction(s): Hives  Other reaction(s): Difficulty breathing       PTA Medications   Medication Sig    albuterol (PROVENTIL/VENTOLIN HFA) 90 mcg/actuation inhaler Inhale 2 puffs into the lungs every 6 (six) hours as needed for Wheezing or Shortness of Breath.    azithromycin (Z-CHET) 250 MG tablet Take 2 tablets by mouth x 1 for day 1 Then take 1 tablet by mouth daily for day 2 - 5    levocetirizine (XYZAL) 5 MG tablet Take 1 tablet (5 mg total) by mouth every evening.    omeprazole (PRILOSEC) 40 MG capsule TAKE 1 CAPSULE BY MOUTH EVERY DAY IN THE MORNING    rosuvastatin (CRESTOR) 10 MG tablet Take 1 tablet (10 mg total) by mouth once daily.    traZODone (DESYREL) 100 MG tablet Take 1 tablet (100 mg total) by mouth nightly as needed for Insomnia.    albuterol (PROVENTIL) 2.5 mg /3 mL (0.083 %) nebulizer solution Take 3 mLs (2.5 mg total) by nebulization every 6 (six) hours as needed for Wheezing. Rescue    HYDROcodone-acetaminophen (NORCO)  mg per tablet      Family History     Problem Relation (Age of Onset)    Cancer Paternal Grandmother, Paternal Grandfather    Diabetes Paternal Grandmother    Heart disease Mother        Tobacco Use    Smoking status: Former Smoker     Packs/day: 1.00     Years: 15.00     Pack years: 15.00    Smokeless tobacco: Never Used    Tobacco comment: trying to quit   Substance and Sexual Activity    Alcohol use: Yes     Alcohol/week: 1.0 standard drinks     Types: 1 Shots of liquor per week    Drug use: No    Sexual activity: Not on file     Review of  Systems   Constitution: Negative for chills, decreased appetite, diaphoresis, fever, weight gain and weight loss.   Eyes: Negative for blurred vision.   Cardiovascular: Negative for chest pain, dyspnea on exertion, irregular heartbeat, leg swelling, near-syncope, orthopnea and palpitations.   Respiratory: Negative for cough, shortness of breath, snoring and wheezing.    Gastrointestinal: Negative for abdominal pain, nausea and vomiting.   Genitourinary: Negative for bladder incontinence and urgency.     Objective:     Vital Signs (Most Recent):  Temp: 98.1 °F (36.7 °C) (08/26/20 0731)  Pulse: 104 (08/26/20 0731)  Resp: 18 (08/26/20 0731)  BP: (!) 142/81 (08/26/20 0731)  SpO2: 96 % (08/26/20 0731) Vital Signs (24h Range):  Temp:  [97 °F (36.1 °C)-98.2 °F (36.8 °C)] 98.1 °F (36.7 °C)  Pulse:  [] 104  Resp:  [16-22] 18  SpO2:  [92 %-98 %] 96 %  BP: (128-142)/(80-96) 142/81     Weight: 106.7 kg (235 lb 3.7 oz)  Body mass index is 32.82 kg/m².    SpO2: 96 %  O2 Device (Oxygen Therapy): room air      Intake/Output Summary (Last 24 hours) at 8/26/2020 0919  Last data filed at 8/26/2020 0500  Gross per 24 hour   Intake 803 ml   Output 3600 ml   Net -2797 ml       Lines/Drains/Airways     Peripheral Intravenous Line                 Peripheral IV - Single Lumen 08/24/20 1536 20 G Left Antecubital 1 day                Physical Exam   Constitutional: He is oriented to person, place, and time. He appears well-developed and well-nourished. No distress.   Neck: No JVD present.   Cardiovascular: Normal rate, regular rhythm, normal heart sounds and intact distal pulses. Exam reveals no gallop and no friction rub.   No murmur heard.  Pulmonary/Chest: Effort normal and breath sounds normal. No respiratory distress. He has no wheezes. He has no rales. He exhibits no tenderness.   Abdominal: Soft. Bowel sounds are normal. He exhibits no distension and no mass. There is no abdominal tenderness. There is no rebound and no guarding.    Musculoskeletal: Normal range of motion.         General: No edema.   Neurological: He is alert and oriented to person, place, and time.   Skin: Skin is warm and dry. He is not diaphoretic. No erythema.   Nursing note and vitals reviewed.      Significant Labs:   BMP:   Recent Labs   Lab 08/24/20  1349 08/24/20  1836 08/25/20  0438 08/26/20  0512     --  73 73     --  141 138   K 4.4  --  4.1 4.4     --  106 105   CO2 24  --  24 24   BUN 8  --  9 11   CREATININE 1.1  --  0.9 0.9   CALCIUM 9.3  --  9.4 9.3   MG 1.6 1.4*  --  1.5*   , CMP   Recent Labs   Lab 08/24/20  1349 08/25/20  0438 08/26/20  0512    141 138   K 4.4 4.1 4.4    106 105   CO2 24 24 24    73 73   BUN 8 9 11   CREATININE 1.1 0.9 0.9   CALCIUM 9.3 9.4 9.3   PROT 6.2 6.0 5.9*   ALBUMIN 3.5 3.4* 3.2*   BILITOT 1.3* 1.6* 1.8*   ALKPHOS 58 52* 51*   * 100* 64*   * 93* 74*   ANIONGAP 9 11 9   ESTGFRAFRICA >60.0 >60.0 >60.0   EGFRNONAA >60.0 >60.0 >60.0   , CBC   Recent Labs   Lab 08/24/20  1349 08/26/20  0512   WBC 12.43 12.25   HGB 14.7 15.9   HCT 48.3 51.8    255    and INR   Recent Labs   Lab 08/24/20  1349   INR 1.1       Significant Imaging: Echocardiogram:   Transthoracic echo (TTE) complete (Cupid Only):   Results for orders placed or performed during the hospital encounter of 08/24/20   Echo Color Flow Doppler? Yes   Result Value Ref Range    Ascending aorta 4.01 cm    STJ 3.75 cm    AV mean gradient 1 mmHg    Ao peak john 0.72 m/s    Ao VTI 9.26 cm    IVS 0.89 0.6 - 1.1 cm    Left Atrium Major Axis 4.63 cm    Left Atrium Minor Axis 5.66 cm    LVIDD 6.79 (A) 3.5 - 6.0 cm    LVIDS 6.41 (A) 2.1 - 4.0 cm    LVOT diameter 2.44 cm    LVOT peak VTI 11.06 cm    PW 1.23 (A) 0.6 - 1.1 cm    MV Peak A John 0.26 m/s    E wave decelartion time 154.52 msec    MV Peak E John 0.97 m/s    RA Major Axis 5.30 cm    RA Width 5.04 cm    RVDD 5.31 cm    Sinus 3.85 cm    TAPSE 1.55 cm    TDI LATERAL 0.04 m/s     TDI SEPTAL 0.04 m/s    LA WIDTH 4.21 cm    MV stenosis pressure 1/2 time 44.81 ms    LV Diastolic Volume 238.26 mL    LV Systolic Volume 209.43 mL    RV S' 8.08 cm/s    LVOT peak ag 0.72 m/s    LV LATERAL E/E' RATIO 24.25 m/s    LV SEPTAL E/E' RATIO 24.25 m/s    FS 6 %    LV mass 328.66 g    Left Ventricle Relative Wall Thickness 0.36 cm    AV valve area 5.58 cm2    AV Velocity Ratio 1.00     AV index (prosthetic) 1.19     MV valve area p 1/2 method 4.91 cm2    E/A ratio 3.73     Mean e' 0.04 m/s    LVOT area 4.7 cm2    LVOT stroke volume 51.69 cm3    AV peak gradient 2 mmHg    E/E' ratio 24.25 m/s    LV Systolic Volume Index 91.8 mL/m2    LV Diastolic Volume Index 104.44 mL/m2    LV Mass Index 144 g/m2    BSA 2.34 m2    Right Atrial Pressure (from IVC) 15 mmHg    Narrative    · Eccentric left ventricular hypertrophy.  · Severely decreased left ventricular systolic function. The estimated   ejection fraction is 10%.  · Severe left ventricular enlargement.  · Grade III (severe) left ventricular diastolic dysfunction consistent   with restrictive physiology.  · No wall motion abnormalities.  · Moderately to severely reduced right ventricular systolic function.  · Moderate right ventricular enlargement.  · Mild mitral regurgitation.  · Mild tricuspid regurgitation.  · Elevated central venous pressure (15 mmHg).

## 2020-08-26 NOTE — NURSING TRANSFER
Nursing Transfer Note      8/26/2020     Transfer To: cath lab    Transfer via stretcher    Transfer with cardiac monitoring    Transported by transport    Medicines sent: na    Chart send with patient: Yes    Notified: family at bedside

## 2020-08-26 NOTE — ASSESSMENT & PLAN NOTE
--[facility], Diagnostic   - Anti-platelet Therapy: ASA   - Access: Right Radial   - Catheters:  Jakcy  - Creatinine/CrCl: 0.9  - Allergies: No shellfish / Iodine allergy  - Pre-Hydration: NS  - Pre-Op Med: Bendaryl 50mg pO   - All patient's questions were answered.  -The risks, benefits and alternatives of the procedure were explained to the patient.   -The risks of coronary angiography include but are not limited to: bleeding, infection, heart rhythm abnormalities, allergic reactions, kidney injury and potential need for dialysis, stroke and death.   - Should stenting be indicated, the patient has agreed to dual anti-platelet therapy for 1-consecutive year with a drug-eluting stent and a minimum of 1-month with the use of a bare metal stent  - Additionally, pt is aware that non-compliance is likely to result in stent clotting with heart attack, heart failure, and/or death  -The risks of moderate sedation include hypotension, respiratory depression, arrhythmias, bronchospasm, and death.   - Informed consent was obtained and the  patient is agreeable to proceed with the procedure.

## 2020-08-26 NOTE — PROCEDURES
"    Post Cath Note  Referring Physician: Graham Polanco MD  Procedure: Left heart cath (Right), ANGIOGRAM, CORONARY ARTERY (N/A)    Findings:   Access: Right radial    Normal coronary arteries by angiography.    LVEDP 30 mmHg, LVEF depressed    See full report for further details    Intervention:     Closure device: Radial band    Post Cath Exam:   /88 (BP Location: Right arm, Patient Position: Lying)   Pulse 107   Temp 96.9 °F (36.1 °C) (Oral)   Resp 17   Ht 5' 10.98" (1.803 m)   Wt 106.7 kg (235 lb 3.7 oz)   SpO2 97%   BMI 32.82 kg/m²   No unusual pain, hematoma, thrill or bruit at vascular access site.  Distal pulse present without signs of ischemia.    Recommendations:   - Routine post-cath care  - IVF at 100 mL/hr for 4 hrs  - Continue medical management / GDMT   - Follow-up with outpatient cardiologist      Signed:  Rosa Maria Burnett MD  Interventional Cardiology   8/26/2020 12:52 PM      "

## 2020-08-26 NOTE — HPI
Mr. Sweeney is a 45 year old male who presents to the ER for evaluation of shortness of breath and leg swelling.  He mentions that since July, he has been having worsening shortness of breath, cough, and leg swelling.  He went to an Urgent Care twice since that time where he was given Azithromycin and Prednisone.  Despite this, his breathing didn't improve.  He endorses shortness of breath at rest and with exertion.  He is unable to sleep and lie flat, so has been attempting to sleep in a chair.  He endorses BLE pitting edema as well as about a 20lb weight gain.  He went to see a Pulmonologist on the day of admission, who sent him to the ER for further evaluation.  Of note, he quit smoking 1 month prior to admission (20 pack year) and uses testosterone weekly for recreational purposes.  He denies any chest pain, fevers or chills.     Upon arrival to the ER, vitals were temp 98.1F,  and /87 satting 94% on room air.  Labs showed  and elevated AST/ALT and bilirubin.  CXR showed CHF and CTA showed possibly opacities, likely artifact.  He was given Lasix 20mg IV and was admitted to Hospital Medicine for further management. ECHO reveals EF 10%. Cardiology consulted for new-onset HF.    At length discussion with patient, concerns with job security and needing to return to work, not able to proceed with NM Stress, discussed with Dr. Lao, plan for right radial LHC diagnostic.

## 2020-08-26 NOTE — PROGRESS NOTES
[facility]  Cardiology  Progress Note    Patient Name: Dev Sweeney  MRN: [id number]  Admission Date: 8/24/2020  Hospital Length of Stay: 0 days  Code Status: Full Code   Attending Physician: Graham Polanco MD   Primary Care Physician: Loki Alba DO  Expected Discharge Date: 8/27/2020  Principal Problem:New onset of congestive heart failure    Subjective:     Hospital Course:   No notes on file    Interval History: No acute events. Continues to have great UOP.    Review of Systems   Cardiovascular: Positive for leg swelling. Negative for chest pain, orthopnea and syncope.   Respiratory: Negative for shortness of breath and wheezing.         Chest tightness (improved with albuterol)   Gastrointestinal: Negative for nausea and vomiting.   Neurological: Negative for light-headedness.     Objective:     Vital Signs (Most Recent):  Temp: 98.1 °F (36.7 °C) (08/26/20 0731)  Pulse: 104 (08/26/20 0731)  Resp: 18 (08/26/20 0731)  BP: (!) 142/81 (08/26/20 0731)  SpO2: 96 % (08/26/20 0731) Vital Signs (24h Range):  Temp:  [97 °F (36.1 °C)-98.2 °F (36.8 °C)] 98.1 °F (36.7 °C)  Pulse:  [] 104  Resp:  [16-22] 18  SpO2:  [92 %-98 %] 96 %  BP: (128-142)/(80-96) 142/81     Weight: 106.7 kg (235 lb 3.7 oz)  Body mass index is 32.82 kg/m².     SpO2: 96 %  O2 Device (Oxygen Therapy): room air      Intake/Output Summary (Last 24 hours) at 8/26/2020 0912  Last data filed at 8/26/2020 0500  Gross per 24 hour   Intake 803 ml   Output 3600 ml   Net -2797 ml       Lines/Drains/Airways     Peripheral Intravenous Line                 Peripheral IV - Single Lumen 08/24/20 1536 20 G Left Antecubital 1 day                Physical Exam   Constitutional: He is oriented to person, place, and time. He appears well-developed. No distress.   HENT:   Head: Normocephalic and atraumatic.   Neck: JVD present.   Cardiovascular: Normal rate, regular rhythm, normal heart sounds and intact distal pulses. Exam reveals  no gallop and no friction rub.   No murmur heard.  Pulmonary/Chest: Effort normal. No respiratory distress. He has no wheezes. He has no rales.   Musculoskeletal:         General: Edema (bilaterally up past ankles, improved from yesterday) present.   Neurological: He is oriented to person, place, and time.   Skin: Skin is warm and dry. He is not diaphoretic.   Nursing note and vitals reviewed.      Significant Labs:   CMP   Recent Labs   Lab 08/24/20  1349 08/25/20  0438 08/26/20  0512    141 138   K 4.4 4.1 4.4    106 105   CO2 24 24 24    73 73   BUN 8 9 11   CREATININE 1.1 0.9 0.9   CALCIUM 9.3 9.4 9.3   PROT 6.2 6.0 5.9*   ALBUMIN 3.5 3.4* 3.2*   BILITOT 1.3* 1.6* 1.8*   ALKPHOS 58 52* 51*   * 100* 64*   * 93* 74*   ANIONGAP 9 11 9   ESTGFRAFRICA >60.0 >60.0 >60.0   EGFRNONAA >60.0 >60.0 >60.0   , CBC   Recent Labs   Lab 08/24/20  1349 08/26/20  0512   WBC 12.43 12.25   HGB 14.7 15.9   HCT 48.3 51.8    255   , Lipid Panel   Recent Labs   Lab 08/25/20  0438   CHOL 114*   HDL 20*   LDLCALC 74.6   TRIG 97   CHOLHDL 17.5*    and All pertinent lab results from the last 24 hours have been reviewed.    Significant Imaging: Echocardiogram:   Transthoracic echo (TTE) complete (Cupid Only):   Results for orders placed or performed during the hospital encounter of 08/24/20   Echo Color Flow Doppler? Yes   Result Value Ref Range    Ascending aorta 4.01 cm    STJ 3.75 cm    AV mean gradient 1 mmHg    Ao peak john 0.72 m/s    Ao VTI 9.26 cm    IVS 0.89 0.6 - 1.1 cm    Left Atrium Major Axis 4.63 cm    Left Atrium Minor Axis 5.66 cm    LVIDD 6.79 (A) 3.5 - 6.0 cm    LVIDS 6.41 (A) 2.1 - 4.0 cm    LVOT diameter 2.44 cm    LVOT peak VTI 11.06 cm    PW 1.23 (A) 0.6 - 1.1 cm    MV Peak A John 0.26 m/s    E wave decelartion time 154.52 msec    MV Peak E John 0.97 m/s    RA Major Axis 5.30 cm    RA Width 5.04 cm    RVDD 5.31 cm    Sinus 3.85 cm    TAPSE 1.55 cm    TDI LATERAL 0.04 m/s    TDI  SEPTAL 0.04 m/s    LA WIDTH 4.21 cm    MV stenosis pressure 1/2 time 44.81 ms    LV Diastolic Volume 238.26 mL    LV Systolic Volume 209.43 mL    RV S' 8.08 cm/s    LVOT peak ag 0.72 m/s    LV LATERAL E/E' RATIO 24.25 m/s    LV SEPTAL E/E' RATIO 24.25 m/s    FS 6 %    LV mass 328.66 g    Left Ventricle Relative Wall Thickness 0.36 cm    AV valve area 5.58 cm2    AV Velocity Ratio 1.00     AV index (prosthetic) 1.19     MV valve area p 1/2 method 4.91 cm2    E/A ratio 3.73     Mean e' 0.04 m/s    LVOT area 4.7 cm2    LVOT stroke volume 51.69 cm3    AV peak gradient 2 mmHg    E/E' ratio 24.25 m/s    LV Systolic Volume Index 91.8 mL/m2    LV Diastolic Volume Index 104.44 mL/m2    LV Mass Index 144 g/m2    BSA 2.34 m2    Right Atrial Pressure (from IVC) 15 mmHg    Narrative    · Eccentric left ventricular hypertrophy.  · Severely decreased left ventricular systolic function. The estimated   ejection fraction is 10%.  · Severe left ventricular enlargement.  · Grade III (severe) left ventricular diastolic dysfunction consistent   with restrictive physiology.  · No wall motion abnormalities.  · Moderately to severely reduced right ventricular systolic function.  · Moderate right ventricular enlargement.  · Mild mitral regurgitation.  · Mild tricuspid regurgitation.  · Elevated central venous pressure (15 mmHg).        Assessment and Plan:     Brief HPI: Below    * New onset of congestive heart failure  Patient is a 45 year old male with no significant PMHx who presents with 1 month of worsening SOB, orthopnea, lower extremity swelling. Has been unable to lie flat to sleep, sitting almost upright in a chair at night. Patient denies any chest pain. Reports seeing PCP recently for SOB was prescribed azithromycin and has been taking that. Has been taking crestor 10 mg for cholesterol. Patient reports taking testosterone weekly for recreational purposes. Endorses heavy drinking periodically, as much as a bottle of whiskey in a  day on some days. Quit tobacco 1 month ago, 20 pack-year history.    New onset HF with EF of 10% of unknown cause. Likely NICM 2/2 to EtOH, less likely CAD.  Discussed with patient the potential role of EtOH and benefits of abstaining  Patient verbalizes understanding and is willing to quit  - TTE demonstrates: EF 10%, eccentric LVH, LVIDD 6.8 cm, GIII LVDD, mod-to-sev RV dysfunction, CVP 15  - EKG: Sinus tachycardia with possible left atrial enlargement and nonspecific T waves changes  - Troponin 0.023 -> 0.029 -> 0.042  -     -- Fluid restriction at 1500 cc with strict I/Os and daily STANDING weights  -- Continue diuresis with 2-3 L goal, good UOP  -- Recommend afternoon BMP  -- Maintain on telemetry and daily EKGs   -- Up to date risk stratification: TSH, Lipids, HbA1c with optimization of risk factors is necessary  -- Check Electrolytes, keep Mg >2 and K >4  -- Ambulate as tolerated  -- Optimal therapy at this time to include:   -- Continue crestor   -- Start Coreg 6.25 mg BID     -- Will reevaluate in 1-2 days for initiation of ARB or entresto   -- Would like to monitor before starting additional therapy as patient is naive to these medications  -- Plan for [facility] today   -- Heart failure clinic follow up    -- Follow up transitional care visit for heart failure at discharge   -- Discharge with event monitor (for development of A.Fib with EtOH use)        VTE Risk Mitigation (From admission, onward)         Ordered     heparin (porcine) injection  As needed (PRN)      08/26/20 1226     heparin (porcine) 750 Units, verapamiL 1.25 mg, nitroGLYCERIN 1.25 mg in sodium chloride 0.9% 250 mL  As needed (PRN)      08/26/20 1207     heparin infusion 1,000 units/500 ml in 0.9% NaCl (pressure line flush)  Intra-op continuous PRN      08/26/20 1207     IP VTE LOW RISK PATIENT  Once      08/24/20 1820     Place sequential compression device  Until discontinued      08/24/20 1819                Steven Jacobs,  MD  Cardiology  [facility]

## 2020-08-26 NOTE — PLAN OF CARE
POC reviewed with patient. VSS. A/O x4 up independently. SOB noted with exertion or anxiety, O2 sats remain greater than 95%. LHC completed to day. TR band removed 2 hours per orders post cath. Site remains soft, no bleeding noted. Slight tenderness at site of insertion once band removed. NS infused for 4 hours post cath.Education completed on monitoring site for bleeding or increased swelling at site. Will continue to monitor.

## 2020-08-26 NOTE — CONSULTS
Food & Nutrition  Education    Diet Education: Low sodium, fluid restriction  Time Spent: 10 minutes  Learners: Pt    Nutrition Education provided with handouts: Heart Failure Nutrition Therapy    Comments: Pt reports he is familiar with the low Na diet. Reinforced education and reviewed sodium restriction, foods high in sodium, and encouraged pt to use herbs/spices instead of salt. Reviewed fluid restriction and foods considered fluids (ice cream, popsicles, Jell-O, etc). Pt voiced understanding.    Pt reports good appetite PTA. Stable wt 217-220# x 1 year per chart w/ recent wt gain, likely fluid related. Pt with no indicators of malnutrition at this time.    All questions and concerns answered. Dietitian's contact information provided.     Follow-Up: Yes  Please Re-consult as needed  Thanks!

## 2020-08-27 VITALS
HEART RATE: 101 BPM | DIASTOLIC BLOOD PRESSURE: 81 MMHG | SYSTOLIC BLOOD PRESSURE: 134 MMHG | HEIGHT: 71 IN | RESPIRATION RATE: 20 BRPM | BODY MASS INDEX: 32.01 KG/M2 | TEMPERATURE: 98 F | OXYGEN SATURATION: 95 % | WEIGHT: 228.63 LBS

## 2020-08-27 LAB
ALBUMIN SERPL BCP-MCNC: 3.2 G/DL (ref 3.5–5.2)
ALP SERPL-CCNC: 54 U/L (ref 55–135)
ALT SERPL W/O P-5'-P-CCNC: 65 U/L (ref 10–44)
ANION GAP SERPL CALC-SCNC: 11 MMOL/L (ref 8–16)
AST SERPL-CCNC: 53 U/L (ref 10–40)
BASOPHILS # BLD AUTO: 0.13 K/UL (ref 0–0.2)
BASOPHILS NFR BLD: 1.2 % (ref 0–1.9)
BILIRUB SERPL-MCNC: 1.5 MG/DL (ref 0.1–1)
BUN SERPL-MCNC: 14 MG/DL (ref 6–20)
CALCIUM SERPL-MCNC: 9 MG/DL (ref 8.7–10.5)
CHLORIDE SERPL-SCNC: 100 MMOL/L (ref 95–110)
CO2 SERPL-SCNC: 26 MMOL/L (ref 23–29)
CREAT SERPL-MCNC: 1 MG/DL (ref 0.5–1.4)
DIFFERENTIAL METHOD: ABNORMAL
EOSINOPHIL # BLD AUTO: 0.2 K/UL (ref 0–0.5)
EOSINOPHIL NFR BLD: 1.8 % (ref 0–8)
ERYTHROCYTE [DISTWIDTH] IN BLOOD BY AUTOMATED COUNT: 17.6 % (ref 11.5–14.5)
EST. GFR  (AFRICAN AMERICAN): >60 ML/MIN/1.73 M^2
EST. GFR  (NON AFRICAN AMERICAN): >60 ML/MIN/1.73 M^2
GLUCOSE SERPL-MCNC: 70 MG/DL (ref 70–110)
HCT VFR BLD AUTO: 50.9 % (ref 40–54)
HGB BLD-MCNC: 15.7 G/DL (ref 14–18)
IMM GRANULOCYTES # BLD AUTO: 0.07 K/UL (ref 0–0.04)
IMM GRANULOCYTES NFR BLD AUTO: 0.7 % (ref 0–0.5)
LYMPHOCYTES # BLD AUTO: 1.7 K/UL (ref 1–4.8)
LYMPHOCYTES NFR BLD: 16 % (ref 18–48)
MCH RBC QN AUTO: 27.2 PG (ref 27–31)
MCHC RBC AUTO-ENTMCNC: 30.8 G/DL (ref 32–36)
MCV RBC AUTO: 88 FL (ref 82–98)
MONOCYTES # BLD AUTO: 1.1 K/UL (ref 0.3–1)
MONOCYTES NFR BLD: 10.2 % (ref 4–15)
NEUTROPHILS # BLD AUTO: 7.5 K/UL (ref 1.8–7.7)
NEUTROPHILS NFR BLD: 70.1 % (ref 38–73)
NRBC BLD-RTO: 0 /100 WBC
PLATELET # BLD AUTO: 259 K/UL (ref 150–350)
PMV BLD AUTO: 10.1 FL (ref 9.2–12.9)
POTASSIUM SERPL-SCNC: 3.6 MMOL/L (ref 3.5–5.1)
PROT SERPL-MCNC: 5.9 G/DL (ref 6–8.4)
RBC # BLD AUTO: 5.77 M/UL (ref 4.6–6.2)
SODIUM SERPL-SCNC: 137 MMOL/L (ref 136–145)
WBC # BLD AUTO: 10.66 K/UL (ref 3.9–12.7)

## 2020-08-27 PROCEDURE — 99217 PR OBSERVATION CARE DISCHARGE: ICD-10-PCS | Mod: ,,, | Performed by: HOSPITALIST

## 2020-08-27 PROCEDURE — 25000003 PHARM REV CODE 250: Performed by: STUDENT IN AN ORGANIZED HEALTH CARE EDUCATION/TRAINING PROGRAM

## 2020-08-27 PROCEDURE — 36415 COLL VENOUS BLD VENIPUNCTURE: CPT

## 2020-08-27 PROCEDURE — 99217 PR OBSERVATION CARE DISCHARGE: CPT | Mod: ,,, | Performed by: HOSPITALIST

## 2020-08-27 PROCEDURE — 25000003 PHARM REV CODE 250: Performed by: HOSPITALIST

## 2020-08-27 PROCEDURE — 85025 COMPLETE CBC W/AUTO DIFF WBC: CPT

## 2020-08-27 PROCEDURE — G0378 HOSPITAL OBSERVATION PER HR: HCPCS

## 2020-08-27 PROCEDURE — 80053 COMPREHEN METABOLIC PANEL: CPT

## 2020-08-27 RX ORDER — CARVEDILOL 6.25 MG/1
6.25 TABLET ORAL 2 TIMES DAILY
Qty: 180 TABLET | Refills: 3 | Status: SHIPPED | OUTPATIENT
Start: 2020-08-27 | End: 2021-08-27

## 2020-08-27 RX ORDER — LOSARTAN POTASSIUM 25 MG/1
25 TABLET ORAL DAILY
Qty: 90 TABLET | Refills: 3 | Status: SHIPPED | OUTPATIENT
Start: 2020-08-28 | End: 2021-08-28

## 2020-08-27 RX ORDER — NAPROXEN SODIUM 220 MG/1
81 TABLET, FILM COATED ORAL DAILY
Refills: 0
Start: 2020-08-28 | End: 2023-09-06

## 2020-08-27 RX ORDER — FUROSEMIDE 40 MG/1
40 TABLET ORAL DAILY
Qty: 90 TABLET | Refills: 3 | Status: SHIPPED | OUTPATIENT
Start: 2020-08-28 | End: 2022-01-28 | Stop reason: SDUPTHER

## 2020-08-27 RX ADMIN — FUROSEMIDE 40 MG: 40 TABLET ORAL at 08:08

## 2020-08-27 RX ADMIN — LOSARTAN POTASSIUM 25 MG: 25 TABLET, FILM COATED ORAL at 08:08

## 2020-08-27 RX ADMIN — ROSUVASTATIN CALCIUM 10 MG: 10 TABLET, FILM COATED ORAL at 08:08

## 2020-08-27 RX ADMIN — ASPIRIN 81 MG CHEWABLE TABLET 81 MG: 81 TABLET CHEWABLE at 08:08

## 2020-08-27 RX ADMIN — ACETAMINOPHEN 650 MG: 325 TABLET ORAL at 08:08

## 2020-08-27 RX ADMIN — CARVEDILOL 6.25 MG: 6.25 TABLET, FILM COATED ORAL at 08:08

## 2020-08-27 RX ADMIN — PANTOPRAZOLE SODIUM 40 MG: 40 TABLET, DELAYED RELEASE ORAL at 08:08

## 2020-08-27 NOTE — NURSING
Patient to be discharged to home. VSS. Meds delivered to bedside. PIV and tele removed. Heart Failure Education completed. Dressing to right wrist removed. Patient to drive self home.

## 2020-08-27 NOTE — ASSESSMENT & PLAN NOTE
Patient is a 45 year old male with no significant PMHx who presents with 1 month of worsening SOB, orthopnea, lower extremity swelling. Has been unable to lie flat to sleep, sitting almost upright in a chair at night. Patient denies any chest pain. Reports seeing PCP recently for SOB was prescribed azithromycin and has been taking that. Has been taking crestor 10 mg for cholesterol. Patient reports taking testosterone weekly for recreational purposes. Endorses heavy drinking periodically, as much as a bottle of whiskey in a day on some days. Quit tobacco 1 month ago, 20 pack-year history.    New onset HF with EF of 10% of unknown cause. Likely NICM 2/2 to EtOH, less likely CAD.  Discussed with patient the potential role of EtOH and benefits of abstaining  Patient verbalizes understanding and is willing to quit  - TTE demonstrates: EF 10%, eccentric LVH, LVIDD 6.8 cm, GIII LVDD, mod-to-sev RV dysfunction, CVP 15  - EKG: Sinus tachycardia with possible left atrial enlargement and nonspecific T waves changes  - Troponin 0.023 -> 0.029 -> 0.042  -     -- Fluid restriction at 1500 cc with strict I/Os and daily STANDING weights  -- Continue diuresis, good UOP  -- Check Electrolytes, keep Mg >2 and K >4  -- Ambulate as tolerated    Cardiac cath negative --> NICM  HF stable  -- Continue GDMT  -- Discontinue ASA  -- Crestor 10 mg qd  -- Recommend increasing coreg to 12.5 mg BID  -- Lasix 40 mg qd  -- Continue losartan  -- Do not recommend event monitor at this time but was considered initially to monitor for A.Fib    -- BNP, CMP, Mg in 2 weeks  -- Follow up in a cardiology clinic  -- Repeat ECHO in 12 weeks  -- Recommend CAROLYNN sleep study outpatient    Please reach out to cardiology with any questions or concerns.

## 2020-08-27 NOTE — SUBJECTIVE & OBJECTIVE
Interval History: No acute events. Great UOP.    Review of Systems   Cardiovascular: Positive for leg swelling. Negative for chest pain, orthopnea and syncope.   Respiratory: Negative for shortness of breath and wheezing.    Gastrointestinal: Negative for nausea and vomiting.   Neurological: Negative for light-headedness.     Objective:     Vital Signs (Most Recent):  Temp: 97.9 °F (36.6 °C) (08/27/20 0733)  Pulse: 103 (08/27/20 0754)  Resp: 20 (08/27/20 0733)  BP: 134/81 (08/27/20 0733)  SpO2: 95 % (08/27/20 0733) Vital Signs (24h Range):  Temp:  [96.9 °F (36.1 °C)-98.3 °F (36.8 °C)] 97.9 °F (36.6 °C)  Pulse:  [] 103  Resp:  [17-20] 20  SpO2:  [95 %-99 %] 95 %  BP: (115-166)/() 134/81     Weight: 103.7 kg (228 lb 9.9 oz)  Body mass index is 31.9 kg/m².     SpO2: 95 %  O2 Device (Oxygen Therapy): room air      Intake/Output Summary (Last 24 hours) at 8/27/2020 1036  Last data filed at 8/27/2020 0900  Gross per 24 hour   Intake 1570 ml   Output 3700 ml   Net -2130 ml       Lines/Drains/Airways     Peripheral Intravenous Line                 Peripheral IV - Single Lumen 08/24/20 1536 20 G Left Antecubital 2 days                Physical Exam   Constitutional: He is oriented to person, place, and time. He appears well-developed. No distress.   HENT:   Head: Normocephalic and atraumatic.   Neck: No JVD present.   Cardiovascular: Normal rate, regular rhythm, normal heart sounds and intact distal pulses. Exam reveals no gallop and no friction rub.   No murmur heard.  Pulmonary/Chest: Effort normal. No respiratory distress. He has no wheezes. He has no rales.   Musculoskeletal:         General: Edema (1+ bilaterally to right above ankles. Significantly improved) present.   Neurological: He is oriented to person, place, and time.   Skin: Skin is warm and dry. He is not diaphoretic.   Nursing note and vitals reviewed.      Significant Labs:   CMP   Recent Labs   Lab 08/26/20  0512 08/27/20  0430    137   K  4.4 3.6    100   CO2 24 26   GLU 73 70   BUN 11 14   CREATININE 0.9 1.0   CALCIUM 9.3 9.0   PROT 5.9* 5.9*   ALBUMIN 3.2* 3.2*   BILITOT 1.8* 1.5*   ALKPHOS 51* 54*   AST 64* 53*   ALT 74* 65*   ANIONGAP 9 11   ESTGFRAFRICA >60.0 >60.0   EGFRNONAA >60.0 >60.0   , CBC   Recent Labs   Lab 08/26/20  0512 08/27/20  0430   WBC 12.25 10.66   HGB 15.9 15.7   HCT 51.8 50.9    259    and All pertinent lab results from the last 24 hours have been reviewed.    Significant Imaging:   Cardiac Cath:  · LVEDP (Pre): 34  · LVEF=15%  · Normal coronary arteries  · Non-ischemic cardiomyopathy  · Estimated blood loss: <50 mL

## 2020-08-27 NOTE — DISCHARGE INSTRUCTIONS
Followup to be arranged at [facility].   [phone]  [email or web address]

## 2020-08-27 NOTE — PLAN OF CARE
08/27/20 1143   Final Note   Assessment Type Final Discharge Note   Anticipated Discharge Disposition Home   Hospital Follow Up  Appt(s) scheduled? No   Discharge plans and expectations educations in teach back method with documentation complete? Yes     Pt to d/c home. [facility] will not allow 3rd party schedule appointments for new patients. Pt must call himself. CM to bedside and instructed patient to call TODAY to schedule a hospital follow up ASAP.  Number and address on face sheet. He understands and stated he would call today.    Julie Haase RN  Case Management [phone]

## 2020-08-27 NOTE — PROGRESS NOTES
[facility]  Cardiology  Progress Note    Patient Name: Dev Sweeney  MRN: [id number]  Admission Date: 8/24/2020  Hospital Length of Stay: 0 days  Code Status: Full Code   Attending Physician: Tiffani Floyd MD   Primary Care Physician: Loki Alba DO  Expected Discharge Date: 8/27/2020  Principal Problem:New onset of congestive heart failure    Subjective:     Hospital Course:   No notes on file    Interval History: No acute events. Great UOP.    Review of Systems   Cardiovascular: Positive for leg swelling. Negative for chest pain, orthopnea and syncope.   Respiratory: Negative for shortness of breath and wheezing.    Gastrointestinal: Negative for nausea and vomiting.   Neurological: Negative for light-headedness.     Objective:     Vital Signs (Most Recent):  Temp: 97.9 °F (36.6 °C) (08/27/20 0733)  Pulse: 103 (08/27/20 0754)  Resp: 20 (08/27/20 0733)  BP: 134/81 (08/27/20 0733)  SpO2: 95 % (08/27/20 0733) Vital Signs (24h Range):  Temp:  [96.9 °F (36.1 °C)-98.3 °F (36.8 °C)] 97.9 °F (36.6 °C)  Pulse:  [] 103  Resp:  [17-20] 20  SpO2:  [95 %-99 %] 95 %  BP: (115-166)/() 134/81     Weight: 103.7 kg (228 lb 9.9 oz)  Body mass index is 31.9 kg/m².     SpO2: 95 %  O2 Device (Oxygen Therapy): room air      Intake/Output Summary (Last 24 hours) at 8/27/2020 1036  Last data filed at 8/27/2020 0900  Gross per 24 hour   Intake 1570 ml   Output 3700 ml   Net -2130 ml       Lines/Drains/Airways     Peripheral Intravenous Line                 Peripheral IV - Single Lumen 08/24/20 1536 20 G Left Antecubital 2 days                Physical Exam   Constitutional: He is oriented to person, place, and time. He appears well-developed. No distress.   HENT:   Head: Normocephalic and atraumatic.   Neck: No JVD present.   Cardiovascular: Normal rate, regular rhythm, normal heart sounds and intact distal pulses. Exam reveals no gallop and no friction rub.   No murmur  heard.  Pulmonary/Chest: Effort normal. No respiratory distress. He has no wheezes. He has no rales.   Musculoskeletal:         General: Edema (1+ bilaterally to right above ankles. Significantly improved) present.   Neurological: He is oriented to person, place, and time.   Skin: Skin is warm and dry. He is not diaphoretic.   Nursing note and vitals reviewed.      Significant Labs:   CMP   Recent Labs   Lab 08/26/20  0512 08/27/20  0430    137   K 4.4 3.6    100   CO2 24 26   GLU 73 70   BUN 11 14   CREATININE 0.9 1.0   CALCIUM 9.3 9.0   PROT 5.9* 5.9*   ALBUMIN 3.2* 3.2*   BILITOT 1.8* 1.5*   ALKPHOS 51* 54*   AST 64* 53*   ALT 74* 65*   ANIONGAP 9 11   ESTGFRAFRICA >60.0 >60.0   EGFRNONAA >60.0 >60.0   , CBC   Recent Labs   Lab 08/26/20 0512 08/27/20  0430   WBC 12.25 10.66   HGB 15.9 15.7   HCT 51.8 50.9    259    and All pertinent lab results from the last 24 hours have been reviewed.    Significant Imaging:   Cardiac Cath:  · LVEDP (Pre): 34  · LVEF=15%  · Normal coronary arteries  · Non-ischemic cardiomyopathy  · Estimated blood loss: <50 mL    Assessment and Plan:     Brief HPI: Below    * New onset of congestive heart failure  Patient is a 45 year old male with no significant PMHx who presents with 1 month of worsening SOB, orthopnea, lower extremity swelling. Has been unable to lie flat to sleep, sitting almost upright in a chair at night. Patient denies any chest pain. Reports seeing PCP recently for SOB was prescribed azithromycin and has been taking that. Has been taking crestor 10 mg for cholesterol. Patient reports taking testosterone weekly for recreational purposes. Endorses heavy drinking periodically, as much as a bottle of whiskey in a day on some days. Quit tobacco 1 month ago, 20 pack-year history.    New onset HF with EF of 10% of unknown cause. Likely NICM 2/2 to EtOH, less likely CAD.  Discussed with patient the potential role of EtOH and benefits of abstaining  Patient  verbalizes understanding and is willing to quit  - TTE demonstrates: EF 10%, eccentric LVH, LVIDD 6.8 cm, GIII LVDD, mod-to-sev RV dysfunction, CVP 15  - EKG: Sinus tachycardia with possible left atrial enlargement and nonspecific T waves changes  - Troponin 0.023 -> 0.029 -> 0.042  -     -- Fluid restriction at 1500 cc with strict I/Os and daily STANDING weights  -- Continue diuresis, good UOP  -- Check Electrolytes, keep Mg >2 and K >4  -- Ambulate as tolerated    Cardiac cath negative --> NICM  HF stable  -- Continue GDMT  -- Discontinue ASA  -- Crestor 10 mg qd  -- Recommend increasing coreg to 12.5 mg BID  -- Lasix 40 mg qd  -- Continue losartan  -- Do not recommend event monitor at this time but was considered initially to monitor for A.Fib    -- BNP, CMP, Mg in 2 weeks  -- Follow up in a cardiology clinic  -- Repeat ECHO in 12 weeks  -- Recommend CAROLYNN sleep study outpatient    Please reach out to cardiology with any questions or concerns.      VTE Risk Mitigation (From admission, onward)         Ordered     heparin infusion 1,000 units/500 ml in 0.9% NaCl (pressure line flush)  Intra-op continuous PRN      08/26/20 1207     IP VTE LOW RISK PATIENT  Once      08/24/20 1820     Place sequential compression device  Until discontinued      08/24/20 1819                Steven Jacobs MD  Cardiology  [facility]

## 2020-08-27 NOTE — PLAN OF CARE
POC reviewed with patient. VSS. Patient free of injuries and falls. Patient has no c/o pain or discomfort. Telemetry monitor showing sinus tachycardia. Patient had LHC today. RW puncture site CDI with gauze and tegaderm in place. Patient transitioned to PO 40 mg lasix tonight for first dose. Enforcing 1500 fluid restriction and monitoring UOP. All questions were addressed. WCTM.

## 2020-08-29 NOTE — DISCHARGE SUMMARY
DISCHARGE SUMMARY  Hospital Medicine    Team: [facility]OSP MED D    Patient Name: Dev Sweeney  YOB: 1975    Admit Date: 8/24/2020    Discharge Date: 08/27/2020    Discharge Attending Physician: Tiffani Floyd          Diagnoses:  Active Hospital Problems    Diagnosis  POA    *New onset of congestive heart failure [I50.9]  Yes    Hepatopathy [K76.9]  Yes    Hypercholesteremia [E78.00]  Yes    Obesity (BMI 30-39.9) [E66.9]  Yes    Gastroesophageal reflux disease [K21.9]  Yes      Resolved Hospital Problems   No resolved problems to display.       Discharged Condition: admit problems have stabilized       HOSPITAL COURSE:      Initial Presentation:    Mr. Dev Sweeney is a 45 y.o. male who presented to [facility] on 8/24/2020 with new onset CHF. 2D Echo revealed EF of 10%. Cardiology consulted for ICD vs lifevest and ischemic workup while inpatient. IV lasix  Transitioned to PO lasix on 8/26/20 and interventional cardiology consulted for [facility] as unable to proceed with NM stress test    Course of Principle Problem for Admission:    New Onset CHF  · Source unclear but suspect alcohol usage  · CHF Pathway initiated  · BNP 822 and CXR with pulmonary edema on admission  · IV diuresis with Lasix 20mg IV BID in hospital with good response- lasix 40 mg daily on discharge.  · Dry weight 220.  Admit weight 243.  · Will need BB and entresto on DC  2D Echo shows EF of 10%, cardiology consulted [facility] without obstructive disease recs to continue medical management. Cardiology team did not feel that aicd/life vest or loop recorder needed at this time.      Hepatopathy  · Elevated AST, ALT and Bili likely 2/2 hepatic congestion for CHF and volume overload  · Improving with diuresis     CONSULTS: cardiology       Last CBC/BMP/HgbA1c (if applicable):  Recent Results (from the past 336 hour(s))   CBC auto differential    Collection Time: 08/27/20  4:30 AM   Result Value Ref Range    WBC 10.66 3.90 - 12.70 K/uL     Hemoglobin 15.7 14.0 - 18.0 g/dL    Hematocrit 50.9 40.0 - 54.0 %    Platelets 259 150 - 350 K/uL   CBC auto differential    Collection Time: 08/26/20  5:12 AM   Result Value Ref Range    WBC 12.25 3.90 - 12.70 K/uL    Hemoglobin 15.9 14.0 - 18.0 g/dL    Hematocrit 51.8 40.0 - 54.0 %    Platelets 255 150 - 350 K/uL   CBC auto differential    Collection Time: 08/24/20  1:49 PM   Result Value Ref Range    WBC 12.43 3.90 - 12.70 K/uL    Hemoglobin 14.7 14.0 - 18.0 g/dL    Hematocrit 48.3 40.0 - 54.0 %    Platelets 309 150 - 350 K/uL     Recent Results (from the past 336 hour(s))   Basic metabolic panel    Collection Time: 08/25/20  4:38 AM   Result Value Ref Range    Sodium 141 136 - 145 mmol/L    Potassium 4.1 3.5 - 5.1 mmol/L    Chloride 106 95 - 110 mmol/L    CO2 24 23 - 29 mmol/L    BUN, Bld 9 6 - 20 mg/dL    Creatinine 0.9 0.5 - 1.4 mg/dL    Calcium 9.4 8.7 - 10.5 mg/dL    Anion Gap 11 8 - 16 mmol/L     Lab Results   Component Value Date    HGBA1C 4.9 08/24/2020     Disposition:  Home       Future Scheduled Appointments:  No future appointments.    Follow-up Plans from This Hospitalization:  Patient to follow with cardiology team at [facility].    Discharge Medication List:        Dev Sweeney   Home Medication Instructions MULUGETA:[id number]    Printed on:08/29/20 6699   Medication Information                      albuterol (PROVENTIL) 2.5 mg /3 mL (0.083 %) nebulizer solution  Take 3 mLs (2.5 mg total) by nebulization every 6 (six) hours as needed for Wheezing. Rescue             albuterol (PROVENTIL/VENTOLIN HFA) 90 mcg/actuation inhaler  Inhale 2 puffs into the lungs every 6 (six) hours as needed for Wheezing or Shortness of Breath.             aspirin 81 MG Chew  Take 1 tablet (81 mg total) by mouth once daily.             carvediloL (COREG) 6.25 MG tablet  Take 1 tablet (6.25 mg total) by mouth 2 (two) times daily.             furosemide (LASIX) 40 MG tablet  Take 1 tablet (40 mg total) by mouth  once daily.             HYDROcodone-acetaminophen (NORCO)  mg per tablet               levocetirizine (XYZAL) 5 MG tablet  Take 1 tablet (5 mg total) by mouth every evening.             losartan (COZAAR) 25 MG tablet  Take 1 tablet (25 mg total) by mouth once daily.             omeprazole (PRILOSEC) 40 MG capsule  TAKE 1 CAPSULE BY MOUTH EVERY DAY IN THE MORNING             rosuvastatin (CRESTOR) 10 MG tablet  Take 1 tablet (10 mg total) by mouth once daily.             traZODone (DESYREL) 100 MG tablet  Take 1 tablet (100 mg total) by mouth nightly as needed for Insomnia.                 Patient Instructions:  No discharge procedures on file.    Signing Physician:  Tiffani Floyd MD

## 2021-01-13 RX ORDER — ALBUTEROL SULFATE 90 UG/1
AEROSOL, METERED RESPIRATORY (INHALATION)
Qty: 54 G | Refills: 0 | Status: SHIPPED | OUTPATIENT
Start: 2021-01-13 | End: 2022-05-26 | Stop reason: SDUPTHER

## 2021-03-22 ENCOUNTER — PATIENT MESSAGE (OUTPATIENT)
Dept: INTERNAL MEDICINE | Facility: CLINIC | Age: 46
End: 2021-03-22

## 2021-03-24 RX ORDER — OMEPRAZOLE 40 MG/1
CAPSULE, DELAYED RELEASE ORAL
Qty: 90 CAPSULE | Refills: 0 | Status: SHIPPED | OUTPATIENT
Start: 2021-03-24 | End: 2021-06-17

## 2021-04-05 ENCOUNTER — PATIENT MESSAGE (OUTPATIENT)
Dept: ADMINISTRATIVE | Facility: HOSPITAL | Age: 46
End: 2021-04-05

## 2021-04-12 ENCOUNTER — PATIENT MESSAGE (OUTPATIENT)
Dept: RESEARCH | Facility: HOSPITAL | Age: 46
End: 2021-04-12

## 2021-04-12 RX ORDER — ROSUVASTATIN CALCIUM 10 MG/1
TABLET, COATED ORAL
Qty: 90 TABLET | Refills: 1 | Status: SHIPPED | OUTPATIENT
Start: 2021-04-12 | End: 2021-10-14 | Stop reason: SDUPTHER

## 2021-04-16 ENCOUNTER — OCCUPATIONAL HEALTH (OUTPATIENT)
Dept: URGENT CARE | Facility: CLINIC | Age: 46
End: 2021-04-16
Payer: OTHER GOVERNMENT

## 2021-04-16 DIAGNOSIS — Z02.1 PRE-EMPLOYMENT EXAMINATION: Primary | ICD-10-CM

## 2021-04-16 PROCEDURE — 80305 DRUG TEST PRSMV DIR OPT OBS: CPT | Mod: S$GLB,,, | Performed by: EMERGENCY MEDICINE

## 2021-04-16 PROCEDURE — 99499 UNLISTED E&M SERVICE: CPT | Mod: S$GLB,,, | Performed by: EMERGENCY MEDICINE

## 2021-04-16 PROCEDURE — 92283 EXTND COLOR VISION XM: CPT | Mod: S$GLB,,, | Performed by: EMERGENCY MEDICINE

## 2021-04-16 PROCEDURE — 92552 PURE TONE AUDIOMETRY AIR: CPT | Mod: S$GLB,,, | Performed by: EMERGENCY MEDICINE

## 2021-04-16 PROCEDURE — 99499 PHYSICAL, BASIC COMPLEXITY: ICD-10-PCS | Mod: S$GLB,,, | Performed by: EMERGENCY MEDICINE

## 2021-04-16 PROCEDURE — 80305 OOH COLLECTION ONLY DRUG SCREEN: ICD-10-PCS | Mod: S$GLB,,, | Performed by: EMERGENCY MEDICINE

## 2021-04-16 PROCEDURE — 92552 AUDIOGRAM OCC MED: ICD-10-PCS | Mod: S$GLB,,, | Performed by: EMERGENCY MEDICINE

## 2021-04-16 PROCEDURE — 92283 PR COLOR VISION EXAMINATION: ICD-10-PCS | Mod: S$GLB,,, | Performed by: EMERGENCY MEDICINE

## 2021-04-16 RX ORDER — CARVEDILOL 12.5 MG/1
12.5 TABLET ORAL 2 TIMES DAILY
COMMUNITY
Start: 2021-02-25 | End: 2021-10-13

## 2021-04-16 RX ORDER — LOSARTAN POTASSIUM 100 MG/1
100 TABLET ORAL DAILY
COMMUNITY
Start: 2021-02-25 | End: 2021-10-13

## 2021-04-16 RX ORDER — LANOLIN ALCOHOL/MO/W.PET/CERES
1 CREAM (GRAM) TOPICAL DAILY
COMMUNITY
Start: 2021-02-18

## 2021-07-06 ENCOUNTER — PATIENT MESSAGE (OUTPATIENT)
Dept: ADMINISTRATIVE | Facility: HOSPITAL | Age: 46
End: 2021-07-06

## 2021-08-03 ENCOUNTER — TELEPHONE (OUTPATIENT)
Dept: INTERNAL MEDICINE | Facility: CLINIC | Age: 46
End: 2021-08-03

## 2021-08-03 DIAGNOSIS — Z00.00 ROUTINE GENERAL MEDICAL EXAMINATION AT A HEALTH CARE FACILITY: Primary | ICD-10-CM

## 2021-09-21 ENCOUNTER — PATIENT MESSAGE (OUTPATIENT)
Dept: INTERNAL MEDICINE | Facility: CLINIC | Age: 46
End: 2021-09-21

## 2021-09-21 DIAGNOSIS — Z00.00 ANNUAL PHYSICAL EXAM: Primary | ICD-10-CM

## 2021-09-22 ENCOUNTER — PATIENT MESSAGE (OUTPATIENT)
Dept: INTERNAL MEDICINE | Facility: CLINIC | Age: 46
End: 2021-09-22

## 2021-10-06 RX ORDER — OMEPRAZOLE 40 MG/1
CAPSULE, DELAYED RELEASE ORAL
Qty: 90 CAPSULE | Refills: 1 | Status: SHIPPED | OUTPATIENT
Start: 2021-10-06 | End: 2022-03-13

## 2021-10-08 ENCOUNTER — LAB VISIT (OUTPATIENT)
Dept: LAB | Facility: HOSPITAL | Age: 46
End: 2021-10-08
Attending: INTERNAL MEDICINE
Payer: COMMERCIAL

## 2021-10-08 DIAGNOSIS — Z00.00 ROUTINE GENERAL MEDICAL EXAMINATION AT A HEALTH CARE FACILITY: ICD-10-CM

## 2021-10-08 DIAGNOSIS — Z00.00 ANNUAL PHYSICAL EXAM: ICD-10-CM

## 2021-10-08 LAB
ALBUMIN SERPL BCP-MCNC: 4.3 G/DL (ref 3.5–5.2)
ALP SERPL-CCNC: 54 U/L (ref 55–135)
ALT SERPL W/O P-5'-P-CCNC: 39 U/L (ref 10–44)
ANION GAP SERPL CALC-SCNC: 10 MMOL/L (ref 8–16)
AST SERPL-CCNC: 32 U/L (ref 10–40)
BASOPHILS # BLD AUTO: 0.1 K/UL (ref 0–0.2)
BASOPHILS NFR BLD: 1.2 % (ref 0–1.9)
BILIRUB SERPL-MCNC: 1 MG/DL (ref 0.1–1)
BUN SERPL-MCNC: 13 MG/DL (ref 6–20)
CALCIUM SERPL-MCNC: 10 MG/DL (ref 8.7–10.5)
CHLORIDE SERPL-SCNC: 98 MMOL/L (ref 95–110)
CHOLEST SERPL-MCNC: 177 MG/DL (ref 120–199)
CHOLEST/HDLC SERPL: 4.2 {RATIO} (ref 2–5)
CO2 SERPL-SCNC: 26 MMOL/L (ref 23–29)
COMPLEXED PSA SERPL-MCNC: 0.62 NG/ML (ref 0–4)
CREAT SERPL-MCNC: 1 MG/DL (ref 0.5–1.4)
DIFFERENTIAL METHOD: ABNORMAL
EOSINOPHIL # BLD AUTO: 0.2 K/UL (ref 0–0.5)
EOSINOPHIL NFR BLD: 1.9 % (ref 0–8)
ERYTHROCYTE [DISTWIDTH] IN BLOOD BY AUTOMATED COUNT: 19.9 % (ref 11.5–14.5)
EST. GFR  (AFRICAN AMERICAN): >60 ML/MIN/1.73 M^2
EST. GFR  (NON AFRICAN AMERICAN): >60 ML/MIN/1.73 M^2
GLUCOSE SERPL-MCNC: 88 MG/DL (ref 70–110)
HCT VFR BLD AUTO: 46.9 % (ref 40–54)
HDLC SERPL-MCNC: 42 MG/DL (ref 40–75)
HDLC SERPL: 23.7 % (ref 20–50)
HGB BLD-MCNC: 14.1 G/DL (ref 14–18)
IMM GRANULOCYTES # BLD AUTO: 0.02 K/UL (ref 0–0.04)
IMM GRANULOCYTES NFR BLD AUTO: 0.2 % (ref 0–0.5)
LDLC SERPL CALC-MCNC: 116 MG/DL (ref 63–159)
LYMPHOCYTES # BLD AUTO: 1.2 K/UL (ref 1–4.8)
LYMPHOCYTES NFR BLD: 14.4 % (ref 18–48)
MCH RBC QN AUTO: 24.6 PG (ref 27–31)
MCHC RBC AUTO-ENTMCNC: 30.1 G/DL (ref 32–36)
MCV RBC AUTO: 82 FL (ref 82–98)
MONOCYTES # BLD AUTO: 0.9 K/UL (ref 0.3–1)
MONOCYTES NFR BLD: 11.2 % (ref 4–15)
NEUTROPHILS # BLD AUTO: 5.8 K/UL (ref 1.8–7.7)
NEUTROPHILS NFR BLD: 71.1 % (ref 38–73)
NONHDLC SERPL-MCNC: 135 MG/DL
NRBC BLD-RTO: 0 /100 WBC
PLATELET # BLD AUTO: 301 K/UL (ref 150–450)
PMV BLD AUTO: 9.8 FL (ref 9.2–12.9)
POTASSIUM SERPL-SCNC: 4 MMOL/L (ref 3.5–5.1)
PROT SERPL-MCNC: 7.7 G/DL (ref 6–8.4)
RBC # BLD AUTO: 5.74 M/UL (ref 4.6–6.2)
SODIUM SERPL-SCNC: 134 MMOL/L (ref 136–145)
TESTOST SERPL-MCNC: 102 NG/DL (ref 304–1227)
TRIGL SERPL-MCNC: 95 MG/DL (ref 30–150)
TSH SERPL DL<=0.005 MIU/L-ACNC: 0.88 UIU/ML (ref 0.4–4)
WBC # BLD AUTO: 8.1 K/UL (ref 3.9–12.7)

## 2021-10-08 PROCEDURE — 84403 ASSAY OF TOTAL TESTOSTERONE: CPT | Performed by: INTERNAL MEDICINE

## 2021-10-08 PROCEDURE — 84443 ASSAY THYROID STIM HORMONE: CPT | Performed by: INTERNAL MEDICINE

## 2021-10-08 PROCEDURE — 80061 LIPID PANEL: CPT | Performed by: INTERNAL MEDICINE

## 2021-10-08 PROCEDURE — 85025 COMPLETE CBC W/AUTO DIFF WBC: CPT | Performed by: INTERNAL MEDICINE

## 2021-10-08 PROCEDURE — 80053 COMPREHEN METABOLIC PANEL: CPT | Performed by: INTERNAL MEDICINE

## 2021-10-08 PROCEDURE — 84153 ASSAY OF PSA TOTAL: CPT | Performed by: INTERNAL MEDICINE

## 2021-10-08 PROCEDURE — 36415 COLL VENOUS BLD VENIPUNCTURE: CPT | Mod: PO | Performed by: INTERNAL MEDICINE

## 2021-10-13 ENCOUNTER — OFFICE VISIT (OUTPATIENT)
Dept: INTERNAL MEDICINE | Facility: CLINIC | Age: 46
End: 2021-10-13
Payer: COMMERCIAL

## 2021-10-13 VITALS
WEIGHT: 231.06 LBS | HEIGHT: 72 IN | BODY MASS INDEX: 31.3 KG/M2 | TEMPERATURE: 98 F | DIASTOLIC BLOOD PRESSURE: 100 MMHG | RESPIRATION RATE: 18 BRPM | OXYGEN SATURATION: 97 % | SYSTOLIC BLOOD PRESSURE: 140 MMHG | HEART RATE: 93 BPM

## 2021-10-13 DIAGNOSIS — E29.1 HYPOGONADISM MALE: ICD-10-CM

## 2021-10-13 DIAGNOSIS — I10 HYPERTENSION, UNSPECIFIED TYPE: ICD-10-CM

## 2021-10-13 DIAGNOSIS — I50.9 NEW ONSET OF CONGESTIVE HEART FAILURE: ICD-10-CM

## 2021-10-13 DIAGNOSIS — G47.00 INSOMNIA, UNSPECIFIED TYPE: ICD-10-CM

## 2021-10-13 DIAGNOSIS — F17.200 TOBACCO DEPENDENCE: ICD-10-CM

## 2021-10-13 DIAGNOSIS — Z00.00 ANNUAL PHYSICAL EXAM: Primary | ICD-10-CM

## 2021-10-13 DIAGNOSIS — E66.9 OBESITY (BMI 30-39.9): ICD-10-CM

## 2021-10-13 DIAGNOSIS — E78.00 HYPERCHOLESTEREMIA: ICD-10-CM

## 2021-10-13 PROCEDURE — 99396 PR PREVENTIVE VISIT,EST,40-64: ICD-10-PCS | Mod: S$GLB,,, | Performed by: INTERNAL MEDICINE

## 2021-10-13 PROCEDURE — 99999 PR PBB SHADOW E&M-EST. PATIENT-LVL III: ICD-10-PCS | Mod: PBBFAC,,, | Performed by: INTERNAL MEDICINE

## 2021-10-13 PROCEDURE — 99999 PR PBB SHADOW E&M-EST. PATIENT-LVL III: CPT | Mod: PBBFAC,,, | Performed by: INTERNAL MEDICINE

## 2021-10-13 PROCEDURE — 99396 PREV VISIT EST AGE 40-64: CPT | Mod: S$GLB,,, | Performed by: INTERNAL MEDICINE

## 2021-10-13 RX ORDER — AMITRIPTYLINE HYDROCHLORIDE 50 MG/1
50 TABLET, FILM COATED ORAL NIGHTLY
Qty: 90 TABLET | Refills: 3 | Status: SHIPPED | OUTPATIENT
Start: 2021-10-13 | End: 2022-01-10

## 2021-10-13 RX ORDER — CARVEDILOL 25 MG/1
25 TABLET ORAL 2 TIMES DAILY WITH MEALS
Qty: 180 TABLET | Refills: 1 | Status: SHIPPED | OUTPATIENT
Start: 2021-10-13 | End: 2022-03-13

## 2021-10-13 RX ORDER — SACUBITRIL AND VALSARTAN 49; 51 MG/1; MG/1
1 TABLET, FILM COATED ORAL 2 TIMES DAILY
COMMUNITY
Start: 2021-10-01 | End: 2022-05-26 | Stop reason: SDUPTHER

## 2021-10-14 ENCOUNTER — PATIENT MESSAGE (OUTPATIENT)
Dept: INTERNAL MEDICINE | Facility: CLINIC | Age: 46
End: 2021-10-14
Payer: COMMERCIAL

## 2021-10-14 RX ORDER — ROSUVASTATIN CALCIUM 10 MG/1
10 TABLET, COATED ORAL DAILY
Qty: 90 TABLET | Refills: 3 | Status: SHIPPED | OUTPATIENT
Start: 2021-10-14 | End: 2022-10-25

## 2021-10-18 ENCOUNTER — PATIENT OUTREACH (OUTPATIENT)
Dept: ADMINISTRATIVE | Facility: OTHER | Age: 46
End: 2021-10-18

## 2021-10-19 ENCOUNTER — OFFICE VISIT (OUTPATIENT)
Dept: UROLOGY | Facility: CLINIC | Age: 46
End: 2021-10-19
Payer: COMMERCIAL

## 2021-10-19 ENCOUNTER — LAB VISIT (OUTPATIENT)
Dept: LAB | Facility: HOSPITAL | Age: 46
End: 2021-10-19
Attending: UROLOGY
Payer: COMMERCIAL

## 2021-10-19 VITALS — WEIGHT: 231 LBS | HEIGHT: 72 IN | BODY MASS INDEX: 31.29 KG/M2

## 2021-10-19 DIAGNOSIS — E29.1 HYPOGONADISM MALE: Primary | ICD-10-CM

## 2021-10-19 DIAGNOSIS — E29.1 HYPOGONADISM MALE: ICD-10-CM

## 2021-10-19 LAB
FSH SERPL-ACNC: <0.1 MIU/ML (ref 0.95–11.95)
LH SERPL-ACNC: <0.2 MIU/ML (ref 0.6–12.1)
PROLACTIN SERPL IA-MCNC: 11.1 NG/ML (ref 3.5–19.4)

## 2021-10-19 PROCEDURE — 99999 PR PBB SHADOW E&M-EST. PATIENT-LVL IV: ICD-10-PCS | Mod: PBBFAC,,, | Performed by: UROLOGY

## 2021-10-19 PROCEDURE — 99999 PR PBB SHADOW E&M-EST. PATIENT-LVL IV: CPT | Mod: PBBFAC,,, | Performed by: UROLOGY

## 2021-10-19 PROCEDURE — 84146 ASSAY OF PROLACTIN: CPT | Performed by: UROLOGY

## 2021-10-19 PROCEDURE — 83001 ASSAY OF GONADOTROPIN (FSH): CPT | Performed by: UROLOGY

## 2021-10-19 PROCEDURE — 36415 COLL VENOUS BLD VENIPUNCTURE: CPT | Performed by: UROLOGY

## 2021-10-19 PROCEDURE — 99203 PR OFFICE/OUTPT VISIT, NEW, LEVL III, 30-44 MIN: ICD-10-PCS | Mod: S$GLB,,, | Performed by: UROLOGY

## 2021-10-19 PROCEDURE — 83002 ASSAY OF GONADOTROPIN (LH): CPT | Performed by: UROLOGY

## 2021-10-19 PROCEDURE — 99203 OFFICE O/P NEW LOW 30 MIN: CPT | Mod: S$GLB,,, | Performed by: UROLOGY

## 2021-10-19 PROCEDURE — 84403 ASSAY OF TOTAL TESTOSTERONE: CPT | Performed by: UROLOGY

## 2021-10-26 LAB
ALBUMIN SERPL-MCNC: 4.2 G/DL (ref 3.6–5.1)
SHBG SERPL-SCNC: 8 NMOL/L (ref 10–50)
TESTOST FREE SERPL-MCNC: 1035.3 PG/ML (ref 46–224)
TESTOST SERPL-MCNC: 2355 NG/DL (ref 250–1100)
TESTOSTERONE.FREE+WB SERPL-MCNC: 1994.1 NG/DL (ref 110–575)

## 2021-10-28 ENCOUNTER — OFFICE VISIT (OUTPATIENT)
Dept: UROLOGY | Facility: CLINIC | Age: 46
End: 2021-10-28
Payer: COMMERCIAL

## 2021-10-28 DIAGNOSIS — E29.1 HYPOGONADISM MALE: Primary | ICD-10-CM

## 2021-10-28 PROCEDURE — 99214 PR OFFICE/OUTPT VISIT, EST, LEVL IV, 30-39 MIN: ICD-10-PCS | Mod: 95,,, | Performed by: UROLOGY

## 2021-10-28 PROCEDURE — 99214 OFFICE O/P EST MOD 30 MIN: CPT | Mod: 95,,, | Performed by: UROLOGY

## 2021-11-18 ENCOUNTER — LAB VISIT (OUTPATIENT)
Dept: LAB | Facility: HOSPITAL | Age: 46
End: 2021-11-18
Attending: UROLOGY
Payer: COMMERCIAL

## 2021-11-18 DIAGNOSIS — E29.1 HYPOGONADISM MALE: ICD-10-CM

## 2021-11-18 PROCEDURE — 84403 ASSAY OF TOTAL TESTOSTERONE: CPT | Performed by: UROLOGY

## 2021-11-18 PROCEDURE — 36415 COLL VENOUS BLD VENIPUNCTURE: CPT | Performed by: UROLOGY

## 2021-11-19 ENCOUNTER — PATIENT OUTREACH (OUTPATIENT)
Dept: ADMINISTRATIVE | Facility: OTHER | Age: 46
End: 2021-11-19
Payer: COMMERCIAL

## 2021-11-22 ENCOUNTER — OFFICE VISIT (OUTPATIENT)
Dept: UROLOGY | Facility: CLINIC | Age: 46
End: 2021-11-22
Payer: COMMERCIAL

## 2021-11-22 VITALS — BODY MASS INDEX: 31.29 KG/M2 | HEIGHT: 72 IN | WEIGHT: 231 LBS

## 2021-11-22 DIAGNOSIS — E29.1 HYPOGONADISM MALE: Primary | ICD-10-CM

## 2021-11-22 LAB
ALBUMIN SERPL-MCNC: 4.7 G/DL (ref 3.6–5.1)
SHBG SERPL-SCNC: 15 NMOL/L (ref 10–50)
TESTOST FREE SERPL-MCNC: 31.3 PG/ML (ref 46–224)
TESTOST SERPL-MCNC: 152 NG/DL (ref 250–1100)
TESTOSTERONE.FREE+WB SERPL-MCNC: 67.2 NG/DL (ref 110–575)

## 2021-11-22 PROCEDURE — 99999 PR PBB SHADOW E&M-EST. PATIENT-LVL III: ICD-10-PCS | Mod: PBBFAC,,, | Performed by: UROLOGY

## 2021-11-22 PROCEDURE — 99999 PR PBB SHADOW E&M-EST. PATIENT-LVL III: CPT | Mod: PBBFAC,,, | Performed by: UROLOGY

## 2021-11-22 PROCEDURE — 99214 OFFICE O/P EST MOD 30 MIN: CPT | Mod: S$GLB,,, | Performed by: UROLOGY

## 2021-11-22 PROCEDURE — 99214 PR OFFICE/OUTPT VISIT, EST, LEVL IV, 30-39 MIN: ICD-10-PCS | Mod: S$GLB,,, | Performed by: UROLOGY

## 2021-11-22 RX ORDER — SYRINGE W-NEEDLE,DISPOSAB,3 ML 25GX5/8"
1 SYRINGE, EMPTY DISPOSABLE MISCELLANEOUS
Qty: 100 EACH | Refills: 11 | Status: SHIPPED | OUTPATIENT
Start: 2021-11-22

## 2021-11-22 RX ORDER — TESTOSTERONE CYPIONATE 200 MG/ML
400 INJECTION, SOLUTION INTRAMUSCULAR
Qty: 10 ML | Refills: 2 | Status: SHIPPED | OUTPATIENT
Start: 2021-11-22 | End: 2022-02-28 | Stop reason: SDUPTHER

## 2022-01-10 ENCOUNTER — PATIENT MESSAGE (OUTPATIENT)
Dept: INTERNAL MEDICINE | Facility: CLINIC | Age: 47
End: 2022-01-10

## 2022-01-10 ENCOUNTER — OFFICE VISIT (OUTPATIENT)
Dept: INTERNAL MEDICINE | Facility: CLINIC | Age: 47
End: 2022-01-10
Payer: COMMERCIAL

## 2022-01-10 VITALS
SYSTOLIC BLOOD PRESSURE: 122 MMHG | BODY MASS INDEX: 32.96 KG/M2 | HEART RATE: 78 BPM | TEMPERATURE: 98 F | RESPIRATION RATE: 18 BRPM | OXYGEN SATURATION: 98 % | HEIGHT: 71 IN | WEIGHT: 235.44 LBS | DIASTOLIC BLOOD PRESSURE: 80 MMHG

## 2022-01-10 DIAGNOSIS — G47.00 INSOMNIA, UNSPECIFIED TYPE: ICD-10-CM

## 2022-01-10 DIAGNOSIS — I50.42 CHRONIC COMBINED SYSTOLIC AND DIASTOLIC CONGESTIVE HEART FAILURE: ICD-10-CM

## 2022-01-10 DIAGNOSIS — I10 HYPERTENSION, UNSPECIFIED TYPE: Primary | ICD-10-CM

## 2022-01-10 PROBLEM — I50.9 NEW ONSET OF CONGESTIVE HEART FAILURE: Status: RESOLVED | Noted: 2020-08-24 | Resolved: 2022-01-10

## 2022-01-10 PROCEDURE — 4010F PR ACE/ARB THEARPY RXD/TAKEN: ICD-10-PCS | Mod: CPTII,S$GLB,, | Performed by: INTERNAL MEDICINE

## 2022-01-10 PROCEDURE — 3008F PR BODY MASS INDEX (BMI) DOCUMENTED: ICD-10-PCS | Mod: CPTII,S$GLB,, | Performed by: INTERNAL MEDICINE

## 2022-01-10 PROCEDURE — 99999 PR PBB SHADOW E&M-EST. PATIENT-LVL V: ICD-10-PCS | Mod: PBBFAC,,, | Performed by: INTERNAL MEDICINE

## 2022-01-10 PROCEDURE — 3079F DIAST BP 80-89 MM HG: CPT | Mod: CPTII,S$GLB,, | Performed by: INTERNAL MEDICINE

## 2022-01-10 PROCEDURE — 99214 PR OFFICE/OUTPT VISIT, EST, LEVL IV, 30-39 MIN: ICD-10-PCS | Mod: S$GLB,,, | Performed by: INTERNAL MEDICINE

## 2022-01-10 PROCEDURE — 4010F ACE/ARB THERAPY RXD/TAKEN: CPT | Mod: CPTII,S$GLB,, | Performed by: INTERNAL MEDICINE

## 2022-01-10 PROCEDURE — 1159F MED LIST DOCD IN RCRD: CPT | Mod: CPTII,S$GLB,, | Performed by: INTERNAL MEDICINE

## 2022-01-10 PROCEDURE — 3074F PR MOST RECENT SYSTOLIC BLOOD PRESSURE < 130 MM HG: ICD-10-PCS | Mod: CPTII,S$GLB,, | Performed by: INTERNAL MEDICINE

## 2022-01-10 PROCEDURE — 3074F SYST BP LT 130 MM HG: CPT | Mod: CPTII,S$GLB,, | Performed by: INTERNAL MEDICINE

## 2022-01-10 PROCEDURE — 1160F RVW MEDS BY RX/DR IN RCRD: CPT | Mod: CPTII,S$GLB,, | Performed by: INTERNAL MEDICINE

## 2022-01-10 PROCEDURE — 99999 PR PBB SHADOW E&M-EST. PATIENT-LVL V: CPT | Mod: PBBFAC,,, | Performed by: INTERNAL MEDICINE

## 2022-01-10 PROCEDURE — 99214 OFFICE O/P EST MOD 30 MIN: CPT | Mod: S$GLB,,, | Performed by: INTERNAL MEDICINE

## 2022-01-10 PROCEDURE — 3008F BODY MASS INDEX DOCD: CPT | Mod: CPTII,S$GLB,, | Performed by: INTERNAL MEDICINE

## 2022-01-10 PROCEDURE — 1160F PR REVIEW ALL MEDS BY PRESCRIBER/CLIN PHARMACIST DOCUMENTED: ICD-10-PCS | Mod: CPTII,S$GLB,, | Performed by: INTERNAL MEDICINE

## 2022-01-10 PROCEDURE — 1159F PR MEDICATION LIST DOCUMENTED IN MEDICAL RECORD: ICD-10-PCS | Mod: CPTII,S$GLB,, | Performed by: INTERNAL MEDICINE

## 2022-01-10 PROCEDURE — 3079F PR MOST RECENT DIASTOLIC BLOOD PRESSURE 80-89 MM HG: ICD-10-PCS | Mod: CPTII,S$GLB,, | Performed by: INTERNAL MEDICINE

## 2022-01-10 RX ORDER — LOSARTAN POTASSIUM 100 MG/1
TABLET ORAL
COMMUNITY
Start: 2021-11-11 | End: 2022-02-28

## 2022-01-10 RX ORDER — HYDROCODONE BITARTRATE AND ACETAMINOPHEN 10; 325 MG/1; MG/1
1 TABLET ORAL DAILY PRN
COMMUNITY
Start: 2022-01-02

## 2022-01-10 RX ORDER — CLONAZEPAM 0.5 MG/1
0.5 TABLET ORAL NIGHTLY PRN
Qty: 30 TABLET | Refills: 1 | Status: SHIPPED | OUTPATIENT
Start: 2022-01-10 | End: 2022-04-14

## 2022-01-10 NOTE — PROGRESS NOTES
Subjective:       Patient ID: Dev Sweeney is a 46 y.o. male.    Chief Complaint: Follow-up (HTN) and Discuss Covid (Pt did a at home Covid test was positive Dec 22)    HPI   Pt with CHF is here for f/u regarding HTN. His Coreg was increased at last visit and he continued on Losartan. BP readings at home are running normal. No med SE's.     Pt did not like the Elavil for insomnia as it was causing mood swings.   Review of Systems   Constitutional: Negative for activity change, appetite change, chills, diaphoresis, fatigue, fever and unexpected weight change.   HENT: Negative for postnasal drip, rhinorrhea, sinus pressure/congestion, sneezing, sore throat, trouble swallowing and voice change.    Respiratory: Negative for cough, shortness of breath and wheezing.    Cardiovascular: Negative for chest pain, palpitations and leg swelling.   Gastrointestinal: Negative for abdominal pain, blood in stool, constipation, diarrhea, nausea and vomiting.   Genitourinary: Negative for dysuria.   Musculoskeletal: Negative for arthralgias and myalgias.   Integumentary:  Negative for rash and wound.   Allergic/Immunologic: Negative for environmental allergies and food allergies.   Hematological: Negative for adenopathy. Does not bruise/bleed easily.   Psychiatric/Behavioral: Positive for sleep disturbance. Negative for self-injury and suicidal ideas.         Objective:      Physical Exam  Constitutional:       General: He is not in acute distress.     Appearance: He is well-developed and well-nourished. He is not diaphoretic.   HENT:      Head: Normocephalic and atraumatic.      Right Ear: External ear normal.      Left Ear: External ear normal.      Nose: Nose normal.      Mouth/Throat:      Mouth: Oropharynx is clear and moist.      Pharynx: No oropharyngeal exudate.   Eyes:      General: No scleral icterus.        Right eye: No discharge.         Left eye: No discharge.      Extraocular Movements: EOM normal.       Conjunctiva/sclera: Conjunctivae normal.      Pupils: Pupils are equal, round, and reactive to light.   Neck:      Vascular: No JVD.   Cardiovascular:      Rate and Rhythm: Normal rate and regular rhythm.      Heart sounds: Normal heart sounds. No murmur heard.      Pulmonary:      Effort: Pulmonary effort is normal. No respiratory distress.      Breath sounds: Normal breath sounds. No wheezing or rales.   Musculoskeletal:         General: No edema.      Cervical back: Normal range of motion and neck supple.   Lymphadenopathy:      Cervical: No cervical adenopathy.   Skin:     General: Skin is warm and dry.      Coloration: Skin is not pale.      Findings: No rash.   Neurological:      Mental Status: He is alert and oriented to person, place, and time.         Assessment:       Problem List Items Addressed This Visit        Cardiac/Vascular    Hypertension - Primary       Other    Insomnia      Other Visit Diagnoses     Chronic combined systolic and diastolic congestive heart failure        Relevant Orders    Ambulatory referral/consult to Cardiology          Plan:    HTN- stable on current meds     Insomnia- rx Klonipin 0.5 mg qHS PRN, d/c Elavil     CHF- no s/s of volume overload on exam   -referral to cardio

## 2022-01-11 ENCOUNTER — PATIENT MESSAGE (OUTPATIENT)
Dept: INTERNAL MEDICINE | Facility: CLINIC | Age: 47
End: 2022-01-11
Payer: COMMERCIAL

## 2022-02-01 RX ORDER — FUROSEMIDE 40 MG/1
40 TABLET ORAL DAILY
Qty: 90 TABLET | Refills: 3 | Status: SHIPPED | OUTPATIENT
Start: 2022-02-01 | End: 2022-11-29 | Stop reason: SDUPTHER

## 2022-02-09 ENCOUNTER — PATIENT MESSAGE (OUTPATIENT)
Dept: UROLOGY | Facility: CLINIC | Age: 47
End: 2022-02-09
Payer: COMMERCIAL

## 2022-02-13 ENCOUNTER — PATIENT MESSAGE (OUTPATIENT)
Dept: UROLOGY | Facility: CLINIC | Age: 47
End: 2022-02-13
Payer: COMMERCIAL

## 2022-02-16 ENCOUNTER — LAB VISIT (OUTPATIENT)
Dept: LAB | Facility: HOSPITAL | Age: 47
End: 2022-02-16
Attending: UROLOGY
Payer: COMMERCIAL

## 2022-02-16 ENCOUNTER — TELEPHONE (OUTPATIENT)
Dept: UROLOGY | Facility: CLINIC | Age: 47
End: 2022-02-16

## 2022-02-16 DIAGNOSIS — E29.1 HYPOGONADISM MALE: ICD-10-CM

## 2022-02-16 LAB
COMPLEXED PSA SERPL-MCNC: 0.3 NG/ML (ref 0–4)
TESTOST SERPL-MCNC: 111 NG/DL (ref 304–1227)

## 2022-02-16 PROCEDURE — 84403 ASSAY OF TOTAL TESTOSTERONE: CPT | Performed by: UROLOGY

## 2022-02-16 PROCEDURE — 84153 ASSAY OF PSA TOTAL: CPT | Performed by: UROLOGY

## 2022-02-16 PROCEDURE — 36415 COLL VENOUS BLD VENIPUNCTURE: CPT | Performed by: UROLOGY

## 2022-02-16 NOTE — TELEPHONE ENCOUNTER
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

## 2022-02-28 ENCOUNTER — OFFICE VISIT (OUTPATIENT)
Dept: UROLOGY | Facility: CLINIC | Age: 47
End: 2022-02-28

## 2022-02-28 DIAGNOSIS — E29.1 HYPOGONADISM MALE: Primary | ICD-10-CM

## 2022-02-28 PROCEDURE — 99213 OFFICE O/P EST LOW 20 MIN: CPT | Mod: 95,,, | Performed by: UROLOGY

## 2022-02-28 PROCEDURE — 99213 PR OFFICE/OUTPT VISIT, EST, LEVL III, 20-29 MIN: ICD-10-PCS | Mod: 95,,, | Performed by: UROLOGY

## 2022-02-28 RX ORDER — TESTOSTERONE CYPIONATE 200 MG/ML
300 INJECTION, SOLUTION INTRAMUSCULAR
Qty: 10 ML | Refills: 5 | Status: SHIPPED | OUTPATIENT
Start: 2022-02-28 | End: 2022-09-19

## 2022-02-28 NOTE — PROGRESS NOTES
Subjective:       Dev Sweeney is a 46 y.o. male who is an established patient who was referred by Dr. Loki Alba  for evaluation of low T.      Recent blood work with low testosterone. Also with significantly low T in chart from 2004. Reports decreased energy and low libido. Also with ED issues. Took Viagra in the past from online purchase that was helpful. Not working out as much as previously. Vasectomy in 2004.      PMH: CHF (EF 10% initially, recently reported to be 45%), HTN, smoker    10/21 T  - 102 (2004 - 106), PSA - 0.62   10/21 T - 2355, low FSH/LJ  11/21 T - 152, free 31.3  2/22 T - 111, PSA - 0.30 (6 days prior to next injection)     Repeat T check shows a drastically elevated testosterone (2355). LH and FSH low. Prolactin normal. He  reports taking exogenous testosterone from someone at the gym. Two doses in one week. He may have had some increased energy and increased sex drive noted in the last week.     Virtual visit. Started on TRT injections - 400mg q4wks. He has noted improvement of symptoms though notes low T symptoms return at 2 weeks.         The following portions of the patient's history were reviewed and updated as appropriate: allergies, current medications, past family history, past medical history, past social history, past surgical history and problem list.    Review of Systems  Twelve point review of systems completed. Pertinent positive and negatives listed in HPI.      Objective:    Vitals: There were no vitals taken for this visit.    Physical Exam - not done today - virtual visit  General: well developed, well nourished in no acute distress  Head: normocephalic, atraumatic  Neck: supple, trachea midline, no obvious enlargement of thyroid  HEENT: EOMI, mucus membranes moist, sclera anicteric, no hearing impairment  Lungs: symmetric expansion, non-labored breathing  Skin: no rashes or lesions  Neuro: alert and oriented x 3, no gross deficits  Psych: normal judgment  and insight, normal mood/affect and non-anxious  Genitourinary: (last visit)  Penis -  circumcised penis without plaques, lesions, or scarring.  Urethra - orthotopic location without stenosis.  Scrotum  - no lesions or rashes, no hydrocele or hernia.  Testes - descended bilaterally, symmetric without masses, non tender.  Epididymides - no cysts or lesions, no spermatocele, symmetric.   CORINNE: deferred      Lab Review   Urine analysis today in clinic shows no urine     Lab Results   Component Value Date    WBC 8.10 10/08/2021    HGB 14.1 10/08/2021    HCT 46.9 10/08/2021    MCV 82 10/08/2021     10/08/2021     Lab Results   Component Value Date    CREATININE 1.0 10/08/2021    BUN 13 10/08/2021     Lab Results   Component Value Date    PSA 0.62 10/08/2021     Lab Results   Component Value Date    PSADIAG 0.30 02/16/2022       Imaging  NA         Assessment/Plan:      1. Hypogonadism male    - Significantly low total T level   - Normal  exam   - Testosterone panel   - Prolactin, FSH, LH   - May need endocrine referral   - May need PDE5i for ED in addition to TRT     - T level now very elevated after taking exogenous testosterone. Cautioned on use.   - TRT started 11/21 - prefers self-injection.    - 400mg q28d to start. Wearing off too soon.    - Change dosing to 300mg q2wks.    - Recheck T level 6 months.        - Discussed hypogonadism, common symptoms, treatment options, and the risks and benefits of treatment.  His symptoms and blood tests support the diagnosis and therefore treatment is appropriate.   - Discussed the various risks associated with TRT, specifically a possible increase in risk of heart disease, MI, CVA, PE, DVT. Also discussed the issues related to testosterone replacement and prostate cancer. TRT does not increase his risk of developing cancer. Recommend annual CORINNE and PSA for PCa screening. TRT recommended to stop if abnormal CORINNE or elevated PSA.   - Treatment options reviewed: regular  injections, topical treatments including gels and creams, and implants such as TestoPel.  Risks and benefits of each were reviewed specifically T fluctuations with q2-4week injections and risk of transfer of medication to other with topical application.       [place]  The chief complaint leading to consultation is: low T    Visit type: audio only    Face to Face time with patient: 8min  15 minutes of total time spent on the encounter, which includes face to face time and non-face to face time preparing to see the patient (eg, review of tests), Obtaining and/or reviewing separately obtained history, Documenting clinical information in the electronic or other health record, Independently interpreting results (not separately reported) and communicating results to the patient/family/caregiver, or Care coordination (not separately reported).         Each patient to whom he or she provides medical services by telemedicine is:  (1) informed of the relationship between the physician and patient and the respective role of any other health care provider with respect to management of the patient; and (2) notified that he or she may decline to receive medical services by telemedicine and may withdraw from such care at any time.    Notes:

## 2022-04-14 RX ORDER — CLONAZEPAM 0.5 MG/1
0.5 TABLET ORAL NIGHTLY PRN
Qty: 30 TABLET | Refills: 0 | Status: SHIPPED | OUTPATIENT
Start: 2022-04-14 | End: 2022-05-26

## 2022-04-14 NOTE — TELEPHONE ENCOUNTER
No new care gaps identified.  Powered by [employer]. Reference number: [id number].   4/13/2022 7:15:42 PM CDT

## 2022-05-16 ENCOUNTER — TELEPHONE (OUTPATIENT)
Dept: GASTROENTEROLOGY | Facility: CLINIC | Age: 47
End: 2022-05-16
Payer: COMMERCIAL

## 2022-05-16 NOTE — TELEPHONE ENCOUNTER
----- Message from Dionne Mckee sent at 5/16/2022  3:05 PM CDT -----  Type: Requesting to speak with nurse         Who Called: PT  Regarding: pt is former [facility] pt- has hernia - cancelled procedure due to insurance- now has bcbs ( still waiting for it to verify ) - put info in comments in demographics just incase. Schedule with rashid on 6/28 but would like something sooner please advise   Would the patient rather a call back or a response via [facility]? Call back  Best Call Back Number: [phone]  Additional Information: n/a

## 2022-05-26 ENCOUNTER — PATIENT MESSAGE (OUTPATIENT)
Dept: INTERNAL MEDICINE | Facility: CLINIC | Age: 47
End: 2022-05-26
Payer: COMMERCIAL

## 2022-05-26 DIAGNOSIS — J45.909 ASTHMA, UNSPECIFIED ASTHMA SEVERITY, UNSPECIFIED WHETHER COMPLICATED, UNSPECIFIED WHETHER PERSISTENT: Primary | ICD-10-CM

## 2022-05-26 RX ORDER — ALBUTEROL SULFATE 90 UG/1
1-2 AEROSOL, METERED RESPIRATORY (INHALATION) EVERY 4 HOURS PRN
Qty: 54 G | Refills: 0 | Status: SHIPPED | OUTPATIENT
Start: 2022-05-26 | End: 2023-01-17

## 2022-05-26 RX ORDER — CLONAZEPAM 0.5 MG/1
0.5 TABLET ORAL NIGHTLY PRN
Qty: 30 TABLET | Refills: 3 | Status: SHIPPED | OUTPATIENT
Start: 2022-05-26 | End: 2022-11-29 | Stop reason: SDUPTHER

## 2022-05-26 RX ORDER — CLONAZEPAM 0.5 MG/1
0.5 TABLET ORAL NIGHTLY PRN
Qty: 30 TABLET | Refills: 3 | Status: SHIPPED | OUTPATIENT
Start: 2022-05-26 | End: 2022-06-21 | Stop reason: SDUPTHER

## 2022-05-26 RX ORDER — SACUBITRIL AND VALSARTAN 49; 51 MG/1; MG/1
1 TABLET, FILM COATED ORAL 2 TIMES DAILY
Qty: 60 TABLET | Refills: 0 | Status: SHIPPED | OUTPATIENT
Start: 2022-05-26 | End: 2022-11-29 | Stop reason: ALTCHOICE

## 2022-05-26 NOTE — TELEPHONE ENCOUNTER
No new care gaps identified.  [facility] Embedded Care Gaps. Reference number: [id number]. 5/26/2022   10:21:25 AM CDT

## 2022-05-26 NOTE — TELEPHONE ENCOUNTER
No new care gaps identified.  [facility] Embedded Care Gaps. Reference number: [id number]. 5/26/2022   7:21:52 AM ASHOKT

## 2022-06-21 ENCOUNTER — LAB VISIT (OUTPATIENT)
Dept: LAB | Facility: HOSPITAL | Age: 47
End: 2022-06-21
Attending: INTERNAL MEDICINE
Payer: COMMERCIAL

## 2022-06-21 ENCOUNTER — OFFICE VISIT (OUTPATIENT)
Dept: CARDIOLOGY | Facility: CLINIC | Age: 47
End: 2022-06-21
Payer: COMMERCIAL

## 2022-06-21 VITALS
HEIGHT: 72 IN | WEIGHT: 171.88 LBS | DIASTOLIC BLOOD PRESSURE: 90 MMHG | HEART RATE: 80 BPM | BODY MASS INDEX: 23.28 KG/M2 | SYSTOLIC BLOOD PRESSURE: 126 MMHG

## 2022-06-21 DIAGNOSIS — I42.9 CARDIOMYOPATHY, UNSPECIFIED TYPE: ICD-10-CM

## 2022-06-21 DIAGNOSIS — I50.22 CHRONIC SYSTOLIC CONGESTIVE HEART FAILURE: Primary | ICD-10-CM

## 2022-06-21 DIAGNOSIS — E78.00 HYPERCHOLESTEREMIA: ICD-10-CM

## 2022-06-21 DIAGNOSIS — I50.22 CHRONIC SYSTOLIC CONGESTIVE HEART FAILURE: ICD-10-CM

## 2022-06-21 PROCEDURE — 1159F PR MEDICATION LIST DOCUMENTED IN MEDICAL RECORD: ICD-10-PCS | Mod: CPTII,S$GLB,, | Performed by: INTERNAL MEDICINE

## 2022-06-21 PROCEDURE — 3008F BODY MASS INDEX DOCD: CPT | Mod: CPTII,S$GLB,, | Performed by: INTERNAL MEDICINE

## 2022-06-21 PROCEDURE — 93010 EKG 12-LEAD: ICD-10-PCS | Mod: S$GLB,,, | Performed by: INTERNAL MEDICINE

## 2022-06-21 PROCEDURE — 93005 EKG 12-LEAD: ICD-10-PCS | Mod: S$GLB,,, | Performed by: INTERNAL MEDICINE

## 2022-06-21 PROCEDURE — 1160F RVW MEDS BY RX/DR IN RCRD: CPT | Mod: CPTII,S$GLB,, | Performed by: INTERNAL MEDICINE

## 2022-06-21 PROCEDURE — 36415 COLL VENOUS BLD VENIPUNCTURE: CPT | Mod: PO | Performed by: INTERNAL MEDICINE

## 2022-06-21 PROCEDURE — 99999 PR PBB SHADOW E&M-EST. PATIENT-LVL IV: CPT | Mod: PBBFAC,,, | Performed by: INTERNAL MEDICINE

## 2022-06-21 PROCEDURE — 1160F PR REVIEW ALL MEDS BY PRESCRIBER/CLIN PHARMACIST DOCUMENTED: ICD-10-PCS | Mod: CPTII,S$GLB,, | Performed by: INTERNAL MEDICINE

## 2022-06-21 PROCEDURE — 3080F DIAST BP >= 90 MM HG: CPT | Mod: CPTII,S$GLB,, | Performed by: INTERNAL MEDICINE

## 2022-06-21 PROCEDURE — 4010F PR ACE/ARB THEARPY RXD/TAKEN: ICD-10-PCS | Mod: CPTII,S$GLB,, | Performed by: INTERNAL MEDICINE

## 2022-06-21 PROCEDURE — 3074F SYST BP LT 130 MM HG: CPT | Mod: CPTII,S$GLB,, | Performed by: INTERNAL MEDICINE

## 2022-06-21 PROCEDURE — 93010 ELECTROCARDIOGRAM REPORT: CPT | Mod: S$GLB,,, | Performed by: INTERNAL MEDICINE

## 2022-06-21 PROCEDURE — 93005 ELECTROCARDIOGRAM TRACING: CPT | Mod: S$GLB,,, | Performed by: INTERNAL MEDICINE

## 2022-06-21 PROCEDURE — 3008F PR BODY MASS INDEX (BMI) DOCUMENTED: ICD-10-PCS | Mod: CPTII,S$GLB,, | Performed by: INTERNAL MEDICINE

## 2022-06-21 PROCEDURE — 3080F PR MOST RECENT DIASTOLIC BLOOD PRESSURE >= 90 MM HG: ICD-10-PCS | Mod: CPTII,S$GLB,, | Performed by: INTERNAL MEDICINE

## 2022-06-21 PROCEDURE — 99204 OFFICE O/P NEW MOD 45 MIN: CPT | Mod: S$GLB,,, | Performed by: INTERNAL MEDICINE

## 2022-06-21 PROCEDURE — 1159F MED LIST DOCD IN RCRD: CPT | Mod: CPTII,S$GLB,, | Performed by: INTERNAL MEDICINE

## 2022-06-21 PROCEDURE — 99204 PR OFFICE/OUTPT VISIT, NEW, LEVL IV, 45-59 MIN: ICD-10-PCS | Mod: S$GLB,,, | Performed by: INTERNAL MEDICINE

## 2022-06-21 PROCEDURE — 3074F PR MOST RECENT SYSTOLIC BLOOD PRESSURE < 130 MM HG: ICD-10-PCS | Mod: CPTII,S$GLB,, | Performed by: INTERNAL MEDICINE

## 2022-06-21 PROCEDURE — 99999 PR PBB SHADOW E&M-EST. PATIENT-LVL IV: ICD-10-PCS | Mod: PBBFAC,,, | Performed by: INTERNAL MEDICINE

## 2022-06-21 PROCEDURE — 4010F ACE/ARB THERAPY RXD/TAKEN: CPT | Mod: CPTII,S$GLB,, | Performed by: INTERNAL MEDICINE

## 2022-06-21 PROCEDURE — 80053 COMPREHEN METABOLIC PANEL: CPT | Performed by: INTERNAL MEDICINE

## 2022-06-21 NOTE — PROGRESS NOTES
Subjective:     Problem List:  Nonischemic cardiomyopathy  LVEF 10-15% in 8/2020   - improved to 40-45% by 8/2021  Hypercholesterolemia  Hypertension  Tobacco use    HPI:   Dev Sweeney is a 47 y.o. male referred by Loki Alba DO for evaluation of chronic combines systolic and diastolic heart failure.    In 8/2020 he presented to hospital with shortness of breath and was found to have a severe cardiomyopathy. LV was dilated measuring 6.8 cm and LVEF was 10% by echo.  Coronary angiography revealed normal coronary arteries. LVEDP was 34 mmHg. The discharge summary lists alcohol as a possible contributor to the cardiomyopathy.     He was followed at the [facility] until recently. On carvedilol 25 mg bid.  Losartan was switched to sacubitril/valsartan. Also takes furosemide 40 mg daily w an extra tab about once a week for fluid retention. SBPs at home are 115-120 mm Hg. His most recent echocardiogram dated 08/2021 revealed improvement in LV function LVEF 40-45%.  He currently works off [employer] and does not report chest discomfort or shortness of breath with exertion.  Does not report orthopnea, PND or edema.      Review of Systems   Constitutional: Positive for malaise/fatigue and weight gain. Negative for weight loss.   Cardiovascular: Negative for chest pain, dyspnea on exertion, leg swelling and palpitations.   Respiratory: Negative for cough and hemoptysis.    Hematologic/Lymphatic: Does not bruise/bleed easily.   Musculoskeletal: Negative for arthritis and muscle cramps.   Gastrointestinal: Positive for heartburn. Negative for abdominal pain and melena.   Genitourinary: Positive for frequency. Negative for hematuria and nocturia.   Neurological: Negative for headaches, light-headedness and seizures.   Psychiatric/Behavioral: Negative for depression.       Review of patient's allergies indicates:   Allergen Reactions    Penicillins Anaphylaxis     Other reaction(s): Hives  Other  "reaction(s): Difficulty breathing        Current Outpatient Medications   Medication Sig    albuterol (PROVENTIL/VENTOLIN HFA) 90 mcg/actuation inhaler Inhale 1-2 puffs into the lungs every 4 (four) hours as needed for Wheezing. Rescue    aspirin 81 MG Chew Take 1 tablet (81 mg total) by mouth once daily.    carvediloL (COREG) 25 MG tablet TAKE 1 TABLET BY MOUTH TWICE A DAY WITH FOOD    clonazePAM (KLONOPIN) 0.5 MG tablet TAKE 1 TABLET (0.5 MG TOTAL) BY MOUTH NIGHTLY AS NEEDED (INSOMNIA).    ENTRESTO 49-51 mg per tablet Take 1 tablet by mouth 2 (two) times daily.    furosemide (LASIX) 40 MG tablet Take 1 tablet (40 mg total) by mouth once daily.    HYDROcodone-acetaminophen (NORCO)  mg per tablet Take 1 tablet by mouth daily as needed.    magnesium oxide (MAG-OX) 400 mg (241.3 mg magnesium) tablet Take 1 tablet by mouth once daily.    omeprazole (PRILOSEC) 40 MG capsule TAKE 1 CAPSULE BY MOUTH EVERY DAY IN THE MORNING    rosuvastatin (CRESTOR) 10 MG tablet Take 1 tablet (10 mg total) by mouth once daily.    syringe with needle (SYRINGE 3CC/22GX1") 3 mL 22 gauge x 1" Syrg 1 Units by Misc.(Non-Drug; Combo Route) route every 28 days.    testosterone cypionate (DEPOTESTOTERONE CYPIONATE) 200 mg/mL injection Inject 1.5 mLs (300 mg total) into the muscle every 14 (fourteen) days.         Past Medical and Surgical history:  Dev Sweeney  has a past medical history of Allergy, Asthma, CHF (congestive heart failure), GERD (gastroesophageal reflux disease), Hyperlipidemia, Hypertension, and Insomnia.   Past Surgical History:   Procedure Laterality Date    CORONARY ANGIOGRAPHY N/A 8/26/2020    Procedure: ANGIOGRAM, CORONARY ARTERY;  Surgeon: Alan Lao MD;  Location: [facility] CATH LAB;  Service: Cardiology;  Laterality: N/A;    FRACTURE SURGERY      HERNIA REPAIR      LEFT HEART CATHETERIZATION Right 8/26/2020    Procedure: Left heart cath;  Surgeon: Alan Lao MD;  Location[facility]" C[facility] LAB;  Service: Cardiology;  Laterality: Right;    ORBITAL FRACTURE SURGERY      left    VASECTOMY           Social history:  Dev Sweeney  reports that he has quit smoking. He has a 15.00 pack-year smoking history. He has never used smokeless tobacco. He reports current alcohol use of about 2 standard drinks of alcohol per week. He reports that he does not use drugs.    Family history:  Family History   Problem Relation Age of Onset    Heart disease Mother     Cancer Paternal Grandmother     Diabetes Paternal Grandmother     Cancer Paternal Grandfather     Colon polyps Neg Hx             Objective:     BP (!) 126/90   Pulse 80   Ht 6' (1.829 m)   Wt 78 kg (171 lb 13.6 oz)   BMI 23.31 kg/m²     Physical Exam  Constitutional:       Appearance: He is well-developed.      Comments: BP (!) 126/90   Pulse 80   Ht 6' (1.829 m)   Wt 78 kg (171 lb 13.6 oz)   BMI 23.31 kg/m²      HENT:      Head: Normocephalic and atraumatic.   Neck:      Vascular: No carotid bruit or JVD.   Cardiovascular:      Rate and Rhythm: Normal rate and regular rhythm.      Pulses:           Radial pulses are 2+ on the right side and 2+ on the left side.        Posterior tibial pulses are 2+ on the right side and 2+ on the left side.      Heart sounds: S1 normal and S2 normal. No murmur heard.    No gallop.   Pulmonary:      Effort: Pulmonary effort is normal.      Breath sounds: No wheezing or rales.   Chest:      Chest wall: There is no dullness to percussion.   Abdominal:      Palpations: Abdomen is soft. There is no hepatomegaly or splenomegaly.      Tenderness: There is no abdominal tenderness.   Musculoskeletal:      Right lower leg: No edema.      Left lower leg: No edema.   Skin:     General: Skin is warm and dry.      Findings: No bruising.      Nails: There is no clubbing.   Neurological:      Mental Status: He is alert and oriented to person, place, and time.      Gait: Gait normal.   Psychiatric:         Speech:  Speech normal.         Behavior: Behavior normal.         Thought Content: Thought content normal.         Judgment: Judgment normal.           Lab Results   Component Value Date    CHOL 177 10/08/2021    CHOL 114 (L) 08/25/2020    CHOL 137 10/23/2019     Lab Results   Component Value Date    HDL 42 10/08/2021    HDL 20 (L) 08/25/2020    HDL 47 10/23/2019     Lab Results   Component Value Date    LDLCALC 116.0 10/08/2021    LDLCALC 74.6 08/25/2020    LDLCALC 75.2 10/23/2019     Lab Results   Component Value Date    TRIG 95 10/08/2021    TRIG 97 08/25/2020    TRIG 74 10/23/2019     Lab Results   Component Value Date    CHOLHDL 23.7 10/08/2021    CHOLHDL 17.5 (L) 08/25/2020    CHOLHDL 34.3 10/23/2019     Lab Results   Component Value Date    ALT 39 10/08/2021    AST 32 10/08/2021    ALKPHOS 54 (L) 10/08/2021    BILITOT 1.0 10/08/2021      Lab Results   Component Value Date    CREATININE 1.0 10/08/2021    BUN 13 10/08/2021     (L) 10/08/2021    K 4.0 10/08/2021    CL 98 10/08/2021    CO2 26 10/08/2021       Results for orders placed during the hospital encounter of 08/24/20    Echo Color Flow Doppler? Yes    Interpretation Summary  · Eccentric left ventricular hypertrophy.  · Severely decreased left ventricular systolic function. The estimated ejection fraction is 10%.  · Severe left ventricular enlargement.  · Grade III (severe) left ventricular diastolic dysfunction consistent with restrictive physiology.  · No wall motion abnormalities.  · Moderately to severely reduced right ventricular systolic function.  · Moderate right ventricular enlargement.  · Mild mitral regurgitation.  · Mild tricuspid regurgitation.  · Elevated central venous pressure (15 mmHg).       No valid procedures specified.      Assessment and Plan:       ICD-10-CM ICD-9-CM   1. Chronic systolic congestive heart failure  I50.22 428.22     428.0   2. Cardiomyopathy - nonischemic  I42.9 425.4   3. Hypercholesteremia  E78.00 272.0        He has  a nonischemic cardiomyopathy with severe LV systolic dysfunction when he was first diagnosed in 08/2020.  On carvedilol, sacubitril/valsartan and furosemide LVEF has improved 45%.  He is not on the full dose of sacubitril/valsartan.  Currently NYHA class I-II. Discussed use of spironolactone and SGLT2 inhibitors.  Obtain CMP today.  Will likely add spironolactone 25 mg. He may need to adjust the dose of furosemide.  Alcohol restriction.  Salt restriction. Daily weights. Extra dose of diuretic if weight increases 2 or more lbs. in a 24 hr period, or 3-4 lbs. over a 3-4 day period. Instructed patient to contact our office if weight does not return to baseline.  Continue rosuvastatin.  Will repeat lipid profile on return to clinic in 3 months.      Orders entered during this encounter:    Chronic systolic congestive heart failure  -     Ambulatory referral/consult to Cardiology  -     IN OFFICE EKG 12-LEAD (to Muse); Future; Expected date: 06/21/2022  -     Comprehensive Metabolic Panel; Future; Expected date: 06/21/2022  -     Echo; Future; Expected date: 09/21/2022  -     Comprehensive Metabolic Panel; Future; Expected date: 09/21/2022    Cardiomyopathy - nonischemic    Hypercholesteremia  -     Lipid Panel; Future; Expected date: 09/21/2022         Follow up in about 3 months (around 9/21/2022).  Add an SGLT2 inhibitor if LVEF is 40% or lower.

## 2022-06-21 NOTE — PATIENT INSTRUCTIONS
I would like to add spironolactone (also known as Aldactone).  It is a mild diuretic (fluid pill) but is mainly used to blunt the affects of a hormone in the body that is harmful in individuals with heart failure.  You may need to reduce the dose of furosemide (Lasix) to avoid dehydration.  A relatively common but not dangerous side effect of spironolactone is mild breast tenderness.      Continue carvedilol and Entresto. Stay away from salt.  No more than 2 alcoholic drinks a week.      LOW-SALT DIET (1.5 grams/day)   This diet eliminates foods that are high in salt and restricts the amount of salt that you cook with. It is most often used for patients with high blood pressure, edema (fluid retention), kidney, liver, and heart disease.   Table salt contains the mineral sodium. The body needs a little bit of sodium to work normally. But too much sodium can make your health problems worse. Your total daily allowance of salt (sodium) is 1.5 grams. This equals 1500 milligrams (mg). This is about 3/4 of a teaspoon of salt. This means you can have only about 500 mg of sodium at each meal.  Most people can a lot of sodium from snacks.  Look carefully for sodium levels at or around 100 mg per serving of snacks and do not eat more than 1-2 servings at a time.     When you cook, limit the salt you use. And if you can avoid using salt, even better. Do not add salt at the table. So, throw away the saltshaker!   When shopping, read the package labels. Salt is often called sodium on the label. Choose foods that are Salt-Free, Low Salt, or Very Low Salt. Note that foods with Reduced Salt may not lower your salt intake enough.     BEVERAGES OK: Tea, coffee, carbonated beverages, juices   AVOID: Flavored international coffees, electrolyte replacement drinks, sports beverages     BREAD & CEREALS OK: All regular bread, rolls, cereals, cakes; low-salt crackers, matzoh crackers   AVOID: Salted crackers, pretzels, popcorn; Azeri  toast, pancakes, muffins     FRUITS & DESSERTS OK: Ice cream, frozen yogurt, juice bars, gelatin (Jell-O), cookies and pies, sugar, honey, jelly, hard candy   AVOID: Most pies, cakes and cookies prepared or processed with salt, instant pudding     MEATS OK: All fresh meat, fish, poultry, low-salt tuna   AVOID: Smoked, pickled, brine-cured, or salted meats or fish. This includes briggs, chipped beef, corned beef, hot dogs, luncheon meats, ham, kosher meats, salt pork, sausage, canned tuna, salted codfish, smoked salmon, herring, sardines, or anchovies.     DAIRY OK: Milk, chocolate milk, hot chocolate mix; eggs, Low Salt cheeses, yogurt, egg substitute   AVOID: Processed cheese, cheese spreads, Roquefort, Camembert, and cottage cheese, buttermilk, instant breakfast drink     BEANS, POTATOES & PASTA OK: Dry beans, split peas, lentils, potatoes, rice, macaroni, noodles, spaghetti without added salt   AVOID: Potato chips, tortilla chips, and similar products     SOUPS OK: Low-salt soups and broths made with allowed foods   AVOID: Bouillon cubes, soups with smoked or salted meats, regular soup and broth     VEGETABLES OK: Most are okay; low-salt tomato and vegetable juices   AVOID: Sauerkraut and other brine-soaked vegetables, pickles and other pickled vegetables, tomato juice, olives       SEASONING & SPICES: OK: Most seasonings are okay. Good substitutes for salt include: fresh herb blends, Tabasco, lemon, garlic, jama, vinegar, dry mustard, parsley, cilantro, horseradish, tomato paste, regular margarine, mayonnaise, butter, cream cheese, vegetable oil, cream, low-salt salad dressing and gravy   AVOID: Regular ketchup, relishes, pickles, soy sauce, teriyaki sauce, Worcestershire sauce, BBQ sauce, tartar sauce, meat tenderizer, chili sauce, regular gravy, regular salad dressing   © 2225-0778 [employer], [address] [place] [address], [place] [address]. All rights reserved. This information is not intended as a  substitute for professional medical care. Always follow your healthcare professional's instructions.

## 2022-06-22 LAB
ALBUMIN SERPL BCP-MCNC: 4.1 G/DL (ref 3.5–5.2)
ALP SERPL-CCNC: 61 U/L (ref 55–135)
ALT SERPL W/O P-5'-P-CCNC: 16 U/L (ref 10–44)
ANION GAP SERPL CALC-SCNC: 12 MMOL/L (ref 8–16)
AST SERPL-CCNC: 22 U/L (ref 10–40)
BILIRUB SERPL-MCNC: 0.9 MG/DL (ref 0.1–1)
BUN SERPL-MCNC: 7 MG/DL (ref 6–20)
CALCIUM SERPL-MCNC: 9.3 MG/DL (ref 8.7–10.5)
CHLORIDE SERPL-SCNC: 102 MMOL/L (ref 95–110)
CO2 SERPL-SCNC: 26 MMOL/L (ref 23–29)
CREAT SERPL-MCNC: 1.1 MG/DL (ref 0.5–1.4)
EST. GFR  (AFRICAN AMERICAN): >60 ML/MIN/1.73 M^2
EST. GFR  (NON AFRICAN AMERICAN): >60 ML/MIN/1.73 M^2
GLUCOSE SERPL-MCNC: 104 MG/DL (ref 70–110)
POTASSIUM SERPL-SCNC: 3.9 MMOL/L (ref 3.5–5.1)
PROT SERPL-MCNC: 6.7 G/DL (ref 6–8.4)
SODIUM SERPL-SCNC: 140 MMOL/L (ref 136–145)

## 2022-06-28 ENCOUNTER — OFFICE VISIT (OUTPATIENT)
Dept: GASTROENTEROLOGY | Facility: CLINIC | Age: 47
End: 2022-06-28
Payer: COMMERCIAL

## 2022-06-28 VITALS — BODY MASS INDEX: 32.55 KG/M2 | WEIGHT: 240.31 LBS | HEIGHT: 72 IN

## 2022-06-28 DIAGNOSIS — Z12.11 COLON CANCER SCREENING: ICD-10-CM

## 2022-06-28 DIAGNOSIS — K44.9 HIATAL HERNIA: Primary | ICD-10-CM

## 2022-06-28 PROCEDURE — 4010F ACE/ARB THERAPY RXD/TAKEN: CPT | Mod: CPTII,S$GLB,, | Performed by: INTERNAL MEDICINE

## 2022-06-28 PROCEDURE — 1159F MED LIST DOCD IN RCRD: CPT | Mod: CPTII,S$GLB,, | Performed by: INTERNAL MEDICINE

## 2022-06-28 PROCEDURE — 99999 PR PBB SHADOW E&M-EST. PATIENT-LVL III: ICD-10-PCS | Mod: PBBFAC,,, | Performed by: INTERNAL MEDICINE

## 2022-06-28 PROCEDURE — 99214 OFFICE O/P EST MOD 30 MIN: CPT | Mod: S$GLB,,, | Performed by: INTERNAL MEDICINE

## 2022-06-28 PROCEDURE — 99999 PR PBB SHADOW E&M-EST. PATIENT-LVL III: CPT | Mod: PBBFAC,,, | Performed by: INTERNAL MEDICINE

## 2022-06-28 PROCEDURE — 3008F BODY MASS INDEX DOCD: CPT | Mod: CPTII,S$GLB,, | Performed by: INTERNAL MEDICINE

## 2022-06-28 PROCEDURE — 1159F PR MEDICATION LIST DOCUMENTED IN MEDICAL RECORD: ICD-10-PCS | Mod: CPTII,S$GLB,, | Performed by: INTERNAL MEDICINE

## 2022-06-28 PROCEDURE — 99214 PR OFFICE/OUTPT VISIT, EST, LEVL IV, 30-39 MIN: ICD-10-PCS | Mod: S$GLB,,, | Performed by: INTERNAL MEDICINE

## 2022-06-28 PROCEDURE — 4010F PR ACE/ARB THEARPY RXD/TAKEN: ICD-10-PCS | Mod: CPTII,S$GLB,, | Performed by: INTERNAL MEDICINE

## 2022-06-28 PROCEDURE — 3008F PR BODY MASS INDEX (BMI) DOCUMENTED: ICD-10-PCS | Mod: CPTII,S$GLB,, | Performed by: INTERNAL MEDICINE

## 2022-06-28 RX ORDER — HYOSCYAMINE SULFATE 0.125 MG
125 TABLET ORAL EVERY 6 HOURS PRN
Qty: 90 TABLET | Refills: 6 | Status: SHIPPED | OUTPATIENT
Start: 2022-06-28 | End: 2022-11-29

## 2022-06-28 RX ORDER — SODIUM, POTASSIUM,MAG SULFATES 17.5-3.13G
1 SOLUTION, RECONSTITUTED, ORAL ORAL DAILY
Qty: 1 KIT | Refills: 0 | Status: SHIPPED | OUTPATIENT
Start: 2022-06-28 | End: 2022-06-30

## 2022-06-28 NOTE — PROGRESS NOTES
Subjective:       Patient ID: Dev Sweeney is a 47 y.o. male.    Chief Complaint: Hiatal Hernia    Patient here today to reestablish care with aforementioned complaints.  He has previously seen by Dr. Shrestha in 2020 after ER presentation for noncardiac chest pain.  He was sent for EGD however exam was never performed.  He was subsequently lost to follow-up.  Today patient reports history of hiatal hernia repair almost 20 years ago.  However the past several years he has experienced return and heartburn.  Denies typically controlled with PPI however he does report some breakthrough.  In addition he reports occasional fits in which she believes his diaphragm is convulsing.  She associates complaints with severe hiccups which last several hours to days.  It occurred this past weekend before ultimately resolving.  He does report associated next soreness due to the severity.  Occurs once or twice per year.      CTA chest 08/2020  Impression:     1. Exam limitations described above, within these constraints, no convincing pulmonary arterial filling defect to the level of the lobar/segmental arteries noting most limitation involving the pulmonary arteries to the bilateral lower lobes as detailed above.  Correlation of these findings with D-dimer and/or lower extremity ultrasound is recommended.  2. There are a few scattered ground-glass attenuating foci along the periphery of the left upper lobe and along the anterior aspect of the right upper lobe.  This could reflect sequela of motion artifact however viral process can present in a similar fashion and correlation is recommended.  3. Nonspecific bilateral gynecomastia.  4. Moderate hiatal hernia.  5. Cardiomegaly and several additional findings above.    Review of Systems   Cardiovascular: Positive for chest pain.   Gastrointestinal: Positive for abdominal distention and abdominal pain.   Musculoskeletal: Positive for neck pain.       The following portions of  the patient's history were reviewed and updated as appropriate: allergies, current medications, past family history, past medical history, past social history, past surgical history and problem list.    Objective:      Physical Exam  Constitutional:       Appearance: He is well-developed.   HENT:      Head: Normocephalic and atraumatic.   Eyes:      Conjunctiva/sclera: Conjunctivae normal.   Pulmonary:      Effort: Pulmonary effort is normal. No respiratory distress.   Neurological:      Mental Status: He is alert and oriented to person, place, and time.   Psychiatric:         Behavior: Behavior normal.         Thought Content: Thought content normal.         Judgment: Judgment normal.           Pertinent labs and imaging studies reviewed    Assessment:       1. Hiatal hernia    2. Colon cancer screening        Plan:         Schedule EGD given breakthrough heartburn after hiatal hernia repair  Patient due for colonoscopy for screening purposes.  Can be performed on the same day  Pviviane Raymundo    (Portions of this note were dictated using voice recognition software and may contain dictation related errors in spelling/grammar/syntax not found on text review)

## 2022-06-28 NOTE — PATIENT INSTRUCTIONS
SUPREP Instructions    [facility] [address]  Clinic Office [phone]  Endoscopy Lab [phone]    You are scheduled for a Colonoscopy with  Dr. ABE CROOKS  on    08/18/2022   at [facility] in [place].    Check in at the Hospital -1st floor, Information desk.   Call [phone] to reschedule.    An adult friend/family member must come with you to drive you home.  You cannot drive, take a taxi, Uber/Lyft or bus to leave the Endoscopy Center alone.  If you do not have someone to drive you home, your test will be cancelled.     Please follow the directions of your doctor if you take any pills that thin your blood. If you take these meds: Aggrenox, Brilinta, Effient, Eliquis, Lovenox, Plavix, Pletal, Pradaxa, Ticilid, Xarelto or Coumadin, let the doctor's office know.    DON'T: On the morning of the test do not take insulin or pills for diabetes.     DO: On the morning of the test, do take any pills for blood pressure, heart, anti-rejection and or seizures with a small sip of water. Bring any inhalers with you.    To have a good prep, you must follow these instructions - please do not use the directions from the pharmacy.    The doctor will send a prescription for the SUPREP.      The Day Before the test:    You can only drink CLEAR LIQUIDS the whole day before your test.  You can't eat any food for the whole day.    You CAN have:  Water, Coffee or decaf coffee (no milk or cream)  Tea  Soft drinks - regular and sugar free  Jello (green or yellow)  Apple Juice, white grape juice, white cranberry juice  Gatorade, Power Aid, Crystal Light, Mark Aid  Lemonade and Limeade  Bouillon, clear soup  Snowball, popsicles  YOU CAN'T DRINK ANYTHING RED, PURPLE ORANGE OR BLUE   YOU CAN'T DRINK ALCOHOL  ONLY DRINK WHAT IS ON THE LIST      At 5 pm the night before your test:    Pour the 1st bottle of SUPREP into the cup provided in the box. Add water to the line on the cup and mix  well.  Drink the whole cup and then drink 2 more full cups of water over 1 hour.  This can be easier to drink if it is cold. You can mix it 20 minutes ahead of time and place in the refrigerator before you drink it.  You must drink it within 30-45 minutes of mixing it.  Do NOT pour the drink over ice.  You can drink it with a straw.    The Day of the test - We will call you 2 days before your test to tell you what time to get there.    5 hours before you come to the hospital (this may be in the middle of the night)  Pour the 2nd bottle of SUPREP into the cup provided in the box. Add water to the line on the cup and mix well.  Drink the whole cup and then drink 2 more full cups of water over 1 hour.  It might be easier to drink if it is cold. You can mix it 20 minutes ahead of time and place in the refrigerator before you drink it.  You must drink it within 30-45 minutes of mixing it.  Do NOT pour the drink over ice.  You can drink it with a straw.    YOU CAN'T EAT OR DRINK ANYTHING ELSE ONCE YOU FINISH THE PREP    Leave all valuables and jewelry at home. You will be at the hospital for 2-4 hours.    Call the Endoscopy department at [phone] with any questions about your procedure.

## 2022-07-20 ENCOUNTER — TELEPHONE (OUTPATIENT)
Dept: GASTROENTEROLOGY | Facility: CLINIC | Age: 47
End: 2022-07-20
Payer: COMMERCIAL

## 2022-07-20 NOTE — TELEPHONE ENCOUNTER
----- Message from Angus Balderas sent at 7/20/2022 10:51 AM CDT -----  Contact: Pt  Type:  Patient Returning Call    Who Called:Pt   Would the patient rather a call back or a response via [facility]? Call back  Best Call Back Number: [phone]  Additional Information:     Call back with procedure time

## 2022-07-26 ENCOUNTER — PATIENT MESSAGE (OUTPATIENT)
Dept: ADMINISTRATIVE | Facility: HOSPITAL | Age: 47
End: 2022-07-26
Payer: COMMERCIAL

## 2022-07-26 ENCOUNTER — PATIENT OUTREACH (OUTPATIENT)
Dept: ADMINISTRATIVE | Facility: HOSPITAL | Age: 47
End: 2022-07-26
Payer: COMMERCIAL

## 2022-08-04 ENCOUNTER — TELEPHONE (OUTPATIENT)
Dept: UROLOGY | Facility: CLINIC | Age: 47
End: 2022-08-04
Payer: COMMERCIAL

## 2022-08-10 ENCOUNTER — TELEPHONE (OUTPATIENT)
Dept: UROLOGY | Facility: CLINIC | Age: 47
End: 2022-08-10
Payer: COMMERCIAL

## 2022-08-10 NOTE — TELEPHONE ENCOUNTER
Pt called re:8/29 appt.  Pt is going to have his testosterone level drawn and will call once it is done.        ----- Message from Philomena Noble sent at 8/10/2022 11:32 AM CDT -----  Type: Patient Call Back    Who called:self    What is the request in detail:Pt was called to reschedule 8/29 appt. He would like an appt around the week of 9/12.    Can the clinic reply by [facility]?no    Would the patient rather a call back or a response via [facility]? call    Best call back number:[phone] (home) [phone] ()                7

## 2022-08-15 ENCOUNTER — TELEPHONE (OUTPATIENT)
Dept: INTERNAL MEDICINE | Facility: CLINIC | Age: 47
End: 2022-08-15
Payer: COMMERCIAL

## 2022-08-15 VITALS — DIASTOLIC BLOOD PRESSURE: 73 MMHG | SYSTOLIC BLOOD PRESSURE: 108 MMHG

## 2022-08-16 ENCOUNTER — TELEPHONE (OUTPATIENT)
Dept: ENDOSCOPY | Facility: HOSPITAL | Age: 47
End: 2022-08-16
Payer: COMMERCIAL

## 2022-08-16 NOTE — TELEPHONE ENCOUNTER
Spoke with patient about arrival time @ 0830.  EGD/Colon  Covid test = vacc    Prep instructions reviewed: the day before the procedure, follow a clear liquid diet all day, then start the first 1/2 of prep at 5pm and take 2nd 1/2 of prep @. 0330  Pt must be completely NPO when prep completed @ 0530.              Medications: Do not take Insulin or oral diabetic medications the day of the procedure.  Take as prescribed: heart, seizure and blood pressure medication in the morning with a sip of water (less than an ounce).  Take any breathing medications and bring inhalers to hospital with you Leave all valuables and jewelry at home.     Wear comfortable clothes to procedure to change into hospital gown You cannot drive for 24 hours after your procedure because you will receive sedation for your procedure to make you comfortable.  A ride must be provided at discharge.

## 2022-08-18 ENCOUNTER — ANESTHESIA (OUTPATIENT)
Dept: ENDOSCOPY | Facility: HOSPITAL | Age: 47
End: 2022-08-18
Payer: COMMERCIAL

## 2022-08-18 ENCOUNTER — ANESTHESIA EVENT (OUTPATIENT)
Dept: ENDOSCOPY | Facility: HOSPITAL | Age: 47
End: 2022-08-18
Payer: COMMERCIAL

## 2022-08-18 ENCOUNTER — HOSPITAL ENCOUNTER (OUTPATIENT)
Facility: HOSPITAL | Age: 47
Discharge: HOME | End: 2022-08-18
Attending: INTERNAL MEDICINE | Admitting: INTERNAL MEDICINE
Payer: COMMERCIAL

## 2022-08-18 VITALS
DIASTOLIC BLOOD PRESSURE: 60 MMHG | HEART RATE: 70 BPM | BODY MASS INDEX: 18.42 KG/M2 | HEIGHT: 72 IN | TEMPERATURE: 98 F | OXYGEN SATURATION: 99 % | RESPIRATION RATE: 18 BRPM | WEIGHT: 136 LBS | SYSTOLIC BLOOD PRESSURE: 122 MMHG

## 2022-08-18 DIAGNOSIS — R10.9 ABDOMINAL PAIN: ICD-10-CM

## 2022-08-18 DIAGNOSIS — K44.9 HIATAL HERNIA: Primary | ICD-10-CM

## 2022-08-18 PROCEDURE — G0121 COLON CA SCRN NOT HI RSK IND: ICD-10-PCS | Mod: ,,, | Performed by: INTERNAL MEDICINE

## 2022-08-18 PROCEDURE — 27201012 HC FORCEPS, HOT/COLD, DISP: Performed by: INTERNAL MEDICINE

## 2022-08-18 PROCEDURE — 88305 TISSUE EXAM BY PATHOLOGIST: CPT | Performed by: PATHOLOGY

## 2022-08-18 PROCEDURE — 43239 EGD BIOPSY SINGLE/MULTIPLE: CPT | Performed by: INTERNAL MEDICINE

## 2022-08-18 PROCEDURE — 88305 TISSUE EXAM BY PATHOLOGIST: ICD-10-PCS | Mod: 26,,, | Performed by: PATHOLOGY

## 2022-08-18 PROCEDURE — 37000009 HC ANESTHESIA EA ADD 15 MINS: Performed by: INTERNAL MEDICINE

## 2022-08-18 PROCEDURE — 63600175 PHARM REV CODE 636 W HCPCS: Performed by: NURSE ANESTHETIST, CERTIFIED REGISTERED

## 2022-08-18 PROCEDURE — 25000003 PHARM REV CODE 250: Performed by: INTERNAL MEDICINE

## 2022-08-18 PROCEDURE — 88342 IMHCHEM/IMCYTCHM 1ST ANTB: CPT | Performed by: PATHOLOGY

## 2022-08-18 PROCEDURE — 88342 CHG IMMUNOCYTOCHEMISTRY: ICD-10-PCS | Mod: 26,,, | Performed by: PATHOLOGY

## 2022-08-18 PROCEDURE — G0121 COLON CA SCRN NOT HI RSK IND: HCPCS | Performed by: INTERNAL MEDICINE

## 2022-08-18 PROCEDURE — 43239 PR EGD, FLEX, W/BIOPSY, SGL/MULTI: ICD-10-PCS | Mod: 51,,, | Performed by: INTERNAL MEDICINE

## 2022-08-18 PROCEDURE — 88305 TISSUE EXAM BY PATHOLOGIST: CPT | Mod: 26,,, | Performed by: PATHOLOGY

## 2022-08-18 PROCEDURE — 37000008 HC ANESTHESIA 1ST 15 MINUTES: Performed by: INTERNAL MEDICINE

## 2022-08-18 PROCEDURE — 43239 EGD BIOPSY SINGLE/MULTIPLE: CPT | Mod: 51,,, | Performed by: INTERNAL MEDICINE

## 2022-08-18 PROCEDURE — 88342 IMHCHEM/IMCYTCHM 1ST ANTB: CPT | Mod: 26,,, | Performed by: PATHOLOGY

## 2022-08-18 PROCEDURE — G0121 COLON CA SCRN NOT HI RSK IND: HCPCS | Mod: ,,, | Performed by: INTERNAL MEDICINE

## 2022-08-18 PROCEDURE — 25000003 PHARM REV CODE 250: Performed by: NURSE ANESTHETIST, CERTIFIED REGISTERED

## 2022-08-18 RX ORDER — PROPOFOL 10 MG/ML
VIAL (ML) INTRAVENOUS CONTINUOUS PRN
Status: DISCONTINUED | OUTPATIENT
Start: 2022-08-18 | End: 2022-08-18

## 2022-08-18 RX ORDER — SODIUM CHLORIDE 9 MG/ML
INJECTION, SOLUTION INTRAVENOUS CONTINUOUS
Status: DISCONTINUED | OUTPATIENT
Start: 2022-08-18 | End: 2022-08-18 | Stop reason: HOSPADM

## 2022-08-18 RX ORDER — PROPOFOL 10 MG/ML
VIAL (ML) INTRAVENOUS
Status: DISCONTINUED | OUTPATIENT
Start: 2022-08-18 | End: 2022-08-18

## 2022-08-18 RX ORDER — LIDOCAINE HYDROCHLORIDE 20 MG/ML
INJECTION INTRAVENOUS
Status: DISCONTINUED | OUTPATIENT
Start: 2022-08-18 | End: 2022-08-18

## 2022-08-18 RX ADMIN — SODIUM CHLORIDE: 0.9 INJECTION, SOLUTION INTRAVENOUS at 09:08

## 2022-08-18 RX ADMIN — LIDOCAINE HYDROCHLORIDE 100 MG: 20 INJECTION, SOLUTION INTRAVENOUS at 10:08

## 2022-08-18 RX ADMIN — PROPOFOL 100 MG: 10 INJECTION, EMULSION INTRAVENOUS at 10:08

## 2022-08-18 RX ADMIN — PROPOFOL 150 MCG/KG/MIN: 10 INJECTION, EMULSION INTRAVENOUS at 10:08

## 2022-08-18 NOTE — ANESTHESIA POSTPROCEDURE EVALUATION
Anesthesia Post Evaluation    Patient: Dev Sweeney    Procedure(s) Performed: Procedure(s) (LRB):  EGD (ESOPHAGOGASTRODUODENOSCOPY) (N/A)  COLONOSCOPY (N/A)    Final Anesthesia Type: MAC      Patient location during evaluation: GI PACU  Patient participation: Yes- Able to Participate  Level of consciousness: awake and alert and oriented  Post-procedure vital signs: reviewed and stable  Pain management: adequate  Airway patency: patent    PONV status at discharge: No PONV  Anesthetic complications: no      Cardiovascular status: blood pressure returned to baseline  Respiratory status: unassisted, spontaneous ventilation and room air  Hydration status: euvolemic  Follow-up not needed.          Vitals Value Taken Time   /60 08/18/22 1034   Temp 36.7 °C (98 °F) 08/18/22 1034   Pulse 72 08/18/22 1034   Resp 20 08/18/22 1034   SpO2 99 % 08/18/22 1034         No case tracking events are documented in the log.      Pain/Veda Score: Veda Score: 9 (8/18/2022 10:31 AM)

## 2022-08-18 NOTE — TRANSFER OF CARE
Anesthesia Transfer of Care Note    Patient: Dev Sweeney    Procedure(s) Performed: Procedure(s) (LRB):  EGD (ESOPHAGOGASTRODUODENOSCOPY) (N/A)  COLONOSCOPY (N/A)    Patient location: GI    Anesthesia Type: MAC    Transport from OR: Transported from OR on room air with adequate spontaneous ventilation    Post pain: adequate analgesia    Post assessment: no apparent anesthetic complications    Post vital signs: stable    Level of consciousness: awake, alert and oriented    Nausea/Vomiting: no nausea/vomiting    Complications: none    Transfer of care protocol was followed      Last vitals:   Visit Vitals  /60   Pulse 72   Temp 36.7 °C (98 °F)   Resp 20   Ht 6' (1.829 m)   Wt 61.7 kg (136 lb)   SpO2 99%   BMI 18.44 kg/m²

## 2022-08-18 NOTE — PROVATION PATIENT INSTRUCTIONS
Discharge Summary/Instructions after an Endoscopic Procedure  Patient Name: Dev Sweeney  Patient MRN: [id number]  Patient YOB: 1975 Thursday, August 18, 2022  Saud Bui MD  Dear patient,  As a result of recent federal legislation (The Federal Cures Act), you may   receive lab or pathology results from your procedure in your [facility]   account before your physician is able to contact you. Your physician or   their representative will relay the results to you with their   recommendations at their soonest availability.  Thank you,  Your health is very important to us during the Covid Crisis. Following your   procedure today, you will receive a daily text for 2 weeks asking about   signs or symptoms of Covid 19.  Please respond to this text when you   receive it so we can follow up and keep you as safe as possible.   RESTRICTIONS:  During your procedure today, you received medications for sedation.  These   medications may affect your judgment, balance and coordination.  Therefore,   for 24 hours, you have the following restrictions:   - DO NOT drive a car, operate machinery, make legal/financial decisions,   sign important papers or drink alcohol.    ACTIVITY:  Today: no heavy lifting, straining or running due to procedural   sedation/anesthesia.  The following day: return to full activity including work.  DIET:  Eat and drink normally unless instructed otherwise.     TREATMENT FOR COMMON SIDE EFFECTS:  - Mild abdominal pain, nausea, belching, bloating or excessive gas:  rest,   eat lightly and use a heating pad.  - Sore Throat: treat with throat lozenges and/or gargle with warm salt   water.  - Because air was used during the procedure, expelling large amounts of air   from your rectum or belching is normal.  - If a bowel prep was taken, you may not have a bowel movement for 1-3 days.    This is normal.  SYMPTOMS TO WATCH FOR AND REPORT TO YOUR PHYSICIAN:  1. Abdominal pain or bloating,  other than gas cramps.  2. Chest pain.  3. Back pain.  4. Signs of infection such as: chills or fever occurring within 24 hours   after the procedure.  5. Rectal bleeding, which would show as bright red, maroon, or black stools.   (A tablespoon of blood from the rectum is not serious, especially if   hemorrhoids are present.)  6. Vomiting.  7. Weakness or dizziness.  GO DIRECTLY TO THE NEAREST EMERGENCY ROOM IF YOU HAVE ANY OF THE FOLLOWING:      Difficulty breathing              Chills and/or fever over 101 F   Persistent vomiting and/or vomiting blood   Severe abdominal pain   Severe chest pain   Black, tarry stools   Bleeding- more than one tablespoon   Any other symptom or condition that you feel may need urgent attention  Your doctor recommends these additional instructions:  If any biopsies were taken, your doctors clinic will contact you in 1 to 2   weeks with any results.  - Discharge patient to home.   - Resume previous diet.   - Continue present medications.   - Await pathology results.   - Perform a colonoscopy as previously scheduled.  For questions, problems or results please call your physician - Saud Bui MD.  EMERGENCY PHONE NUMBER: [phone],  LAB RESULTS: [phone]  IF A COMPLICATION OR EMERGENCY SITUATION ARISES AND YOU ARE UNABLE TO REACH   YOUR PHYSICIAN - GO DIRECTLY TO THE EMERGENCY ROOM.  Saud Bui MD  8/18/2022 10:20:27 AM  This report has been verified and signed electronically.  Dear patient,  As a result of recent federal legislation (The Federal Cures Act), you may   receive lab or pathology results from your procedure in your [facility]   account before your physician is able to contact you. Your physician or   their representative will relay the results to you with their   recommendations at their soonest availability.  Thank you,  PROVATION

## 2022-08-18 NOTE — PLAN OF CARE
Pt procedure completed with no completed with no complications. V/S are stable.No distress noted at this time. Will continue to monitor.

## 2022-08-18 NOTE — ANESTHESIA PREPROCEDURE EVALUATION
08/18/2022  Dev Sweeney is a 47 y.o., male presents for EGD/colonoscopy under MAC.    Past Medical History:   Diagnosis Date    Allergy     Asthma     CHF (congestive heart failure)     GERD (gastroesophageal reflux disease)     Hyperlipidemia     Hypertension     Insomnia      Past Surgical History:   Procedure Laterality Date    CORONARY ANGIOGRAPHY N/A 8/26/2020    Procedure: ANGIOGRAM, CORONARY ARTERY;  Surgeon: Alan Lao MD;  Location: [facility] CATH LAB;  Service: Cardiology;  Laterality: N/A;    FRACTURE SURGERY      HERNIA REPAIR      LEFT HEART CATHETERIZATION Right 8/26/2020    Procedure: Left heart cath;  Surgeon: Alan Lao MD;  Location: [facility] CATH LAB;  Service: Cardiology;  Laterality: Right;    ORBITAL FRACTURE SURGERY      left    VASECTOMY             Pre-op Assessment    I have reviewed the Patient Summary Reports.     I have reviewed the Nursing Notes. I have reviewed the NPO Status.   I have reviewed the Medications.     Review of Systems  Anesthesia Hx:  No problems with previous Anesthesia    Cardiovascular:   Hypertension CHF    Pulmonary:   Asthma    Hepatic/GI:   GERD Liver Disease,        Physical Exam  General: Cooperative, Well nourished, Oriented and Alert    Airway:  Mallampati: II   Mouth Opening: Normal  TM Distance: Normal  Tongue: Normal    Chest/Lungs:  Clear to auscultation, Normal Respiratory Rate    Heart:  Rate: Normal  Rhythm: Regular Rhythm  Sounds: Normal        Anesthesia Plan  Type of Anesthesia, risks & benefits discussed:    Anesthesia Type: MAC  Intra-op Monitoring Plan: Standard ASA Monitors  Post Op Pain Control Plan: multimodal analgesia  Induction:  IV  Informed Consent: Informed consent signed with the Patient and all parties understand the risks and agree with anesthesia plan.  All questions answered.   ASA Score:  3    Ready For Surgery From Anesthesia Perspective.     .

## 2022-08-18 NOTE — PROVATION PATIENT INSTRUCTIONS
Discharge Summary/Instructions after an Endoscopic Procedure  Patient Name: Dev Sweeney  Patient MRN: [id number]  Patient YOB: 1975 Thursday, August 18, 2022  Saud Bui MD  Dear patient,  As a result of recent federal legislation (The Federal Cures Act), you may   receive lab or pathology results from your procedure in your [facility]   account before your physician is able to contact you. Your physician or   their representative will relay the results to you with their   recommendations at their soonest availability.  Thank you,  Your health is very important to us during the Covid Crisis. Following your   procedure today, you will receive a daily text for 2 weeks asking about   signs or symptoms of Covid 19.  Please respond to this text when you   receive it so we can follow up and keep you as safe as possible.   RESTRICTIONS:  During your procedure today, you received medications for sedation.  These   medications may affect your judgment, balance and coordination.  Therefore,   for 24 hours, you have the following restrictions:   - DO NOT drive a car, operate machinery, make legal/financial decisions,   sign important papers or drink alcohol.    ACTIVITY:  Today: no heavy lifting, straining or running due to procedural   sedation/anesthesia.  The following day: return to full activity including work.  DIET:  Eat and drink normally unless instructed otherwise.     TREATMENT FOR COMMON SIDE EFFECTS:  - Mild abdominal pain, nausea, belching, bloating or excessive gas:  rest,   eat lightly and use a heating pad.  - Sore Throat: treat with throat lozenges and/or gargle with warm salt   water.  - Because air was used during the procedure, expelling large amounts of air   from your rectum or belching is normal.  - If a bowel prep was taken, you may not have a bowel movement for 1-3 days.    This is normal.  SYMPTOMS TO WATCH FOR AND REPORT TO YOUR PHYSICIAN:  1. Abdominal pain or bloating,  other than gas cramps.  2. Chest pain.  3. Back pain.  4. Signs of infection such as: chills or fever occurring within 24 hours   after the procedure.  5. Rectal bleeding, which would show as bright red, maroon, or black stools.   (A tablespoon of blood from the rectum is not serious, especially if   hemorrhoids are present.)  6. Vomiting.  7. Weakness or dizziness.  GO DIRECTLY TO THE NEAREST EMERGENCY ROOM IF YOU HAVE ANY OF THE FOLLOWING:      Difficulty breathing              Chills and/or fever over 101 F   Persistent vomiting and/or vomiting blood   Severe abdominal pain   Severe chest pain   Black, tarry stools   Bleeding- more than one tablespoon   Any other symptom or condition that you feel may need urgent attention  Your doctor recommends these additional instructions:  If any biopsies were taken, your doctors clinic will contact you in 1 to 2   weeks with any results.  - Discharge patient to home.   - Resume previous diet.   - Continue present medications.   - Repeat colonoscopy in 10 years for screening purposes.   - Patient has a contact number available for emergencies.  The signs and   symptoms of potential delayed complications were discussed with the   patient.  Return to normal activities tomorrow.  Written discharge   instructions were provided to the patient.  For questions, problems or results please call your physician - Saud Bui MD.  EMERGENCY PHONE NUMBER: [phone],  LAB RESULTS: [phone]  IF A COMPLICATION OR EMERGENCY SITUATION ARISES AND YOU ARE UNABLE TO REACH   YOUR PHYSICIAN - GO DIRECTLY TO THE EMERGENCY ROOM.  Saud Bui MD  8/18/2022 10:37:40 AM  This report has been verified and signed electronically.  Dear patient,  As a result of recent federal legislation (The Federal Cures Act), you may   receive lab or pathology results from your procedure in your [facility]   account before your physician is able to contact you. Your physician or    their representative will relay the results to you with their   recommendations at their soonest availability.  Thank you,  PROVATION

## 2022-08-23 LAB
FINAL PATHOLOGIC DIAGNOSIS: NORMAL
GROSS: NORMAL
Lab: NORMAL

## 2022-08-23 NOTE — H&P
Short Stay Endoscopy History and Physical    PCP - Loki Alba DO    Procedure - EGD/colonoscopy  ASA - III  Mallampati - per anesthesia  History of Anesthesia problems - no  Family history Anesthesia problems - no     HPI:  This is a 47 y.o. male here for evaluation of : Nausea. Screening     Family history of colon cancer -    History of polyps -   Change in bowel habits - no  Rectal bleeding - no      ROS:  Constitutional: No fevers, chills, No weight loss  ENT: No allergies  CV: No chest pain  Pulm: No shortness of breath  GI: see HPI  Derm: No rash    Medical History:  has a past medical history of Allergy, Asthma, CHF (congestive heart failure), GERD (gastroesophageal reflux disease), Hyperlipidemia, Hypertension, and Insomnia.    Surgical History:  has a past surgical history that includes Fracture surgery; Hernia repair; Orbital fracture repair; Vasectomy; Left heart catheterization (Right, 8/26/2020); Coronary angiography (N/A, 8/26/2020); Esophagogastroduodenoscopy (N/A, 8/18/2022); and Colonoscopy (N/A, 8/18/2022).    Family History: family history includes Cancer in his paternal grandfather and paternal grandmother; Diabetes in his paternal grandmother; Heart disease in his mother.. Otherwise no colon cancer, inflammatory bowel disease, or GI malignancies.    Social History:  reports that he has quit smoking. He has a 15.00 pack-year smoking history. He has never used smokeless tobacco. He reports current alcohol use of about 1.0 standard drink of alcohol per week. He reports that he does not use drugs.    Review of patient's allergies indicates:   Allergen Reactions    Penicillins Anaphylaxis     Other reaction(s): Hives  Other reaction(s): Difficulty breathing       Medications:   No medications prior to admission.         Objective Findings:    Vital Signs: see nursing notes  Physical Exam:  General Appearance: Well appearing in no acute distress  Eyes:    No scleral icterus  ENT: Neck  supple  Lungs: CTA anteriorly  Heart:  S1, S2 normal, no murmurs heard  Abdomen: Soft, non tender, non distended with positive bowel sounds. No hepatosplenomegaly, ascites, or mass  Extremities: no edema  Skin: No rash      Labs:  Lab Results   Component Value Date    WBC 8.10 10/08/2021    HGB 14.1 10/08/2021    HCT 46.9 10/08/2021     10/08/2021    CHOL 177 10/08/2021    TRIG 95 10/08/2021    HDL 42 10/08/2021    ALT 16 06/21/2022    AST 22 06/21/2022     06/21/2022    K 3.9 06/21/2022     06/21/2022    CREATININE 1.1 06/21/2022    BUN 7 06/21/2022    CO2 26 06/21/2022    TSH 0.883 10/08/2021    PSA 0.62 10/08/2021    INR 1.1 08/24/2020    HGBA1C 4.9 08/24/2020       I have explained the risks and benefits of endoscopy procedures to the patient including but not limited to bleeding, perforation, infection, and death.    Saud Bui MD

## 2022-08-24 ENCOUNTER — OFFICE VISIT (OUTPATIENT)
Dept: SURGERY | Facility: CLINIC | Age: 47
End: 2022-08-24
Payer: COMMERCIAL

## 2022-08-24 VITALS
HEART RATE: 77 BPM | HEIGHT: 72 IN | SYSTOLIC BLOOD PRESSURE: 100 MMHG | WEIGHT: 243.19 LBS | BODY MASS INDEX: 32.94 KG/M2 | DIASTOLIC BLOOD PRESSURE: 69 MMHG

## 2022-08-24 DIAGNOSIS — K44.9 HIATAL HERNIA: ICD-10-CM

## 2022-08-24 PROCEDURE — 3074F SYST BP LT 130 MM HG: CPT | Mod: CPTII,S$GLB,, | Performed by: STUDENT IN AN ORGANIZED HEALTH CARE EDUCATION/TRAINING PROGRAM

## 2022-08-24 PROCEDURE — 1159F PR MEDICATION LIST DOCUMENTED IN MEDICAL RECORD: ICD-10-PCS | Mod: CPTII,S$GLB,, | Performed by: STUDENT IN AN ORGANIZED HEALTH CARE EDUCATION/TRAINING PROGRAM

## 2022-08-24 PROCEDURE — 1160F RVW MEDS BY RX/DR IN RCRD: CPT | Mod: CPTII,S$GLB,, | Performed by: STUDENT IN AN ORGANIZED HEALTH CARE EDUCATION/TRAINING PROGRAM

## 2022-08-24 PROCEDURE — 3008F PR BODY MASS INDEX (BMI) DOCUMENTED: ICD-10-PCS | Mod: CPTII,S$GLB,, | Performed by: STUDENT IN AN ORGANIZED HEALTH CARE EDUCATION/TRAINING PROGRAM

## 2022-08-24 PROCEDURE — 3008F BODY MASS INDEX DOCD: CPT | Mod: CPTII,S$GLB,, | Performed by: STUDENT IN AN ORGANIZED HEALTH CARE EDUCATION/TRAINING PROGRAM

## 2022-08-24 PROCEDURE — 4010F PR ACE/ARB THEARPY RXD/TAKEN: ICD-10-PCS | Mod: CPTII,S$GLB,, | Performed by: STUDENT IN AN ORGANIZED HEALTH CARE EDUCATION/TRAINING PROGRAM

## 2022-08-24 PROCEDURE — 4010F ACE/ARB THERAPY RXD/TAKEN: CPT | Mod: CPTII,S$GLB,, | Performed by: STUDENT IN AN ORGANIZED HEALTH CARE EDUCATION/TRAINING PROGRAM

## 2022-08-24 PROCEDURE — 99999 PR PBB SHADOW E&M-EST. PATIENT-LVL IV: ICD-10-PCS | Mod: PBBFAC,,, | Performed by: STUDENT IN AN ORGANIZED HEALTH CARE EDUCATION/TRAINING PROGRAM

## 2022-08-24 PROCEDURE — 99204 OFFICE O/P NEW MOD 45 MIN: CPT | Mod: S$GLB,,, | Performed by: STUDENT IN AN ORGANIZED HEALTH CARE EDUCATION/TRAINING PROGRAM

## 2022-08-24 PROCEDURE — 1160F PR REVIEW ALL MEDS BY PRESCRIBER/CLIN PHARMACIST DOCUMENTED: ICD-10-PCS | Mod: CPTII,S$GLB,, | Performed by: STUDENT IN AN ORGANIZED HEALTH CARE EDUCATION/TRAINING PROGRAM

## 2022-08-24 PROCEDURE — 99999 PR PBB SHADOW E&M-EST. PATIENT-LVL IV: CPT | Mod: PBBFAC,,, | Performed by: STUDENT IN AN ORGANIZED HEALTH CARE EDUCATION/TRAINING PROGRAM

## 2022-08-24 PROCEDURE — 3078F DIAST BP <80 MM HG: CPT | Mod: CPTII,S$GLB,, | Performed by: STUDENT IN AN ORGANIZED HEALTH CARE EDUCATION/TRAINING PROGRAM

## 2022-08-24 PROCEDURE — 1159F MED LIST DOCD IN RCRD: CPT | Mod: CPTII,S$GLB,, | Performed by: STUDENT IN AN ORGANIZED HEALTH CARE EDUCATION/TRAINING PROGRAM

## 2022-08-24 PROCEDURE — 3074F PR MOST RECENT SYSTOLIC BLOOD PRESSURE < 130 MM HG: ICD-10-PCS | Mod: CPTII,S$GLB,, | Performed by: STUDENT IN AN ORGANIZED HEALTH CARE EDUCATION/TRAINING PROGRAM

## 2022-08-24 PROCEDURE — 3078F PR MOST RECENT DIASTOLIC BLOOD PRESSURE < 80 MM HG: ICD-10-PCS | Mod: CPTII,S$GLB,, | Performed by: STUDENT IN AN ORGANIZED HEALTH CARE EDUCATION/TRAINING PROGRAM

## 2022-08-24 PROCEDURE — 99204 PR OFFICE/OUTPT VISIT, NEW, LEVL IV, 45-59 MIN: ICD-10-PCS | Mod: S$GLB,,, | Performed by: STUDENT IN AN ORGANIZED HEALTH CARE EDUCATION/TRAINING PROGRAM

## 2022-08-24 NOTE — PROGRESS NOTES
Pathologic findings for recent EGD reviewed.  No evidence of H pylori infection identified.  Some mild inflammation was noted.  Continue current medications.  Referral to general surgery for evaluation of HH. Follow up as needed

## 2022-08-26 NOTE — PROGRESS NOTES
Patient ID: Dev Sweeney is a 47 y.o. male.    Chief Complaint: No chief complaint on file.      HPI:  HPI  47M here for eval for recurrent HH. He was seen by GI for history of reflux. Underwent EGD and cscope. EGD showed hiatal hernia, no inflammation. He currently denies symptoms. Takes omeprazole daily. Hx of lap Nissen? 18 years ago at . Shortly after this developed recurrent reflux and has been on PPI with complete symptoms control however does have signfiicant reflux if he does miss even one dose. OCcasionally has spells of hiccups that last days which he atributes to overeating. Denies ab pain, NVd. Would prefer to be off of PPI.      Review of Systems   Constitutional: Negative for chills, diaphoresis and fever.   HENT: Negative for trouble swallowing.    Respiratory: Negative for cough, shortness of breath, wheezing and stridor.    Cardiovascular: Negative for chest pain and palpitations.   Gastrointestinal: Negative for abdominal distention, abdominal pain, blood in stool, diarrhea, nausea and vomiting.   Endocrine: Negative for cold intolerance and heat intolerance.   Genitourinary: Negative for difficulty urinating.   Musculoskeletal: Negative for back pain.   Skin: Negative for rash.   Allergic/Immunologic: Negative for immunocompromised state.   Neurological: Negative for dizziness, syncope and numbness.   Hematological: Negative for adenopathy.   Psychiatric/Behavioral: Negative for agitation.       Current Outpatient Medications   Medication Sig Dispense Refill    albuterol (PROVENTIL/VENTOLIN HFA) 90 mcg/actuation inhaler Inhale 1-2 puffs into the lungs every 4 (four) hours as needed for Wheezing. Rescue 54 g 0    carvediloL (COREG) 25 MG tablet TAKE 1 TABLET BY MOUTH TWICE A DAY WITH FOOD 180 tablet 3    clonazePAM (KLONOPIN) 0.5 MG tablet TAKE 1 TABLET (0.5 MG TOTAL) BY MOUTH NIGHTLY AS NEEDED (INSOMNIA). 30 tablet 3    ENTRESTO 49-51 mg per tablet Take 1 tablet by mouth 2 (two) times  "daily. 60 tablet 0    furosemide (LASIX) 40 MG tablet Take 1 tablet (40 mg total) by mouth once daily. 90 tablet 3    HYDROcodone-acetaminophen (NORCO)  mg per tablet Take 1 tablet by mouth daily as needed.      magnesium oxide (MAG-OX) 400 mg (241.3 mg magnesium) tablet Take 1 tablet by mouth once daily.      omeprazole (PRILOSEC) 40 MG capsule TAKE 1 CAPSULE BY MOUTH EVERY DAY IN THE MORNING 90 capsule 3    rosuvastatin (CRESTOR) 10 MG tablet Take 1 tablet (10 mg total) by mouth once daily. 90 tablet 3    syringe with needle (SYRINGE 3CC/22GX1") 3 mL 22 gauge x 1" Syrg 1 Units by Misc.(Non-Drug; Combo Route) route every 28 days. 100 each 11    testosterone cypionate (DEPOTESTOTERONE CYPIONATE) 200 mg/mL injection Inject 1.5 mLs (300 mg total) into the muscle every 14 (fourteen) days. 10 mL 5    aspirin 81 MG Chew Take 1 tablet (81 mg total) by mouth once daily.  0    hyoscyamine (ANASPAZ,LEVSIN) 0.125 mg Tab Take 1 tablet (125 mcg total) by mouth every 6 (six) hours as needed. 90 tablet 6     No current facility-administered medications for this visit.       Review of patient's allergies indicates:   Allergen Reactions    Penicillins Anaphylaxis     Other reaction(s): Hives  Other reaction(s): Difficulty breathing       Past Medical History:   Diagnosis Date    Allergy     Asthma     CHF (congestive heart failure)     GERD (gastroesophageal reflux disease)     Hyperlipidemia     Hypertension     Insomnia        Past Surgical History:   Procedure Laterality Date    COLONOSCOPY N/A 8/18/2022    Procedure: COLONOSCOPY;  Surgeon: Saud Bui MD;  Location: [facility] ENDO;  Service: Endoscopy;  Laterality: N/A;    CORONARY ANGIOGRAPHY N/A 8/26/2020    Procedure: ANGIOGRAM, CORONARY ARTERY;  Surgeon: Alan Lao MD;  Location: [facility] CATH LAB;  Service: Cardiology;  Laterality: N/A;    ESOPHAGOGASTRODUODENOSCOPY N/A 8/18/2022    Procedure: EGD (ESOPHAGOGASTRODUODENOSCOPY);  " Surgeon: Saud Bui MD;  Location: [facility] ENDO;  Service: Endoscopy;  Laterality: N/A;    FRACTURE SURGERY      HERNIA REPAIR      LEFT HEART CATHETERIZATION Right 8/26/2020    Procedure: Left heart cath;  Surgeon: Alan Lao MD;  Location: [facility] CATH LAB;  Service: Cardiology;  Laterality: Right;    ORBITAL FRACTURE SURGERY      left    VASECTOMY         Family History   Problem Relation Age of Onset    Heart disease Mother     Cancer Paternal Grandmother     Diabetes Paternal Grandmother     Cancer Paternal Grandfather     Colon polyps Neg Hx        Social History     Socioeconomic History    Marital status:    Tobacco Use    Smoking status: Former Smoker     Packs/day: 1.00     Years: 15.00     Pack years: 15.00    Smokeless tobacco: Never Used    Tobacco comment: trying to quit   Substance and Sexual Activity    Alcohol use: Yes     Alcohol/week: 1.0 standard drink     Types: 1 Shots of liquor per week     Comment: socially    Drug use: No    Sexual activity: Yes     Partners: Female       Vitals:    08/24/22 1358   BP: 100/69   Pulse: 77       Physical Exam  Constitutional:       General: He is not in acute distress.  HENT:      Head: Normocephalic and atraumatic.   Eyes:      General: No scleral icterus.  Cardiovascular:      Rate and Rhythm: Normal rate.   Pulmonary:      Effort: Pulmonary effort is normal. No respiratory distress.      Breath sounds: No stridor.   Abdominal:      Palpations: Abdomen is soft.      Tenderness: There is no abdominal tenderness.   Lymphadenopathy:      Cervical: No cervical adenopathy.   Skin:     General: Skin is warm.      Findings: No erythema.   Neurological:      Mental Status: He is alert and oriented to person, place, and time.   Psychiatric:         Behavior: Behavior normal.     Body mass index is 32.98 kg/m².  CT 2020 shows small hiatal hernia  CT 2014 shows hiatal hernia about the same size as today    Assessment & Plan:    47M with recurrent hiatal hernia repain  Currently well controlled with medication  Discussed options of redo surgery vs medical management.  Wants to try to lose some weight, thinks that helped before. Hernia does not appear to be changing and his symptoms are well controlled as of now. No GI bleedings.  RTC prn, can reassess if anything changes but would not strongly recommend redo HHR at the moment, he agrees.

## 2022-08-31 ENCOUNTER — LAB VISIT (OUTPATIENT)
Dept: LAB | Facility: HOSPITAL | Age: 47
End: 2022-08-31
Attending: UROLOGY
Payer: COMMERCIAL

## 2022-08-31 DIAGNOSIS — E29.1 HYPOGONADISM MALE: ICD-10-CM

## 2022-08-31 LAB — TESTOST SERPL-MCNC: >1500 NG/DL (ref 304–1227)

## 2022-08-31 PROCEDURE — 84403 ASSAY OF TOTAL TESTOSTERONE: CPT | Performed by: UROLOGY

## 2022-08-31 PROCEDURE — 36415 COLL VENOUS BLD VENIPUNCTURE: CPT | Mod: PO | Performed by: UROLOGY

## 2022-09-15 PROBLEM — I50.42 CHRONIC COMBINED SYSTOLIC AND DIASTOLIC HEART FAILURE: Status: ACTIVE | Noted: 2022-09-15

## 2022-09-15 PROBLEM — I42.8 NONISCHEMIC CARDIOMYOPATHY: Status: ACTIVE | Noted: 2022-09-15

## 2022-11-29 ENCOUNTER — OFFICE VISIT (OUTPATIENT)
Dept: CARDIOLOGY | Facility: CLINIC | Age: 47
End: 2022-11-29
Payer: COMMERCIAL

## 2022-11-29 VITALS
DIASTOLIC BLOOD PRESSURE: 86 MMHG | HEIGHT: 72 IN | SYSTOLIC BLOOD PRESSURE: 110 MMHG | WEIGHT: 253.06 LBS | HEART RATE: 96 BPM | BODY MASS INDEX: 34.28 KG/M2

## 2022-11-29 DIAGNOSIS — I42.8 NONISCHEMIC CARDIOMYOPATHY: Primary | ICD-10-CM

## 2022-11-29 DIAGNOSIS — E78.2 MIXED HYPERLIPIDEMIA: ICD-10-CM

## 2022-11-29 DIAGNOSIS — I50.22 CHRONIC SYSTOLIC HEART FAILURE: ICD-10-CM

## 2022-11-29 PROCEDURE — 99999 PR PBB SHADOW E&M-EST. PATIENT-LVL III: ICD-10-PCS | Mod: PBBFAC,,, | Performed by: INTERNAL MEDICINE

## 2022-11-29 PROCEDURE — 1160F PR REVIEW ALL MEDS BY PRESCRIBER/CLIN PHARMACIST DOCUMENTED: ICD-10-PCS | Mod: CPTII,S$GLB,, | Performed by: INTERNAL MEDICINE

## 2022-11-29 PROCEDURE — 99214 OFFICE O/P EST MOD 30 MIN: CPT | Mod: S$GLB,,, | Performed by: INTERNAL MEDICINE

## 2022-11-29 PROCEDURE — 3079F DIAST BP 80-89 MM HG: CPT | Mod: CPTII,S$GLB,, | Performed by: INTERNAL MEDICINE

## 2022-11-29 PROCEDURE — 4010F PR ACE/ARB THEARPY RXD/TAKEN: ICD-10-PCS | Mod: CPTII,S$GLB,, | Performed by: INTERNAL MEDICINE

## 2022-11-29 PROCEDURE — 3074F PR MOST RECENT SYSTOLIC BLOOD PRESSURE < 130 MM HG: ICD-10-PCS | Mod: CPTII,S$GLB,, | Performed by: INTERNAL MEDICINE

## 2022-11-29 PROCEDURE — 1159F MED LIST DOCD IN RCRD: CPT | Mod: CPTII,S$GLB,, | Performed by: INTERNAL MEDICINE

## 2022-11-29 PROCEDURE — 1159F PR MEDICATION LIST DOCUMENTED IN MEDICAL RECORD: ICD-10-PCS | Mod: CPTII,S$GLB,, | Performed by: INTERNAL MEDICINE

## 2022-11-29 PROCEDURE — 1160F RVW MEDS BY RX/DR IN RCRD: CPT | Mod: CPTII,S$GLB,, | Performed by: INTERNAL MEDICINE

## 2022-11-29 PROCEDURE — 3008F PR BODY MASS INDEX (BMI) DOCUMENTED: ICD-10-PCS | Mod: CPTII,S$GLB,, | Performed by: INTERNAL MEDICINE

## 2022-11-29 PROCEDURE — 3079F PR MOST RECENT DIASTOLIC BLOOD PRESSURE 80-89 MM HG: ICD-10-PCS | Mod: CPTII,S$GLB,, | Performed by: INTERNAL MEDICINE

## 2022-11-29 PROCEDURE — 99999 PR PBB SHADOW E&M-EST. PATIENT-LVL III: CPT | Mod: PBBFAC,,, | Performed by: INTERNAL MEDICINE

## 2022-11-29 PROCEDURE — 99214 PR OFFICE/OUTPT VISIT, EST, LEVL IV, 30-39 MIN: ICD-10-PCS | Mod: S$GLB,,, | Performed by: INTERNAL MEDICINE

## 2022-11-29 PROCEDURE — 4010F ACE/ARB THERAPY RXD/TAKEN: CPT | Mod: CPTII,S$GLB,, | Performed by: INTERNAL MEDICINE

## 2022-11-29 PROCEDURE — 3008F BODY MASS INDEX DOCD: CPT | Mod: CPTII,S$GLB,, | Performed by: INTERNAL MEDICINE

## 2022-11-29 PROCEDURE — 3074F SYST BP LT 130 MM HG: CPT | Mod: CPTII,S$GLB,, | Performed by: INTERNAL MEDICINE

## 2022-11-29 RX ORDER — LOSARTAN POTASSIUM 50 MG/1
50 TABLET ORAL DAILY
COMMUNITY
End: 2022-11-29 | Stop reason: ALTCHOICE

## 2022-11-29 RX ORDER — FUROSEMIDE 40 MG/1
40 TABLET ORAL DAILY
Qty: 90 TABLET | Refills: 3 | Status: SHIPPED | OUTPATIENT
Start: 2022-11-29 | End: 2023-10-08

## 2022-11-29 RX ORDER — SACUBITRIL AND VALSARTAN 24; 26 MG/1; MG/1
1 TABLET, FILM COATED ORAL 2 TIMES DAILY
Qty: 90 TABLET | Refills: 3 | Status: SHIPPED | OUTPATIENT
Start: 2022-11-29 | End: 2023-05-29

## 2022-11-29 NOTE — PATIENT INSTRUCTIONS
Stop losartan  Begin Entresto twice a day  Stay away from salt as much as possible.  No alcohol - that is the best way to avoid a relapse

## 2022-11-29 NOTE — PROGRESS NOTES
Subjective:     Problem List:  Nonischemic cardiomyopathy  LVEF 10-15% in 8/2020   - improved to 40-45% by 8/2021  Hypercholesterolemia  Hypertension  Tobacco use - quit 2020    HPI:   Dev Sweeney is a 47 y.o. male who presents for follow-up of Chronic Systolic Heart Failure  He has a history of severe biventricular cardiomyopathy due to alcohol.  The cardiomyopathy resolved after alcohol abstinence and with medical therapy.  He feels well and does not report angina or shortness of breath with exertion.  He does not report orthopnea, PND or edema.     Echo 09/2022 at [facility] LV AARON 5.1 cm, LVEF 50%, dilated right ventricle with normal right ventricular function, PA systolic pressure 28 mm Hg.  Aortic root was mildly dilated measuring 4.2 cm and ascending aorta 3.8 cm.  Recent lab work at [facility] total cholesterol 117, triglycerides 200, HDL 36, LDL of 48 mg/dl.  Glucose 80, BUN 7, creatinine 0.9, sodium 140, potassium 3.7, ALT 21, AST 35.    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 Ran out of sacubitril/valsartan a few months ago and started using losartan instead. He went on an alcohol and cigarette binge for 3 days recently while entertaining a relative from out of town.      Review of patient's allergies indicates:   Allergen Reactions    Penicillins Anaphylaxis     Other reaction(s): Hives  Other reaction(s): Difficulty breathing        Current Outpatient Medications   Medication Sig    albuterol (PROVENTIL/VENTOLIN HFA) 90 mcg/actuation inhaler Inhale 1-2 puffs into the lungs every 4 (four) hours as needed for Wheezing. Rescue    aspirin 81 MG Chew Take 1 tablet (81 mg total) by mouth once daily.    carvediloL (COREG) 25 MG tablet TAKE 1 TABLET BY MOUTH TWICE A DAY WITHFOOD    clonazePAM (KLONOPIN) 0.5 MG tablet TAKE 1 TABLET (0.5 MG TOTAL) BY MOUTH NIGHTLY AS NEEDED (INSOMNIA).    furosemide (LASIX) 40 MG tablet Take 1 tablet (40 mg  "total) by mouth once daily.    HYDROcodone-acetaminophen (NORCO)  mg per tablet Take 1 tablet by mouth daily as needed.    magnesium oxide (MAG-OX) 400 mg (241.3 mg magnesium) tablet Take 1 tablet by mouth once daily.    omeprazole (PRILOSEC) 40 MG capsule TAKE 1 CAPSULE BY MOUTH EVERY DAY IN THE MORNING    rosuvastatin (CRESTOR) 10 MG tablet TAKE 1 TABLET BY MOUTH EVERY DAY    syringe with needle (SYRINGE 3CC/22GX1") 3 mL 22 gauge x 1" Syrg 1 Units by Misc.(Non-Drug; Combo Route) route every 28 days.    testosterone cypionate (DEPOTESTOTERONE CYPIONATE) 200 mg/mL injection INJECT 1.5 MLS (300 MG TOTAL) INTO THE MUSCLE EVERY 14 (FOURTEEN) DAYS.    losartan (COZAAR) 50 MG tablet Take 50 mg by mouth once daily.     No current facility-administered medications for this visit.       Social history:  Dev Sweeney  reports that he has quit smoking. He has a 15.00 pack-year smoking history. He has never used smokeless tobacco. He reports current alcohol use of about 1.0 standard drink per week. He reports that he does not use drugs.      Objective:     Physical Exam  Constitutional:       Appearance: He is well-developed.      Comments: /86   Pulse 96   Ht 6' (1.829 m)   Wt 114.8 kg (253 lb 1.4 oz)   BMI 34.32 kg/m²      HENT:      Head: Normocephalic.   Neck:      Vascular: No JVD.   Cardiovascular:      Rate and Rhythm: Normal rate and regular rhythm.      Heart sounds: S1 normal and S2 normal. No murmur heard.  Pulmonary:      Breath sounds: Normal breath sounds.   Chest:      Chest wall: There is no dullness to percussion.   Abdominal:      Palpations: Abdomen is soft. There is no hepatomegaly, splenomegaly or mass.   Musculoskeletal:      Right lower leg: No edema.      Left lower leg: No edema.   Skin:     Findings: No bruising.      Nails: There is no clubbing.   Neurological:      Mental Status: He is alert and oriented to person, place, and time.      Gait: Gait normal.   Psychiatric:        "  Speech: Speech normal.         Behavior: Behavior normal.         Lab Results   Component Value Date    CHOL 177 10/08/2021    HDL 42 10/08/2021    LDLCALC 116.0 10/08/2021    TRIG 95 10/08/2021    CHOLHDL 23.7 10/08/2021     Lab Results   Component Value Date     06/21/2022    CREATININE 1.1 06/21/2022    BUN 7 06/21/2022     06/21/2022    K 3.9 06/21/2022     06/21/2022    CO2 26 06/21/2022     Lab Results   Component Value Date    ALT 16 06/21/2022    AST 22 06/21/2022    ALKPHOS 61 06/21/2022    BILITOT 0.9 06/21/2022       Results for orders placed during the hospital encounter of 08/24/20    Echo Color Flow Doppler? Yes    Interpretation Summary  · Eccentric left ventricular hypertrophy.  · Severely decreased left ventricular systolic function. The estimated ejection fraction is 10%.  · Severe left ventricular enlargement.  · Grade III (severe) left ventricular diastolic dysfunction consistent with restrictive physiology.  · No wall motion abnormalities.  · Moderately to severely reduced right ventricular systolic function.  · Moderate right ventricular enlargement.  · Mild mitral regurgitation.  · Mild tricuspid regurgitation.  · Elevated central venous pressure (15 mmHg).       No valid procedures specified.        Assessment and Plan:       ICD-10-CM ICD-9-CM   1. Nonischemic cardiomyopathy  I42.8 425.4   2. Chronic systolic heart failure  I50.22 428.22   3. Mixed hyperlipidemia  E78.2 272.2        He had biventricular systolic dysfunction.  On most recent echocardiogram obtained at an outside facility LV enlarged and dysfunction have resolved and RV function is improved.  RV size was reported to be mildly enlarged.  Switch losartan back to sacubitril/valsartan.  Continue carvedilol and furosemide.  He is not taking spironolactone or a SGLT2 inhibitor.  I see no reason to add those at the present time.    Salt restriction.  Triglycerides elevated at the present time but have been within  normal limits in the past.  Cholesterol Education.  Continue current dose of rosuvastatin.    Orders placed during this encounter:     Nonischemic cardiomyopathy  -     furosemide (LASIX) 40 MG tablet; Take 1 tablet (40 mg total) by mouth once daily.  Dispense: 90 tablet; Refill: 3  -     Comprehensive Metabolic Panel; Future; Expected date: 11/29/2023  -     EKG 12-lead; Future; Expected date: 11/29/2023  -     Echo; Future; Expected date: 11/29/2023    Chronic systolic heart failure  -     sacubitriL-valsartan (ENTRESTO) 24-26 mg per tablet; Take 1 tablet by mouth 2 (two) times daily.  Dispense: 90 tablet; Refill: 3    Mixed hyperlipidemia  -     Lipid Panel; Future; Expected date: 11/29/2023       Follow up in about 1 year (around 11/29/2023).

## 2023-01-16 DIAGNOSIS — J45.909 ASTHMA, UNSPECIFIED ASTHMA SEVERITY, UNSPECIFIED WHETHER COMPLICATED, UNSPECIFIED WHETHER PERSISTENT: ICD-10-CM

## 2023-01-16 NOTE — TELEPHONE ENCOUNTER
Care Due:                  Date            Visit Type   Department     Provider  --------------------------------------------------------------------------------                                EP -                              PRIMARY      MET INTERNAL  Last Visit: 01-      [facility]   MEDICINE       Loki Alba  Next Visit: None Scheduled  None         None Found                                                            Last  Test          Frequency    Reason                     Performed    Due Date  --------------------------------------------------------------------------------    Office Visit  12 months..  albuterol, omeprazole,     01-   01-                             rosuvastatin.............    Lipid Panel.  12 months..  rosuvastatin.............  10-   10-    [facility] Embedded Care Gaps. Reference number: [id number]. 1/16/2023   12:29:10 PM CST

## 2023-01-17 RX ORDER — ALBUTEROL SULFATE 90 UG/1
1-2 AEROSOL, METERED RESPIRATORY (INHALATION) EVERY 4 HOURS PRN
Qty: 25.5 G | Refills: 0 | Status: SHIPPED | OUTPATIENT
Start: 2023-01-17

## 2023-01-17 NOTE — TELEPHONE ENCOUNTER
Refill Decision Note   Dev Sweeney  is requesting a refill authorization.  Brief Assessment and Rationale for Refill:  Approve    -Medication-Related Problems Identified:   Requires labs  Requires appointment  Medication Therapy Plan:  Lab: lipid panel    Medication Reconciliation Completed: No   Comments:     Provider Staff:     Action is required for this patient.   Please see care gap opportunities below in Care Due Message.     Thanks!  [facility]     Appointments      Date Provider   Last Visit   1/10/2022 Loki Alba DO   Next Visit   Visit date not found Loki Alba DO     Note composed:1:39 PM 01/17/2023           Note composed:1:39 PM 01/17/2023

## 2023-04-03 ENCOUNTER — TELEPHONE (OUTPATIENT)
Dept: UROLOGY | Facility: CLINIC | Age: 48
End: 2023-04-03
Payer: COMMERCIAL

## 2023-04-03 ENCOUNTER — PATIENT MESSAGE (OUTPATIENT)
Dept: UROLOGY | Facility: CLINIC | Age: 48
End: 2023-04-03
Payer: COMMERCIAL

## 2023-04-03 DIAGNOSIS — E29.1 HYPOGONADISM MALE: Primary | ICD-10-CM

## 2023-04-03 RX ORDER — TESTOSTERONE CYPIONATE 200 MG/ML
300 INJECTION, SOLUTION INTRAMUSCULAR
Qty: 5 ML | Refills: 1 | Status: SHIPPED | OUTPATIENT
Start: 2023-04-03 | End: 2023-05-03 | Stop reason: SDUPTHER

## 2023-04-03 NOTE — TELEPHONE ENCOUNTER
Requesting T refills. Refills x 1 given. Needs labs and f/u appt with me ([facility] or [facility], pt preference). Last seen >1 year ago. Labs ordered.

## 2023-04-14 ENCOUNTER — LAB VISIT (OUTPATIENT)
Dept: LAB | Facility: HOSPITAL | Age: 48
End: 2023-04-14
Attending: UROLOGY
Payer: COMMERCIAL

## 2023-04-14 DIAGNOSIS — E29.1 HYPOGONADISM MALE: ICD-10-CM

## 2023-04-14 LAB
BASOPHILS # BLD AUTO: 0.06 K/UL (ref 0–0.2)
BASOPHILS NFR BLD: 1.1 % (ref 0–1.9)
COMPLEXED PSA SERPL-MCNC: 0.6 NG/ML (ref 0–4)
DIFFERENTIAL METHOD: ABNORMAL
EOSINOPHIL # BLD AUTO: 0.1 K/UL (ref 0–0.5)
EOSINOPHIL NFR BLD: 2.5 % (ref 0–8)
ERYTHROCYTE [DISTWIDTH] IN BLOOD BY AUTOMATED COUNT: 20.8 % (ref 11.5–14.5)
HCT VFR BLD AUTO: 42.4 % (ref 40–54)
HGB BLD-MCNC: 11.7 G/DL (ref 14–18)
IMM GRANULOCYTES # BLD AUTO: 0.02 K/UL (ref 0–0.04)
IMM GRANULOCYTES NFR BLD AUTO: 0.4 % (ref 0–0.5)
LYMPHOCYTES # BLD AUTO: 1.2 K/UL (ref 1–4.8)
LYMPHOCYTES NFR BLD: 20.9 % (ref 18–48)
MCH RBC QN AUTO: 19.8 PG (ref 27–31)
MCHC RBC AUTO-ENTMCNC: 27.6 G/DL (ref 32–36)
MCV RBC AUTO: 72 FL (ref 82–98)
MONOCYTES # BLD AUTO: 0.4 K/UL (ref 0.3–1)
MONOCYTES NFR BLD: 8 % (ref 4–15)
NEUTROPHILS # BLD AUTO: 3.7 K/UL (ref 1.8–7.7)
NEUTROPHILS NFR BLD: 67.1 % (ref 38–73)
NRBC BLD-RTO: 0 /100 WBC
PLATELET # BLD AUTO: 191 K/UL (ref 150–450)
PMV BLD AUTO: 10 FL (ref 9.2–12.9)
RBC # BLD AUTO: 5.9 M/UL (ref 4.6–6.2)
TESTOST SERPL-MCNC: 1084 NG/DL (ref 304–1227)
WBC # BLD AUTO: 5.5 K/UL (ref 3.9–12.7)

## 2023-04-14 PROCEDURE — 36415 COLL VENOUS BLD VENIPUNCTURE: CPT | Mod: PO | Performed by: UROLOGY

## 2023-04-14 PROCEDURE — 85025 COMPLETE CBC W/AUTO DIFF WBC: CPT | Performed by: UROLOGY

## 2023-04-14 PROCEDURE — 84153 ASSAY OF PSA TOTAL: CPT | Performed by: UROLOGY

## 2023-04-14 PROCEDURE — 84403 ASSAY OF TOTAL TESTOSTERONE: CPT | Performed by: UROLOGY

## 2023-05-03 ENCOUNTER — OFFICE VISIT (OUTPATIENT)
Dept: UROLOGY | Facility: CLINIC | Age: 48
End: 2023-05-03
Attending: UROLOGY
Payer: COMMERCIAL

## 2023-05-03 DIAGNOSIS — E29.1 HYPOGONADISM MALE: Primary | ICD-10-CM

## 2023-05-03 PROCEDURE — 4010F PR ACE/ARB THEARPY RXD/TAKEN: ICD-10-PCS | Mod: CPTII,95,, | Performed by: UROLOGY

## 2023-05-03 PROCEDURE — 1160F PR REVIEW ALL MEDS BY PRESCRIBER/CLIN PHARMACIST DOCUMENTED: ICD-10-PCS | Mod: CPTII,95,, | Performed by: UROLOGY

## 2023-05-03 PROCEDURE — 99213 PR OFFICE/OUTPT VISIT, EST, LEVL III, 20-29 MIN: ICD-10-PCS | Mod: 95,,, | Performed by: UROLOGY

## 2023-05-03 PROCEDURE — 1159F MED LIST DOCD IN RCRD: CPT | Mod: CPTII,95,, | Performed by: UROLOGY

## 2023-05-03 PROCEDURE — 1160F RVW MEDS BY RX/DR IN RCRD: CPT | Mod: CPTII,95,, | Performed by: UROLOGY

## 2023-05-03 PROCEDURE — 1159F PR MEDICATION LIST DOCUMENTED IN MEDICAL RECORD: ICD-10-PCS | Mod: CPTII,95,, | Performed by: UROLOGY

## 2023-05-03 PROCEDURE — 99213 OFFICE O/P EST LOW 20 MIN: CPT | Mod: 95,,, | Performed by: UROLOGY

## 2023-05-03 PROCEDURE — 4010F ACE/ARB THERAPY RXD/TAKEN: CPT | Mod: CPTII,95,, | Performed by: UROLOGY

## 2023-05-03 RX ORDER — TESTOSTERONE CYPIONATE 200 MG/ML
300 INJECTION, SOLUTION INTRAMUSCULAR
Qty: 10 ML | Refills: 5 | Status: SHIPPED | OUTPATIENT
Start: 2023-05-03 | End: 2023-11-27

## 2023-05-03 NOTE — PROGRESS NOTES
Subjective:       Dev Sweeney is a 47 y.o. male who is an established patient who was referred by Dr. Loki Alba  for evaluation of low T.      Recent blood work with low testosterone. Also with significantly low T in chart from 2004. Reports decreased energy and low libido. Also with ED issues. Took Viagra in the past from online purchase that was helpful. Not working out as much as previously. Vasectomy in 2004.      PMH: CHF (EF 10% initially, recently reported to be 45%), HTN, smoker    10/21 T  - 102 (2004 - 106), PSA - 0.62   10/21 T - 2355, low FSH/LJ  11/21 T - 152, free 31.3  2/22 T - 111, PSA - 0.30 (6 days prior to next injection)  4/23 T - 1084, PSA - 0.6, Hgb - 11.7     Repeat T check shows a drastically elevated testosterone (2355). LH and FSH low. Prolactin normal. He  reports taking exogenous testosterone from someone at the gym. Two doses in one week. He may have had some increased energy and increased sex drive noted in the last week.     Virtual visit. Started on TRT injections - 400mg q4wks. He has noted improvement of symptoms though notes low T symptoms return at 2 weeks.     5/3/2023  Virtual visit. Doing well with TRT 300mg a1gsrao. Doing well with this regimen. Labs good.        The following portions of the patient's history were reviewed and updated as appropriate: allergies, current medications, past family history, past medical history, past social history, past surgical history and problem list.    Review of Systems  Twelve point review of systems completed. Pertinent positive and negatives listed in HPI.      Objective:    Vitals: There were no vitals taken for this visit.    Physical Exam - not done today - virtual visit  General: well developed, well nourished in no acute distress  Head: normocephalic, atraumatic  Neck: supple, trachea midline, no obvious enlargement of thyroid  HEENT: EOMI, mucus membranes moist, sclera anicteric, no hearing impairment  Lungs:  symmetric expansion, non-labored breathing  Skin: no rashes or lesions  Neuro: alert and oriented x 3, no gross deficits  Psych: normal judgment and insight, normal mood/affect and non-anxious  Genitourinary: (last visit)  Penis -  circumcised penis without plaques, lesions, or scarring.  Urethra - orthotopic location without stenosis.  Scrotum  - no lesions or rashes, no hydrocele or hernia.  Testes - descended bilaterally, symmetric without masses, non tender.  Epididymides - no cysts or lesions, no spermatocele, symmetric.   CORINNE: deferred      Lab Review   Urine analysis today in clinic shows no urine     Lab Results   Component Value Date    WBC 5.50 04/14/2023    HGB 11.7 (L) 04/14/2023    HCT 42.4 04/14/2023    MCV 72 (L) 04/14/2023     04/14/2023     Lab Results   Component Value Date    CREATININE 1.1 06/21/2022    BUN 7 06/21/2022     Lab Results   Component Value Date    PSA 0.62 10/08/2021     Lab Results   Component Value Date    PSADIAG 0.60 04/14/2023       Imaging  NA         Assessment/Plan:      1. Hypogonadism male    - Significantly low total T level   - Normal  exam   - Testosterone panel   - Prolactin, FSH, LH   - May need endocrine referral   - May need PDE5i for ED in addition to TRT     - T level now very elevated after taking exogenous testosterone. Cautioned on use.   - TRT started 11/21 - prefers self-injection.    - 400mg q28d to start. Wearing off too soon.    - Changed dosing to 300mg q2wks - doing well. Refilled   - Recheck T level 12 months.        - Discussed hypogonadism, common symptoms, treatment options, and the risks and benefits of treatment.  His symptoms and blood tests support the diagnosis and therefore treatment is appropriate.   - Discussed the various risks associated with TRT, specifically a possible increase in risk of heart disease, MI, CVA, PE, DVT. Also discussed the issues related to testosterone replacement and prostate cancer. TRT does not increase his  risk of developing cancer. Recommend annual CORINNE and PSA for PCa screening. TRT recommended to stop if abnormal CORINNE or elevated PSA.   - Treatment options reviewed: regular injections, topical treatments including gels and creams, and implants such as TestoPel.  Risks and benefits of each were reviewed specifically T fluctuations with q2-4week injections and risk of transfer of medication to other with topical application.         Follow up 12 months with labs      The patient location is: [place]  The chief complaint leading to consultation is: low T    Visit type: audio only    Face to Face time with patient: 8min  15 minutes of total time spent on the encounter, which includes face to face time and non-face to face time preparing to see the patient (eg, review of tests), Obtaining and/or reviewing separately obtained history, Documenting clinical information in the electronic or other health record, Independently interpreting results (not separately reported) and communicating results to the patient/family/caregiver, or Care coordination (not separately reported).         Each patient to whom he or she provides medical services by telemedicine is:  (1) informed of the relationship between the physician and patient and the respective role of any other health care provider with respect to management of the patient; and (2) notified that he or she may decline to receive medical services by telemedicine and may withdraw from such care at any time.    Notes:

## 2023-05-05 RX ORDER — CLONAZEPAM 0.5 MG/1
0.5 TABLET ORAL NIGHTLY PRN
Qty: 30 TABLET | Refills: 3 | Status: SHIPPED | OUTPATIENT
Start: 2023-05-05 | End: 2023-09-06 | Stop reason: SDUPTHER

## 2023-05-05 NOTE — TELEPHONE ENCOUNTER
No care due was identified.  [facility] Embedded Care Due Messages. Reference number: [id number].   5/05/2023 2:19:07 PM CDT

## 2023-08-19 ENCOUNTER — PATIENT MESSAGE (OUTPATIENT)
Dept: INTERNAL MEDICINE | Facility: CLINIC | Age: 48
End: 2023-08-19
Payer: COMMERCIAL

## 2023-08-21 ENCOUNTER — TELEPHONE (OUTPATIENT)
Dept: INTERNAL MEDICINE | Facility: CLINIC | Age: 48
End: 2023-08-21
Payer: COMMERCIAL

## 2023-08-21 DIAGNOSIS — Z00.00 ANNUAL PHYSICAL EXAM: ICD-10-CM

## 2023-08-21 DIAGNOSIS — I10 HYPERTENSION, UNSPECIFIED TYPE: Primary | ICD-10-CM

## 2023-09-06 ENCOUNTER — LAB VISIT (OUTPATIENT)
Dept: LAB | Facility: HOSPITAL | Age: 48
End: 2023-09-06
Attending: INTERNAL MEDICINE
Payer: COMMERCIAL

## 2023-09-06 ENCOUNTER — OFFICE VISIT (OUTPATIENT)
Dept: INTERNAL MEDICINE | Facility: CLINIC | Age: 48
End: 2023-09-06
Payer: COMMERCIAL

## 2023-09-06 VITALS
WEIGHT: 245.13 LBS | HEART RATE: 98 BPM | RESPIRATION RATE: 17 BRPM | DIASTOLIC BLOOD PRESSURE: 85 MMHG | TEMPERATURE: 98 F | HEIGHT: 72 IN | SYSTOLIC BLOOD PRESSURE: 120 MMHG | OXYGEN SATURATION: 97 % | BODY MASS INDEX: 33.2 KG/M2

## 2023-09-06 DIAGNOSIS — K21.9 GASTROESOPHAGEAL REFLUX DISEASE, UNSPECIFIED WHETHER ESOPHAGITIS PRESENT: ICD-10-CM

## 2023-09-06 DIAGNOSIS — Z00.00 ANNUAL PHYSICAL EXAM: ICD-10-CM

## 2023-09-06 DIAGNOSIS — I10 HYPERTENSION, UNSPECIFIED TYPE: ICD-10-CM

## 2023-09-06 DIAGNOSIS — I50.42 CHRONIC COMBINED SYSTOLIC AND DIASTOLIC HEART FAILURE: ICD-10-CM

## 2023-09-06 DIAGNOSIS — K44.9 HIATAL HERNIA: ICD-10-CM

## 2023-09-06 DIAGNOSIS — G47.00 INSOMNIA, UNSPECIFIED TYPE: ICD-10-CM

## 2023-09-06 DIAGNOSIS — Z00.00 ANNUAL PHYSICAL EXAM: Primary | ICD-10-CM

## 2023-09-06 LAB
ALBUMIN SERPL BCP-MCNC: 4.2 G/DL (ref 3.5–5.2)
ALP SERPL-CCNC: 52 U/L (ref 55–135)
ALT SERPL W/O P-5'-P-CCNC: 15 U/L (ref 10–44)
ANION GAP SERPL CALC-SCNC: 12 MMOL/L (ref 8–16)
AST SERPL-CCNC: 21 U/L (ref 10–40)
BASOPHILS # BLD AUTO: 0.1 K/UL (ref 0–0.2)
BASOPHILS NFR BLD: 1.4 % (ref 0–1.9)
BILIRUB SERPL-MCNC: 1 MG/DL (ref 0.1–1)
BUN SERPL-MCNC: 8 MG/DL (ref 6–20)
CALCIUM SERPL-MCNC: 9.2 MG/DL (ref 8.7–10.5)
CHLORIDE SERPL-SCNC: 103 MMOL/L (ref 95–110)
CHOLEST SERPL-MCNC: 120 MG/DL (ref 120–199)
CHOLEST/HDLC SERPL: 3.2 {RATIO} (ref 2–5)
CO2 SERPL-SCNC: 26 MMOL/L (ref 23–29)
COMPLEXED PSA SERPL-MCNC: 0.55 NG/ML (ref 0–4)
CREAT SERPL-MCNC: 0.8 MG/DL (ref 0.5–1.4)
DIFFERENTIAL METHOD: ABNORMAL
EOSINOPHIL # BLD AUTO: 0.2 K/UL (ref 0–0.5)
EOSINOPHIL NFR BLD: 2.2 % (ref 0–8)
ERYTHROCYTE [DISTWIDTH] IN BLOOD BY AUTOMATED COUNT: 21.8 % (ref 11.5–14.5)
EST. GFR  (NO RACE VARIABLE): >60 ML/MIN/1.73 M^2
ESTIMATED AVG GLUCOSE: 103 MG/DL (ref 68–131)
GLUCOSE SERPL-MCNC: 81 MG/DL (ref 70–110)
HBA1C MFR BLD: 5.2 % (ref 4–5.6)
HCT VFR BLD AUTO: 42.1 % (ref 40–54)
HDLC SERPL-MCNC: 38 MG/DL (ref 40–75)
HDLC SERPL: 31.7 % (ref 20–50)
HGB BLD-MCNC: 12.1 G/DL (ref 14–18)
IMM GRANULOCYTES # BLD AUTO: 0.03 K/UL (ref 0–0.04)
IMM GRANULOCYTES NFR BLD AUTO: 0.4 % (ref 0–0.5)
LDLC SERPL CALC-MCNC: 57 MG/DL (ref 63–159)
LYMPHOCYTES # BLD AUTO: 1.3 K/UL (ref 1–4.8)
LYMPHOCYTES NFR BLD: 17.4 % (ref 18–48)
MCH RBC QN AUTO: 20.4 PG (ref 27–31)
MCHC RBC AUTO-ENTMCNC: 28.7 G/DL (ref 32–36)
MCV RBC AUTO: 71 FL (ref 82–98)
MONOCYTES # BLD AUTO: 1 K/UL (ref 0.3–1)
MONOCYTES NFR BLD: 13.9 % (ref 4–15)
NEUTROPHILS # BLD AUTO: 4.6 K/UL (ref 1.8–7.7)
NEUTROPHILS NFR BLD: 64.7 % (ref 38–73)
NONHDLC SERPL-MCNC: 82 MG/DL
NRBC BLD-RTO: 0 /100 WBC
PLATELET # BLD AUTO: 214 K/UL (ref 150–450)
PMV BLD AUTO: 10.2 FL (ref 9.2–12.9)
POTASSIUM SERPL-SCNC: 3.9 MMOL/L (ref 3.5–5.1)
PROT SERPL-MCNC: 7.1 G/DL (ref 6–8.4)
RBC # BLD AUTO: 5.93 M/UL (ref 4.6–6.2)
SODIUM SERPL-SCNC: 141 MMOL/L (ref 136–145)
TRIGL SERPL-MCNC: 125 MG/DL (ref 30–150)
TSH SERPL DL<=0.005 MIU/L-ACNC: 2.08 UIU/ML (ref 0.4–4)
WBC # BLD AUTO: 7.18 K/UL (ref 3.9–12.7)

## 2023-09-06 PROCEDURE — 3079F PR MOST RECENT DIASTOLIC BLOOD PRESSURE 80-89 MM HG: ICD-10-PCS | Mod: CPTII,S$GLB,, | Performed by: INTERNAL MEDICINE

## 2023-09-06 PROCEDURE — 99396 PREV VISIT EST AGE 40-64: CPT | Mod: S$GLB,,, | Performed by: INTERNAL MEDICINE

## 2023-09-06 PROCEDURE — 36415 COLL VENOUS BLD VENIPUNCTURE: CPT | Mod: PO | Performed by: INTERNAL MEDICINE

## 2023-09-06 PROCEDURE — 3074F SYST BP LT 130 MM HG: CPT | Mod: CPTII,S$GLB,, | Performed by: INTERNAL MEDICINE

## 2023-09-06 PROCEDURE — 3008F PR BODY MASS INDEX (BMI) DOCUMENTED: ICD-10-PCS | Mod: CPTII,S$GLB,, | Performed by: INTERNAL MEDICINE

## 2023-09-06 PROCEDURE — 4010F PR ACE/ARB THEARPY RXD/TAKEN: ICD-10-PCS | Mod: CPTII,S$GLB,, | Performed by: INTERNAL MEDICINE

## 2023-09-06 PROCEDURE — 80061 LIPID PANEL: CPT | Performed by: INTERNAL MEDICINE

## 2023-09-06 PROCEDURE — 85025 COMPLETE CBC W/AUTO DIFF WBC: CPT | Performed by: INTERNAL MEDICINE

## 2023-09-06 PROCEDURE — 83036 HEMOGLOBIN GLYCOSYLATED A1C: CPT | Performed by: INTERNAL MEDICINE

## 2023-09-06 PROCEDURE — 1159F MED LIST DOCD IN RCRD: CPT | Mod: CPTII,S$GLB,, | Performed by: INTERNAL MEDICINE

## 2023-09-06 PROCEDURE — 1160F RVW MEDS BY RX/DR IN RCRD: CPT | Mod: CPTII,S$GLB,, | Performed by: INTERNAL MEDICINE

## 2023-09-06 PROCEDURE — 99396 PR PREVENTIVE VISIT,EST,40-64: ICD-10-PCS | Mod: S$GLB,,, | Performed by: INTERNAL MEDICINE

## 2023-09-06 PROCEDURE — 3074F PR MOST RECENT SYSTOLIC BLOOD PRESSURE < 130 MM HG: ICD-10-PCS | Mod: CPTII,S$GLB,, | Performed by: INTERNAL MEDICINE

## 2023-09-06 PROCEDURE — 1159F PR MEDICATION LIST DOCUMENTED IN MEDICAL RECORD: ICD-10-PCS | Mod: CPTII,S$GLB,, | Performed by: INTERNAL MEDICINE

## 2023-09-06 PROCEDURE — 3008F BODY MASS INDEX DOCD: CPT | Mod: CPTII,S$GLB,, | Performed by: INTERNAL MEDICINE

## 2023-09-06 PROCEDURE — 99999 PR PBB SHADOW E&M-EST. PATIENT-LVL IV: ICD-10-PCS | Mod: PBBFAC,,, | Performed by: INTERNAL MEDICINE

## 2023-09-06 PROCEDURE — 84153 ASSAY OF PSA TOTAL: CPT | Performed by: INTERNAL MEDICINE

## 2023-09-06 PROCEDURE — 99999 PR PBB SHADOW E&M-EST. PATIENT-LVL IV: CPT | Mod: PBBFAC,,, | Performed by: INTERNAL MEDICINE

## 2023-09-06 PROCEDURE — 3079F DIAST BP 80-89 MM HG: CPT | Mod: CPTII,S$GLB,, | Performed by: INTERNAL MEDICINE

## 2023-09-06 PROCEDURE — 84443 ASSAY THYROID STIM HORMONE: CPT | Performed by: INTERNAL MEDICINE

## 2023-09-06 PROCEDURE — 1160F PR REVIEW ALL MEDS BY PRESCRIBER/CLIN PHARMACIST DOCUMENTED: ICD-10-PCS | Mod: CPTII,S$GLB,, | Performed by: INTERNAL MEDICINE

## 2023-09-06 PROCEDURE — 4010F ACE/ARB THERAPY RXD/TAKEN: CPT | Mod: CPTII,S$GLB,, | Performed by: INTERNAL MEDICINE

## 2023-09-06 PROCEDURE — 80053 COMPREHEN METABOLIC PANEL: CPT | Performed by: INTERNAL MEDICINE

## 2023-09-06 RX ORDER — CLONAZEPAM 0.5 MG/1
0.5 TABLET ORAL NIGHTLY PRN
Qty: 30 TABLET | Refills: 3 | Status: SHIPPED | OUTPATIENT
Start: 2023-09-06 | End: 2024-01-17

## 2023-09-06 NOTE — PROGRESS NOTES
Subjective     Patient ID: Dev Sweeney is a 48 y.o. male.    Chief Complaint: Annual Exam    HPI  48 y.o. Male here for annual exam.      Vaccines: Influenza (declined); Tetanus (2015)  Eye exam: declined  Colonoscopy: 8/22     Exercise: walks/weights  Diet: regular     Past Medical History:  No date: CHF  No date: Allergy  No date: Asthma  No date: hypogonadism   No date: GERD (gastroesophageal reflux disease)  No date: Hiatal hernia  No date: Hyperlipidemia  No date: Hypertension  No date: Insomnia  Past Surgical History:  No date: FRACTURE SURGERY  No date: HERNIA REPAIR  No date: ORBITAL FRACTURE SURGERY      Comment:  left  No date: VASECTOMY  Social History    Socioeconomic History      Marital status:       Spouse name: Not on file      Number of children: Not on file      Years of education: Not on file      Highest education level: Not on file    Occupational History      Not on file    Social Needs      Financial resource strain: Not on file      Food insecurity:        Worry: Not on file        Inability: Not on file      Transportation needs:        Medical: Not on file        Non-medical: Not on file    Tobacco Use      Smoking status: Current Some Day Smoker        Packs/day: 1.00        Years: 15.00        Pack years: 15      Smokeless tobacco: Never Used      Tobacco comment: trying to quit    Substance and Sexual Activity      Alcohol use: Yes        Alcohol/week: 1.0 standard drinks        Types: 1 Shots of liquor per week      Drug use: No      Sexual activity: Not on file    Lifestyle      Physical activity:        Days per week: 5 days        Minutes per session: 30 min      Stress: To some extent    Relationships      Social connections:        Talks on phone: Patient refused        Gets together: Patient refused        Attends Shinto service: Not on file        Active member of club or organization: No        Attends meetings of clubs or organizations: Patient refused         Relationship status:     Other Topics      Concerns:        Not on file    Social History Narrative      Not on file     Review of patient's allergies indicates:   -- Penicillins -- Anaphylaxis    --  Other reaction(s): Hives             Other reaction(s): Difficulty breathing  Review of Systems   Constitutional:  Negative for activity change, appetite change, chills, diaphoresis, fatigue, fever and unexpected weight change.   HENT:  Negative for nasal congestion, hearing loss, mouth sores, postnasal drip, rhinorrhea, sinus pressure/congestion, sneezing, sore throat, trouble swallowing and voice change.    Eyes:  Negative for discharge, itching and visual disturbance.   Respiratory:  Negative for cough, chest tightness, shortness of breath and wheezing.    Cardiovascular:  Negative for chest pain, palpitations and leg swelling.   Gastrointestinal:  Negative for abdominal pain, blood in stool, constipation, diarrhea, nausea and vomiting.   Endocrine: Negative for cold intolerance, heat intolerance, polydipsia and polyuria.   Genitourinary:  Negative for difficulty urinating, dysuria, flank pain, hematuria and urgency.   Musculoskeletal:  Negative for arthralgias, back pain, joint swelling, myalgias and neck pain.   Integumentary:  Negative for rash and wound.   Allergic/Immunologic: Negative for environmental allergies and food allergies.   Neurological:  Negative for dizziness, tremors, seizures, syncope, weakness and headaches.   Hematological:  Negative for adenopathy. Does not bruise/bleed easily.   Psychiatric/Behavioral:  Negative for confusion, dysphoric mood, sleep disturbance and suicidal ideas. The patient is not nervous/anxious.           Objective     Physical Exam  Constitutional:       General: He is not in acute distress.     Appearance: Normal appearance. He is well-developed. He is not ill-appearing, toxic-appearing or diaphoretic.   HENT:      Head: Normocephalic and atraumatic.      Right  Ear: External ear normal.      Left Ear: External ear normal.      Nose: Nose normal.      Mouth/Throat:      Pharynx: No oropharyngeal exudate.   Eyes:      General: No scleral icterus.        Right eye: No discharge.         Left eye: No discharge.      Extraocular Movements: Extraocular movements intact.      Conjunctiva/sclera: Conjunctivae normal.      Pupils: Pupils are equal, round, and reactive to light.   Neck:      Thyroid: No thyromegaly.      Vascular: No JVD.   Cardiovascular:      Rate and Rhythm: Normal rate and regular rhythm.      Pulses: Normal pulses.      Heart sounds: Normal heart sounds. No murmur heard.  Pulmonary:      Effort: Pulmonary effort is normal. No respiratory distress.      Breath sounds: Normal breath sounds. No wheezing or rales.   Abdominal:      General: Bowel sounds are normal. There is no distension.      Palpations: Abdomen is soft.      Tenderness: There is no abdominal tenderness. There is no right CVA tenderness, left CVA tenderness, guarding or rebound.   Musculoskeletal:      Cervical back: Normal range of motion and neck supple. No rigidity.      Right lower leg: No edema.      Left lower leg: No edema.   Lymphadenopathy:      Cervical: No cervical adenopathy.   Skin:     General: Skin is warm and dry.      Capillary Refill: Capillary refill takes less than 2 seconds.      Coloration: Skin is not pale.      Findings: No rash.   Neurological:      General: No focal deficit present.      Mental Status: He is alert and oriented to person, place, and time. Mental status is at baseline.      Cranial Nerves: No cranial nerve deficit.      Sensory: No sensory deficit.      Motor: No weakness.      Coordination: Coordination normal.      Gait: Gait normal.      Deep Tendon Reflexes: Reflexes normal.   Psychiatric:         Mood and Affect: Mood normal.         Behavior: Behavior normal.         Thought Content: Thought content normal.         Judgment: Judgment normal.             Assessment and Plan     1. Annual physical exam    2. Insomnia, unspecified type  -     clonazePAM (KLONOPIN) 0.5 MG tablet; Take 1 tablet (0.5 mg total) by mouth nightly as needed (insomnia).  Dispense: 30 tablet; Refill: 3    3. Chronic combined systolic and diastolic heart failure    4. Hypertension, unspecified type    5. Gastroesophageal reflux disease, unspecified whether esophagitis present         Blood work ordered      HTN- stable on current meds      Insomnia- stable on Klonopin 0.5 mg qHS PRN, d/c Elavil      CHF- no s/s of volume overload on exam   -followed by cardio     GERD/Hiatal hernia- stable on PPI     F/u in 1 yr

## 2023-09-11 ENCOUNTER — TELEPHONE (OUTPATIENT)
Dept: INTERNAL MEDICINE | Facility: CLINIC | Age: 48
End: 2023-09-11

## 2023-09-11 DIAGNOSIS — D50.9 MICROCYTIC ANEMIA: Primary | ICD-10-CM

## 2023-10-05 RX ORDER — ROSUVASTATIN CALCIUM 10 MG/1
TABLET, COATED ORAL
Qty: 90 TABLET | Refills: 3 | Status: SHIPPED | OUTPATIENT
Start: 2023-10-05

## 2023-10-05 NOTE — TELEPHONE ENCOUNTER
No care due was identified.  [facility] Embedded Care Due Messages. Reference number: [id number].   10/05/2023 4:25:38 PM CDT

## 2023-10-05 NOTE — TELEPHONE ENCOUNTER
Refill Decision Note   Dev Sweeney  is requesting a refill authorization.  Brief Assessment and Rationale for Refill:  Approve     Medication Therapy Plan:         Comments:     Note composed:4:29 PM 10/05/2023

## 2023-10-07 DIAGNOSIS — I42.8 NONISCHEMIC CARDIOMYOPATHY: ICD-10-CM

## 2023-10-08 RX ORDER — FUROSEMIDE 40 MG/1
40 TABLET ORAL
Qty: 90 TABLET | Refills: 3 | Status: SHIPPED | OUTPATIENT
Start: 2023-10-08

## 2023-11-27 RX ORDER — TESTOSTERONE CYPIONATE 200 MG/ML
300 INJECTION, SOLUTION INTRAMUSCULAR
Qty: 10 ML | Refills: 5 | Status: SHIPPED | OUTPATIENT
Start: 2023-11-27 | End: 2024-05-27

## 2024-01-01 DIAGNOSIS — I42.8 NONISCHEMIC CARDIOMYOPATHY: Primary | ICD-10-CM

## 2024-01-01 DIAGNOSIS — I50.22 CHRONIC SYSTOLIC HEART FAILURE: ICD-10-CM

## 2024-01-01 RX ORDER — SACUBITRIL AND VALSARTAN 24; 26 MG/1; MG/1
TABLET, FILM COATED ORAL
Qty: 60 TABLET | Refills: 3 | Status: SHIPPED | OUTPATIENT
Start: 2024-01-01

## 2024-01-16 DIAGNOSIS — G47.00 INSOMNIA, UNSPECIFIED TYPE: ICD-10-CM

## 2024-01-17 RX ORDER — CLONAZEPAM 0.5 MG/1
0.5 TABLET ORAL NIGHTLY PRN
Qty: 30 TABLET | Refills: 3 | Status: SHIPPED | OUTPATIENT
Start: 2024-01-17 | End: 2024-05-27

## 2024-01-17 RX ORDER — CLONAZEPAM 0.5 MG/1
0.5 TABLET ORAL NIGHTLY PRN
Qty: 30 TABLET | OUTPATIENT
Start: 2024-01-17 | End: 2025-01-16

## 2024-01-17 NOTE — TELEPHONE ENCOUNTER
No care due was identified.  [facility] Embedded Care Due Messages. Reference number: [id number].   1/16/2024 7:35:45 PM CST

## 2024-01-17 NOTE — TELEPHONE ENCOUNTER
No care due was identified.  [facility] Embedded Care Due Messages. Reference number: [id number].   1/16/2024 8:57:01 PM CST

## 2024-02-05 ENCOUNTER — OFFICE VISIT (OUTPATIENT)
Dept: DERMATOLOGY | Facility: CLINIC | Age: 49
End: 2024-02-05
Payer: COMMERCIAL

## 2024-02-05 DIAGNOSIS — Z12.83 SCREENING EXAM FOR SKIN CANCER: ICD-10-CM

## 2024-02-05 DIAGNOSIS — B35.3 TINEA PEDIS, UNSPECIFIED LATERALITY: Primary | ICD-10-CM

## 2024-02-05 DIAGNOSIS — L82.1 SEBORRHEIC KERATOSES: ICD-10-CM

## 2024-02-05 DIAGNOSIS — L81.4 LENTIGO: ICD-10-CM

## 2024-02-05 PROCEDURE — 1160F RVW MEDS BY RX/DR IN RCRD: CPT | Mod: CPTII,S$GLB,, | Performed by: STUDENT IN AN ORGANIZED HEALTH CARE EDUCATION/TRAINING PROGRAM

## 2024-02-05 PROCEDURE — 99203 OFFICE O/P NEW LOW 30 MIN: CPT | Mod: S$GLB,,, | Performed by: STUDENT IN AN ORGANIZED HEALTH CARE EDUCATION/TRAINING PROGRAM

## 2024-02-05 PROCEDURE — 87107 FUNGI IDENTIFICATION MOLD: CPT | Performed by: STUDENT IN AN ORGANIZED HEALTH CARE EDUCATION/TRAINING PROGRAM

## 2024-02-05 PROCEDURE — 87101 SKIN FUNGI CULTURE: CPT | Performed by: STUDENT IN AN ORGANIZED HEALTH CARE EDUCATION/TRAINING PROGRAM

## 2024-02-05 PROCEDURE — 1159F MED LIST DOCD IN RCRD: CPT | Mod: CPTII,S$GLB,, | Performed by: STUDENT IN AN ORGANIZED HEALTH CARE EDUCATION/TRAINING PROGRAM

## 2024-02-05 PROCEDURE — 4010F ACE/ARB THERAPY RXD/TAKEN: CPT | Mod: CPTII,S$GLB,, | Performed by: STUDENT IN AN ORGANIZED HEALTH CARE EDUCATION/TRAINING PROGRAM

## 2024-02-05 PROCEDURE — 99999 PR PBB SHADOW E&M-EST. PATIENT-LVL III: CPT | Mod: PBBFAC,,, | Performed by: STUDENT IN AN ORGANIZED HEALTH CARE EDUCATION/TRAINING PROGRAM

## 2024-02-05 RX ORDER — TERBINAFINE HYDROCHLORIDE 250 MG/1
250 TABLET ORAL DAILY
Qty: 30 TABLET | Refills: 0 | Status: SHIPPED | OUTPATIENT
Start: 2024-02-05 | End: 2024-03-06

## 2024-02-05 RX ORDER — CICLOPIROX OLAMINE 7.7 MG/G
CREAM TOPICAL 2 TIMES DAILY
Qty: 90 G | Refills: 6 | Status: SHIPPED | OUTPATIENT
Start: 2024-02-05

## 2024-02-05 NOTE — PROGRESS NOTES
Subjective:      Patient ID:  Dev Sweeney is a 48 y.o. male who presents for   Chief Complaint   Patient presents with    Skin Check     TBSE     Patient here for Total Body Skin Exam    Last seen by dermatologist: Over 5 years ago    no - personal history of atypical moles removed  no - personal history of MM   no - family history of MM  no - childhood blistering sunburns  no - tanning bed use  no - personal history of NMSC    Patient with specific complaint of lesion(s)  Location: Spots on top of both hands & arms  Duration: Years  Symptoms: none  Relieving factors/Previous treatments: none     Pt also concerned about rash on both feet. Suspects athlete's foot. Pt stated using OTC antifungal and hydrocortisone cream but it did not help.         Review of Systems   Skin:  Positive for itching, rash, activity-related sunscreen use and wears hat (most of the time). Negative for daily sunscreen use.   Hematologic/Lymphatic: Does not bruise/bleed easily.       Objective:   Physical Exam   Constitutional: He appears well-developed and well-nourished. No distress.   Neurological: He is alert and oriented to person, place, and time. He is not disoriented.   Psychiatric: He has a normal mood and affect.   Skin:   Areas Examined (abnormalities noted in diagram):   Scalp / Hair Palpated and Inspected  Head / Face Inspection Performed  Neck Inspection Performed  Chest / Axilla Inspection Performed  Abdomen Inspection Performed  Genitals / Buttocks / Groin Inspection Performed  Back Inspection Performed  RUE Inspected  LUE Inspection Performed  RLE Inspected  LLE Inspection Performed  Nails and Digits Inspection Performed            Diagram Legend     Erythematous scaling macule/papule c/w actinic keratosis       Vascular papule c/w angioma      Pigmented verrucoid papule/plaque c/w seborrheic keratosis      Yellow umbilicated papule c/w sebaceous hyperplasia      Irregularly shaped tan macule c/w lentigo     1-2 mm  smooth white papules consistent with Milia      Movable subcutaneous cyst with punctum c/w epidermal inclusion cyst      Subcutaneous movable cyst c/w pilar cyst      Firm pink to brown papule c/w dermatofibroma      Pedunculated fleshy papule(s) c/w skin tag(s)      Evenly pigmented macule c/w junctional nevus     Mildly variegated pigmented, slightly irregular-bordered macule c/w mildly atypical nevus      Flesh colored to evenly pigmented papule c/w intradermal nevus       Pink pearly papule/plaque c/w basal cell carcinoma      Erythematous hyperkeratotic cursted plaque c/w SCC      Surgical scar with no sign of skin cancer recurrence      Open and closed comedones      Inflammatory papules and pustules      Verrucoid papule consistent consistent with wart     Erythematous eczematous patches and plaques     Dystrophic onycholytic nail with subungual debris c/w onychomycosis     Umbilicated papule    Erythematous-base heme-crusted tan verrucoid plaque consistent with inflamed seborrheic keratosis     Erythematous Silvery Scaling Plaque c/w Psoriasis     See annotation      Assessment / Plan:        Tinea pedis  - likely onychomycosis as well but patient does not want to take 3 month course of po terbinafine  - fungal culture  -     terbinafine HCL (LAMISIL) 250 mg tablet; Take 1 tablet (250 mg total) by mouth once daily.  Dispense: 30 tablet; Refill: 0  -     ciclopirox (LOPROX) 0.77 % Crea; Apply topically 2 (two) times daily. Apply to feet  Dispense: 90 g; Refill: 6    Seborrheic keratoses  Lentigo  Reassurance given to patient. No treatment is necessary.     Screening exam for skin cancer  Total body skin examination performed today including at least 12 points as noted in physical examination. No lesions suspicious for malignancy noted.    Recommend daily sun protection/avoidance, use of at least SPF 30, broad spectrum sunscreen (OTC drug), skin self examinations, and routine physician surveillance to optimize  early detection         Follow up if symptoms worsen or fail to improve.

## 2024-02-28 RX ORDER — OMEPRAZOLE 40 MG/1
CAPSULE, DELAYED RELEASE ORAL
Qty: 90 CAPSULE | Refills: 1 | Status: SHIPPED | OUTPATIENT
Start: 2024-02-28

## 2024-02-28 NOTE — TELEPHONE ENCOUNTER
Refill Decision Note   Dev Sweeney  is requesting a refill authorization.  Brief Assessment and Rationale for Refill:  Approve     Medication Therapy Plan:         Comments:     Note composed:5:23 AM 02/28/2024

## 2024-02-28 NOTE — TELEPHONE ENCOUNTER
No care due was identified.  [facility] Embedded Care Due Messages. Reference number: [id number].   2/28/2024 12:03:29 AM CST

## 2024-03-05 LAB
FUNGUS BLD CULT: ABNORMAL
FUNGUS BLD CULT: ABNORMAL

## 2024-04-10 ENCOUNTER — PATIENT MESSAGE (OUTPATIENT)
Dept: UROLOGY | Facility: CLINIC | Age: 49
End: 2024-04-10
Payer: COMMERCIAL

## 2024-04-22 ENCOUNTER — PATIENT MESSAGE (OUTPATIENT)
Dept: INTERNAL MEDICINE | Facility: CLINIC | Age: 49
End: 2024-04-22
Payer: COMMERCIAL

## 2024-04-22 DIAGNOSIS — I50.22 CHRONIC SYSTOLIC HEART FAILURE: ICD-10-CM

## 2024-04-23 ENCOUNTER — LAB VISIT (OUTPATIENT)
Dept: LAB | Facility: HOSPITAL | Age: 49
End: 2024-04-23
Attending: UROLOGY
Payer: COMMERCIAL

## 2024-04-23 DIAGNOSIS — E29.1 HYPOGONADISM MALE: ICD-10-CM

## 2024-04-23 LAB
BASOPHILS # BLD AUTO: 0.08 K/UL (ref 0–0.2)
BASOPHILS NFR BLD: 1 % (ref 0–1.9)
COMPLEXED PSA SERPL-MCNC: 0.33 NG/ML (ref 0–4)
DIFFERENTIAL METHOD BLD: ABNORMAL
EOSINOPHIL # BLD AUTO: 0.5 K/UL (ref 0–0.5)
EOSINOPHIL NFR BLD: 6.5 % (ref 0–8)
ERYTHROCYTE [DISTWIDTH] IN BLOOD BY AUTOMATED COUNT: 23.3 % (ref 11.5–14.5)
HCT VFR BLD AUTO: 46.3 % (ref 40–54)
HGB BLD-MCNC: 12.7 G/DL (ref 14–18)
IMM GRANULOCYTES # BLD AUTO: 0.02 K/UL (ref 0–0.04)
IMM GRANULOCYTES NFR BLD AUTO: 0.3 % (ref 0–0.5)
LYMPHOCYTES # BLD AUTO: 1.4 K/UL (ref 1–4.8)
LYMPHOCYTES NFR BLD: 17.6 % (ref 18–48)
MCH RBC QN AUTO: 20 PG (ref 27–31)
MCHC RBC AUTO-ENTMCNC: 27.4 G/DL (ref 32–36)
MCV RBC AUTO: 73 FL (ref 82–98)
MONOCYTES # BLD AUTO: 0.8 K/UL (ref 0.3–1)
MONOCYTES NFR BLD: 10.1 % (ref 4–15)
NEUTROPHILS # BLD AUTO: 5.2 K/UL (ref 1.8–7.7)
NEUTROPHILS NFR BLD: 64.5 % (ref 38–73)
NRBC BLD-RTO: 0 /100 WBC
PLATELET # BLD AUTO: 312 K/UL (ref 150–450)
PMV BLD AUTO: 10.4 FL (ref 9.2–12.9)
RBC # BLD AUTO: 6.34 M/UL (ref 4.6–6.2)
TESTOST SERPL-MCNC: 216 NG/DL (ref 304–1227)
WBC # BLD AUTO: 7.99 K/UL (ref 3.9–12.7)

## 2024-04-23 PROCEDURE — 85025 COMPLETE CBC W/AUTO DIFF WBC: CPT | Performed by: UROLOGY

## 2024-04-23 PROCEDURE — 36415 COLL VENOUS BLD VENIPUNCTURE: CPT | Mod: PO | Performed by: UROLOGY

## 2024-04-23 PROCEDURE — 84403 ASSAY OF TOTAL TESTOSTERONE: CPT | Performed by: UROLOGY

## 2024-04-23 PROCEDURE — 84153 ASSAY OF PSA TOTAL: CPT | Performed by: UROLOGY

## 2024-04-23 RX ORDER — SACUBITRIL AND VALSARTAN 24; 26 MG/1; MG/1
TABLET, FILM COATED ORAL
Qty: 60 TABLET | Refills: 3 | Status: SHIPPED | OUTPATIENT
Start: 2024-04-23

## 2024-04-25 DIAGNOSIS — I50.22 CHRONIC SYSTOLIC HEART FAILURE: ICD-10-CM

## 2024-04-25 DIAGNOSIS — I42.8 NONISCHEMIC CARDIOMYOPATHY: Primary | ICD-10-CM

## 2024-04-25 DIAGNOSIS — E78.2 MIXED HYPERLIPIDEMIA: ICD-10-CM

## 2024-04-26 RX ORDER — CARVEDILOL 25 MG/1
TABLET ORAL
Qty: 180 TABLET | Refills: 1 | Status: SHIPPED | OUTPATIENT
Start: 2024-04-26

## 2024-04-26 NOTE — TELEPHONE ENCOUNTER
No care due was identified.  [facility] Embedded Care Due Messages. Reference number: [id number].   4/26/2024 12:02:07 AM CDT

## 2024-04-26 NOTE — TELEPHONE ENCOUNTER
Refill Authorization Note     Refill Decision Note   Dev Seweney  is requesting a refill authorization.  Brief Assessment and Rationale for Refill:  Approve     Medication Therapy Plan:       Medication Reconciliation Completed: No   Comments:     No Care Gaps recommended.     Note composed:10:18 AM 04/26/2024

## 2024-04-29 ENCOUNTER — OFFICE VISIT (OUTPATIENT)
Dept: UROLOGY | Facility: CLINIC | Age: 49
End: 2024-04-29
Payer: COMMERCIAL

## 2024-04-29 DIAGNOSIS — E29.1 HYPOGONADISM MALE: Primary | ICD-10-CM

## 2024-04-29 PROCEDURE — 4010F ACE/ARB THERAPY RXD/TAKEN: CPT | Mod: CPTII,95,, | Performed by: UROLOGY

## 2024-04-29 PROCEDURE — 1159F MED LIST DOCD IN RCRD: CPT | Mod: CPTII,95,, | Performed by: UROLOGY

## 2024-04-29 PROCEDURE — 99213 OFFICE O/P EST LOW 20 MIN: CPT | Mod: 95,,, | Performed by: UROLOGY

## 2024-04-29 PROCEDURE — 1160F RVW MEDS BY RX/DR IN RCRD: CPT | Mod: CPTII,95,, | Performed by: UROLOGY

## 2024-04-29 RX ORDER — TESTOSTERONE CYPIONATE 200 MG/ML
300 INJECTION, SOLUTION INTRAMUSCULAR
Qty: 10 ML | Refills: 5 | Status: SHIPPED | OUTPATIENT
Start: 2024-04-29 | End: 2024-10-28

## 2024-04-29 NOTE — PROGRESS NOTES
Subjective:       Dev Sweeney is a 48 y.o. male who is an established patient who was referred by Dr. Loki Alba  for evaluation of low T.      Recent blood work with low testosterone. Also with significantly low T in chart from 2004. Reports decreased energy and low libido. Also with ED issues. Took Viagra in the past from online purchase that was helpful. Not working out as much as previously. Vasectomy in 2004.      PMH: CHF (EF 10% initially, recently reported to be 45%), HTN, smoker    10/21 T  - 102 (2004 - 106), PSA - 0.62   10/21 T - 2355, low FSH/LJ  11/21 T - 152, free 31.3  2/22 T - 111, PSA - 0.30 (6 days prior to next injection)  4/23 T - 1084, PSA - 0.6, Hgb - 11.7  4/24 T - 216, PSA - 0.33, Hgb - 12.7     Repeat T check shows a drastically elevated testosterone (2355). LH and FSH low. Prolactin normal. He  reports taking exogenous testosterone from someone at the gym. Two doses in one week. He may have had some increased energy and increased sex drive noted in the last week.     Virtual visit. Started on TRT injections - 400mg q4wks. He has noted improvement of symptoms though notes low T symptoms return at 2 weeks.     5/3/2023  Virtual visit. Doing well with TRT 300mg [id number]. Doing well with this regimen. Labs good.    4/29/2024  Virtual visit. Doing injections but doing 200mg q10d due to problems with getting the rx from the pharmacy. Feels he is not getting as good as treatment response as previously. Last T level lower (day 8 after injection).           The following portions of the patient's history were reviewed and updated as appropriate: allergies, current medications, past family history, past medical history, past social history, past surgical history and problem list.    Review of Systems  Twelve point review of systems completed. Pertinent positive and negatives listed in HPI.      Objective:    Vitals: There were no vitals taken for this visit.    Physical Exam - not  done today - virtual visit  General: well developed, well nourished in no acute distress  Head: normocephalic, atraumatic  Neck: supple, trachea midline, no obvious enlargement of thyroid  HEENT: EOMI, mucus membranes moist, sclera anicteric, no hearing impairment  Lungs: symmetric expansion, non-labored breathing  Skin: no rashes or lesions  Neuro: alert and oriented x 3, no gross deficits  Psych: normal judgment and insight, normal mood/affect and non-anxious  Genitourinary: (last visit)  Penis -  circumcised penis without plaques, lesions, or scarring.  Urethra - orthotopic location without stenosis.  Scrotum  - no lesions or rashes, no hydrocele or hernia.  Testes - descended bilaterally, symmetric without masses, non tender.  Epididymides - no cysts or lesions, no spermatocele, symmetric.   CORINNE: deferred      Lab Review   Urine analysis today in clinic shows no urine     Lab Results   Component Value Date    WBC 7.99 04/23/2024    HGB 12.7 (L) 04/23/2024    HCT 46.3 04/23/2024    MCV 73 (L) 04/23/2024     04/23/2024     Lab Results   Component Value Date    CREATININE 0.8 09/06/2023    BUN 8 09/06/2023     Lab Results   Component Value Date    PSA 0.55 09/06/2023     Lab Results   Component Value Date    PSADIAG 0.33 04/23/2024       Imaging  NA         Assessment/Plan:      1. Hypogonadism male    - Significantly low total T level   - Normal  exam   - Testosterone panel   - Prolactin, FSH, LH   - May need endocrine referral   - May need PDE5i for ED in addition to TRT     - T level now very elevated after taking exogenous testosterone. Cautioned on use.   - TRT started 11/21 - prefers self-injection.    - 400mg q28d to start. Wearing off too soon.    - Changed dosing to 300mg q2wks. Refilled today. May need to adjust again if not getting therapeutic.   - Recheck T level 6months.        - Discussed hypogonadism, common symptoms, treatment options, and the risks and benefits of treatment.  His symptoms  and blood tests support the diagnosis and therefore treatment is appropriate.   - Discussed the various risks associated with TRT, specifically a possible increase in risk of heart disease, MI, CVA, PE, DVT. Also discussed the issues related to testosterone replacement and prostate cancer. TRT does not increase his risk of developing cancer. Recommend annual CORINNE and PSA for PCa screening. TRT recommended to stop if abnormal CORINNE or elevated PSA.   - Treatment options reviewed: regular injections, topical treatments including gels and creams, and implants such as TestoPel.  Risks and benefits of each were reviewed specifically T fluctuations with q2-4week injections and risk of transfer of medication to other with topical application.         Follow up 6 months with labs      The patient location is: [place]  The chief complaint leading to consultation is: low T    Visit type: audiovisual    Face to Face time with patient: 9 min  15 minutes of total time spent on the encounter, which includes face to face time and non-face to face time preparing to see the patient (eg, review of tests), Obtaining and/or reviewing separately obtained history, Documenting clinical information in the electronic or other health record, Independently interpreting results (not separately reported) and communicating results to the patient/family/caregiver, or Care coordination (not separately reported).         Each patient to whom he or she provides medical services by telemedicine is:  (1) informed of the relationship between the physician and patient and the respective role of any other health care provider with respect to management of the patient; and (2) notified that he or she may decline to receive medical services by telemedicine and may withdraw from such care at any time.    Notes:

## 2024-05-26 DIAGNOSIS — G47.00 INSOMNIA, UNSPECIFIED TYPE: ICD-10-CM

## 2024-05-26 NOTE — TELEPHONE ENCOUNTER
No care due was identified.  [facility] Embedded Care Due Messages. Reference number: [id number].   5/26/2024 10:57:25 AM CDT

## 2024-05-27 RX ORDER — CLONAZEPAM 0.5 MG/1
0.5 TABLET ORAL NIGHTLY PRN
Qty: 30 TABLET | Refills: 3 | Status: SHIPPED | OUTPATIENT
Start: 2024-05-27 | End: 2025-05-27

## 2024-05-27 NOTE — TELEPHONE ENCOUNTER
Refill Routing Note   Medication(s) are not appropriate for processing by [facility] for the following reason(s):        Outside of protocol    [facility] action(s):  Route               Appointments  past 12m or future 3m with PCP    Date Provider   Last Visit   9/6/2023 Loki Alba, DO   Next Visit   Visit date not found Loki Alba, DO   ED visits in past 90 days: 0        Note composed:9:30 AM 05/27/2024

## 2024-05-29 DIAGNOSIS — G47.00 INSOMNIA, UNSPECIFIED TYPE: ICD-10-CM

## 2024-05-29 RX ORDER — CLONAZEPAM 0.5 MG/1
0.5 TABLET ORAL NIGHTLY PRN
Qty: 30 TABLET | Refills: 3 | Status: CANCELLED | OUTPATIENT
Start: 2024-05-29 | End: 2025-05-29

## 2024-05-29 NOTE — TELEPHONE ENCOUNTER
No care due was identified.  [facility] Embedded Care Due Messages. Reference number: [id number].   5/29/2024 4:42:24 PM CDT

## 2024-06-14 ENCOUNTER — OFFICE VISIT (OUTPATIENT)
Dept: DERMATOLOGY | Facility: CLINIC | Age: 49
End: 2024-06-14
Payer: COMMERCIAL

## 2024-06-14 DIAGNOSIS — B35.1 ONYCHOMYCOSIS: Primary | ICD-10-CM

## 2024-06-14 DIAGNOSIS — L72.0 EPIDERMAL INCLUSION CYST: ICD-10-CM

## 2024-06-14 DIAGNOSIS — B35.3 TINEA PEDIS, UNSPECIFIED LATERALITY: ICD-10-CM

## 2024-06-14 DIAGNOSIS — L82.1 SEBORRHEIC KERATOSES: ICD-10-CM

## 2024-06-14 PROCEDURE — 1160F RVW MEDS BY RX/DR IN RCRD: CPT | Mod: CPTII,S$GLB,, | Performed by: STUDENT IN AN ORGANIZED HEALTH CARE EDUCATION/TRAINING PROGRAM

## 2024-06-14 PROCEDURE — 99213 OFFICE O/P EST LOW 20 MIN: CPT | Mod: S$GLB,,, | Performed by: STUDENT IN AN ORGANIZED HEALTH CARE EDUCATION/TRAINING PROGRAM

## 2024-06-14 PROCEDURE — 99999 PR PBB SHADOW E&M-EST. PATIENT-LVL III: CPT | Mod: PBBFAC,,, | Performed by: STUDENT IN AN ORGANIZED HEALTH CARE EDUCATION/TRAINING PROGRAM

## 2024-06-14 PROCEDURE — 4010F ACE/ARB THERAPY RXD/TAKEN: CPT | Mod: CPTII,S$GLB,, | Performed by: STUDENT IN AN ORGANIZED HEALTH CARE EDUCATION/TRAINING PROGRAM

## 2024-06-14 PROCEDURE — 1159F MED LIST DOCD IN RCRD: CPT | Mod: CPTII,S$GLB,, | Performed by: STUDENT IN AN ORGANIZED HEALTH CARE EDUCATION/TRAINING PROGRAM

## 2024-06-14 NOTE — PROGRESS NOTES
Subjective:      Patient ID:  Dev Sweeney is a 48 y.o. male who presents for   Chief Complaint   Patient presents with    Cyst     R chest     History of Present Illness: The patient presents for follow up of tinea pedis.    The patient was last seen on: TBSE and treatment of tinea pedis - better.   Pt used ciclopirox cream BID x 1 month (improved). Uses prn flares (qweek).  Took lamisil 250 mg qday x 1 week (stopped due to dizziness - on heart medication).    Other skin complaints:   Patient with new are of concern:   Location: R chest  Previous treatments: manipulation    Pt also c/o multiple spots on bilateral thighs. No symptoms.  Only able to take terbinafine x 1 week due to interaction with coreg. Using ciclopirox which helps but when he stops it it recurs within 1 week    Review of Systems   Skin:  Positive for rash, abscesses (R chest) and wears hat. Negative for itching, daily sunscreen use, activity-related sunscreen use and recent sunburn.   Hematologic/Lymphatic: Does not bruise/bleed easily.       Objective:   Physical Exam   Constitutional: He appears well-developed and well-nourished. No distress.   Neurological: He is alert and oriented to person, place, and time. He is not disoriented.   Psychiatric: He has a normal mood and affect.   Skin:   Areas Examined (abnormalities noted in diagram):   Chest / Axilla Inspection Performed  RLE Inspected  LLE Inspection Performed            Diagram Legend     Erythematous scaling macule/papule c/w actinic keratosis       Vascular papule c/w angioma      Pigmented verrucoid papule/plaque c/w seborrheic keratosis      Yellow umbilicated papule c/w sebaceous hyperplasia      Irregularly shaped tan macule c/w lentigo     1-2 mm smooth white papules consistent with Milia      Movable subcutaneous cyst with punctum c/w epidermal inclusion cyst      Subcutaneous movable cyst c/w pilar cyst      Firm pink to brown papule c/w dermatofibroma      Pedunculated  fleshy papule(s) c/w skin tag(s)      Evenly pigmented macule c/w junctional nevus     Mildly variegated pigmented, slightly irregular-bordered macule c/w mildly atypical nevus      Flesh colored to evenly pigmented papule c/w intradermal nevus       Pink pearly papule/plaque c/w basal cell carcinoma      Erythematous hyperkeratotic cursted plaque c/w SCC      Surgical scar with no sign of skin cancer recurrence      Open and closed comedones      Inflammatory papules and pustules      Verrucoid papule consistent consistent with wart     Erythematous eczematous patches and plaques     Dystrophic onycholytic nail with subungual debris c/w onychomycosis     Umbilicated papule    Erythematous-base heme-crusted tan verrucoid plaque consistent with inflamed seborrheic keratosis     Erythematous Silvery Scaling Plaque c/w Psoriasis     See annotation      Assessment / Plan:        Onychomycosis  Tinea pedis, unspecified laterality  - improved but not resolved with topicals alone. He also has onychomycosis. Counseled that it would like required PO therapy to cure. He has issues with dizziness on terbinafine likely due to interaction with coreg. He will discuss lowering his dose while on terbinafine with his cardiologist next week. If cleared by cards and baseline cmp wnl next week, he gee end me a message and I will order repeat cmp for 1 month and 3 months of po terbinafine  - c/w ciclopirox cream daily for now, which controls it while using    Ddx for rash on dorsal foot includes eczema. He has h/o atopic derm, asthma. If not cleared after po terbinafine then will rx a topical steroid    Seborrheic keratoses  Reassurance given to patient. No treatment is necessary.   Treatment of benign, asymptomatic lesions may be considered cosmetic.    Epidermal inclusion cyst--right chest  Reassurance given to patient on benign nature.  Discussed treatment options - excision vs observation. If lesion is not treated, it may grow and  become intermittently inflamed. Discussed RBSE of surgery including scar, infection, bleeding and recurrence. Patient voiced understanding and would like to   defer treatment for now.    Will send a message if he would like to schedule cyst removal in the future             Follow up if symptoms worsen or fail to improve.

## 2024-06-24 ENCOUNTER — HOSPITAL ENCOUNTER (OUTPATIENT)
Dept: CARDIOLOGY | Facility: HOSPITAL | Age: 49
Discharge: HOME | End: 2024-06-24
Attending: INTERNAL MEDICINE
Payer: COMMERCIAL

## 2024-06-24 VITALS
HEART RATE: 79 BPM | BODY MASS INDEX: 33.18 KG/M2 | SYSTOLIC BLOOD PRESSURE: 140 MMHG | WEIGHT: 245 LBS | DIASTOLIC BLOOD PRESSURE: 80 MMHG | HEIGHT: 72 IN

## 2024-06-24 DIAGNOSIS — I42.8 NONISCHEMIC CARDIOMYOPATHY: ICD-10-CM

## 2024-06-24 DIAGNOSIS — I50.22 CHRONIC SYSTOLIC HEART FAILURE: ICD-10-CM

## 2024-06-24 LAB
ASCENDING AORTA: 3.8 CM
AV INDEX (PROSTH): 0.6
AV MEAN GRADIENT: 5 MMHG
AV PEAK GRADIENT: 7 MMHG
AV VALVE AREA BY VELOCITY RATIO: 2.03 CM²
AV VALVE AREA: 1.97 CM²
AV VELOCITY RATIO: 0.62
BSA FOR ECHO PROCEDURE: 2.38 M2
CV ECHO LV RWT: 0.48 CM
DOP CALC AO PEAK VEL: 1.35 M/S
DOP CALC AO VTI: 27.12 CM
DOP CALC LVOT AREA: 3.3 CM2
DOP CALC LVOT DIAMETER: 2.04 CM
DOP CALC LVOT PEAK VEL: 0.84 M/S
DOP CALC LVOT STROKE VOLUME: 53.41 CM3
DOP CALC RVOT PEAK VEL: 0.51 M/S
DOP CALC RVOT VTI: 8.94 CM
DOP CALCLVOT PEAK VEL VTI: 16.35 CM
E WAVE DECELERATION TIME: 103.02 MSEC
E/A RATIO: 1.02
E/E' RATIO: 6.93 M/S
ECHO LV POSTERIOR WALL: 1.05 CM (ref 0.6–1.1)
FRACTIONAL SHORTENING: 23 % (ref 28–44)
GLOBAL LONGITUIDAL STRAIN: 13 %
INTERVENTRICULAR SEPTUM: 0.96 CM (ref 0.6–1.1)
LA MAJOR: 5.77 CM
LA MINOR: 5.84 CM
LA WIDTH: 4.34 CM
LEFT ATRIUM SIZE: 3.22 CM
LEFT ATRIUM VOLUME INDEX MOD: 31.7 ML/M2
LEFT ATRIUM VOLUME INDEX: 29.7 ML/M2
LEFT ATRIUM VOLUME MOD: 73.6 CM3
LEFT ATRIUM VOLUME: 68.95 CM3
LEFT INTERNAL DIMENSION IN SYSTOLE: 3.34 CM (ref 2.1–4)
LEFT VENTRICLE DIASTOLIC VOLUME INDEX: 36.67 ML/M2
LEFT VENTRICLE DIASTOLIC VOLUME: 85.08 ML
LEFT VENTRICLE MASS INDEX: 63 G/M2
LEFT VENTRICLE SYSTOLIC VOLUME INDEX: 19.6 ML/M2
LEFT VENTRICLE SYSTOLIC VOLUME: 45.53 ML
LEFT VENTRICULAR INTERNAL DIMENSION IN DIASTOLE: 4.34 CM (ref 3.5–6)
LEFT VENTRICULAR MASS: 145.62 G
LV LATERAL E/E' RATIO: 5.2 M/S
LV SEPTAL E/E' RATIO: 10.4 M/S
MV A" WAVE DURATION": 7.61 MSEC
MV PEAK A VEL: 0.51 M/S
MV PEAK E VEL: 0.52 M/S
MV STENOSIS PRESSURE HALF TIME: 29.87 MS
MV VALVE AREA P 1/2 METHOD: 7.37 CM2
OHS LV EJECTION FRACTION SIMPSONS BIPLANE MOD: 48 %
PISA TR MAX VEL: 2.52 M/S
PULM VEIN S/D RATIO: 1.16
PV MEAN GRADIENT: 1 MMHG
PV PEAK D VEL: 0.32 M/S
PV PEAK GRADIENT: 3
PV PEAK S VEL: 0.37 M/S
PV PEAK VELOCITY: 0.92 M/S
RA MAJOR: 5.03 CM
RA PRESSURE ESTIMATED: 3 MMHG
RA WIDTH: 3.59 CM
RIGHT VENTRICLE DIASTOLIC BASEL DIMENSION: 3.4 CM
RV TB RVSP: 6 MMHG
SINUS: 3.98 CM
STJ: 3.47 CM
TDI LATERAL: 0.1 M/S
TDI SEPTAL: 0.05 M/S
TDI: 0.08 M/S
TR MAX PG: 25 MMHG
TRICUSPID ANNULAR PLANE SYSTOLIC EXCURSION: 2.53 CM
TV REST PULMONARY ARTERY PRESSURE: 28 MMHG
Z-SCORE OF LEFT VENTRICULAR DIMENSION IN END DIASTOLE: -7.58
Z-SCORE OF LEFT VENTRICULAR DIMENSION IN END SYSTOLE: -4.05

## 2024-06-24 PROCEDURE — 93306 TTE W/DOPPLER COMPLETE: CPT | Mod: PO

## 2024-06-24 PROCEDURE — 93356 MYOCRD STRAIN IMG SPCKL TRCK: CPT | Mod: PO

## 2024-06-24 PROCEDURE — 93356 MYOCRD STRAIN IMG SPCKL TRCK: CPT | Mod: ,,, | Performed by: INTERNAL MEDICINE

## 2024-06-24 PROCEDURE — 93306 TTE W/DOPPLER COMPLETE: CPT | Mod: 26,,, | Performed by: INTERNAL MEDICINE

## 2024-06-24 NOTE — PROGRESS NOTES
General Cardiology Clinic Note  Reason for Visit: Annual follow up   Last Clinic Visit: 11/29/22   General Cardiologist: Dr. Kemp     HPI:     Dev Sweeney is a 49 y.o. , who presents for follow up of NICM     PROBLEM LIST:  Nonischemic cardiomyopathy - due to ETOH  LVEF 10-15% in 8/2020   - improved to 50% by 4/2024  Hypercholesterolemia  Hypertension  Tobacco use - quit 2020    Interval HPI:   He has generally well.  He does not report chest discomfort or shortness of breath with exertion. He does not report orthopnea, PND or edema.  Echo 06/2024 LV not enlarged, LVEF 50%. No significant valvular regurgitation    On carvedilol 25 mg b.i.d. and low-dose Entresto. Also takes furosemide daily and has not required an extra dose.  He has a fungal infection in his foot and was recommended to take terfenadine but warned about its interaction with carvedilol.  Became lightheaded after taking terfenadine and therefore stopped it    He felt jittery w Adipex and stopped taking it.    Works offshore 2 weeks at a time. He states he he does not drink alcohol but had 2 drinks w a friend at lunch    Surgical: Reviewed, as below.  Family: Reviewed, as below. No premature CAD, HF, SCD.  Social: Reviewed, as below.    ROS:    Pertinent ROS included in HPI and below.  PMH:     Past Medical History:   Diagnosis Date    Allergy     Asthma     CHF (congestive heart failure)     GERD (gastroesophageal reflux disease)     Hiatal hernia     Hyperlipidemia     Hypertension     Insomnia      Past Surgical History:   Procedure Laterality Date    COLONOSCOPY N/A 8/18/2022    Procedure: COLONOSCOPY;  Surgeon: Saud Bui MD;  Location: [facility] ENDO;  Service: Endoscopy;  Laterality: N/A;    CORONARY ANGIOGRAPHY N/A 8/26/2020    Procedure: ANGIOGRAM, CORONARY ARTERY;  Surgeon: Alan Lao MD;  Location: [facility] CATH LAB;  Service: Cardiology;  Laterality: N/A;    ESOPHAGOGASTRODUODENOSCOPY N/A 8/18/2022     "Procedure: EGD (ESOPHAGOGASTRODUODENOSCOPY);  Surgeon: Saud Bui MD;  Location: [facility] ENDO;  Service: Endoscopy;  Laterality: N/A;    FRACTURE SURGERY      HERNIA REPAIR      LEFT HEART CATHETERIZATION Right 8/26/2020    Procedure: Left heart cath;  Surgeon: Alan Lao MD;  Location: [facility] CATH LAB;  Service: Cardiology;  Laterality: Right;    ORBITAL FRACTURE SURGERY      left    VASECTOMY       Allergies:     Review of patient's allergies indicates:   Allergen Reactions    Penicillins Anaphylaxis     Other reaction(s): Hives  Other reaction(s): Difficulty breathing     Medications:     Current Outpatient Medications on File Prior to Visit   Medication Sig    albuterol (PROVENTIL/VENTOLIN HFA) 90 mcg/actuation inhaler INHALE 1-2 PUFFS INTO THE LUNGS EVERY 4 (FOUR) HOURS AS NEEDED FOR WHEEZING. RESCUE    aspirin 81 MG Chew Take 1 tablet (81 mg total) by mouth once daily.    carvediloL (COREG) 25 MG tablet TAKE 1 TABLET BY MOUTH TWICE A DAY WITH FOOD    ciclopirox (LOPROX) 0.77 % Crea Apply topically 2 (two) times daily. Apply to feet    clonazePAM (KLONOPIN) 0.5 MG tablet TAKE 1 TABLET (0.5 MG TOTAL) BY MOUTH NIGHTLY AS NEEDED (INSOMNIA).    ENTRESTO 24-26 mg per tablet TAKE 1 TABLET BY MOUTH TWICE A DAY    furosemide (LASIX) 40 MG tablet TAKE 1 TABLET BY MOUTH EVERY DAY    HYDROcodone-acetaminophen (NORCO)  mg per tablet Take 1 tablet by mouth daily as needed.    magnesium oxide (MAG-OX) 400 mg (241.3 mg magnesium) tablet Take 1 tablet by mouth once daily.    omeprazole (PRILOSEC) 40 MG capsule TAKE 1 CAPSULE BY MOUTH EVERY DAY IN THE MORNING    rosuvastatin (CRESTOR) 10 MG tablet TAKE 1 TABLET BY MOUTH EVERY DAY    syringe with needle (SYRINGE 3CC/22GX1") 3 mL 22 gauge x 1" Syrg 1 Units by Misc.(Non-Drug; Combo Route) route every 28 days.    testosterone cypionate (DEPOTESTOTERONE CYPIONATE) 200 mg/mL injection Inject 1.5 mLs (300 mg total) into the muscle every 14 (fourteen) days. "     No current facility-administered medications on file prior to visit.     Social History:     Social History     Tobacco Use    Smoking status: Former     Current packs/day: 1.00     Average packs/day: 1 pack/day for 15.0 years (15.0 ttl pk-yrs)     Types: Cigarettes    Smokeless tobacco: Never    Tobacco comments:     trying to quit   Substance Use Topics    Alcohol use: Yes     Alcohol/week: 1.0 standard drink of alcohol     Types: 1 Shots of liquor per week     Comment: socially     Family History:     Family History   Problem Relation Name Age of Onset    Heart disease Mother      Cancer Paternal Grandmother      Diabetes Paternal Grandmother      Cancer Paternal Grandfather      Colon polyps Neg Hx       Physical Exam:   /80   Pulse 88   Ht 6' (1.829 m)   Wt 111 kg (244 lb 11.4 oz)   BMI 33.19 kg/m²      Physical Exam  Constitutional:       Appearance: He is well-developed.      Comments:      HENT:      Head: Normocephalic.   Neck:      Vascular: No JVD.   Cardiovascular:      Rate and Rhythm: Normal rate and regular rhythm.      Heart sounds: S1 normal and S2 normal. No murmur heard.  Pulmonary:      Breath sounds: Normal breath sounds.   Chest:      Chest wall: There is no dullness to percussion.   Abdominal:      Palpations: Abdomen is soft. There is no hepatomegaly, splenomegaly or mass.   Musculoskeletal:      Right lower leg: No edema.      Left lower leg: No edema.   Skin:     Findings: No bruising.      Nails: There is no clubbing.   Neurological:      Mental Status: He is alert and oriented to person, place, and time.      Gait: Gait normal.   Psychiatric:         Speech: Speech normal.         Behavior: Behavior normal.          Labs:     Blood Tests:      Lab Results   Component Value Date    CHOL 119 (L) 06/24/2024    HDL 30 (L) 06/24/2024    TRIG 267 (H) 06/24/2024       Lab Results   Component Value Date    LDLCALC 35.6 (L) 06/24/2024       Lab Results   Component Value Date    TSH  2.075 09/06/2023       Lab Results   Component Value Date    HGBA1C 5.2 09/06/2023   CMP  Sodium   Date Value Ref Range Status   06/24/2024 141 136 - 145 mmol/L Final     Potassium   Date Value Ref Range Status   06/24/2024 3.8 3.5 - 5.1 mmol/L Final     Chloride   Date Value Ref Range Status   06/24/2024 101 95 - 110 mmol/L Final     CO2   Date Value Ref Range Status   06/24/2024 27 23 - 29 mmol/L Final     Glucose   Date Value Ref Range Status   06/24/2024 109 70 - 110 mg/dL Final     BUN   Date Value Ref Range Status   06/24/2024 6 6 - 20 mg/dL Final     Creatinine   Date Value Ref Range Status   06/24/2024 1.1 0.5 - 1.4 mg/dL Final     Calcium   Date Value Ref Range Status   06/24/2024 9.3 8.7 - 10.5 mg/dL Final     Total Protein   Date Value Ref Range Status   06/24/2024 6.9 6.0 - 8.4 g/dL Final     Albumin   Date Value Ref Range Status   06/24/2024 4.1 3.5 - 5.2 g/dL Final   11/18/2021 4.7 3.6 - 5.1 g/dL Final     Total Bilirubin   Date Value Ref Range Status   06/24/2024 0.4 0.1 - 1.0 mg/dL Final     Comment:     Alkaline Phosphatase   Date Value Ref Range Status   06/24/2024 58 55 - 135 U/L Final     AST   Date Value Ref Range Status   06/24/2024 30 10 - 40 U/L Final     ALT   Date Value Ref Range Status   06/24/2024 23 10 - 44 U/L Final     Anion Gap   Date Value Ref Range Status   06/24/2024 13 8 - 16 mmol/L Final     eGFR   Date Value Ref Range Status   06/24/2024 >60.0 >60 mL/min/1.73 m^2 Final        Imaging:     Echocardiogram  TTE 06/24/24    Left Ventricle: The left ventricle is normal in size. Ventricular mass is normal. Normal wall thickness. There is concentric remodeling. There is low normal systolic function with a visually estimated ejection fraction of 50%. Biplane (2D) method of discs ejection fraction is 48%. Global longitudinal strain is -13.0%. Global longitudinal strain is reduced. There is normal diastolic function.    Right Ventricle: Normal right ventricular cavity size. Systolic  function is normal.    Aortic Valve: There is mild aortic valve sclerosis. There is mild aortic regurgitation.    Mitral Valve: There is mild to moderate regurgitation.    Pulmonary Artery: The estimated pulmonary artery systolic pressure is 28 mmHg.    IVC/SVC: Normal venous pressure at 3 mmHg.    TTE 08/25/20  Eccentric left ventricular hypertrophy.  Severely decreased left ventricular systolic function. The estimated ejection fraction is 10%.  Severe left ventricular enlargement.  Grade III (severe) left ventricular diastolic dysfunction consistent with restrictive physiology.  No wall motion abnormalities.  Moderately to severely reduced right ventricular systolic function.  Moderate right ventricular enlargement.  Mild mitral regurgitation.  Mild tricuspid regurgitation.  Elevated central venous pressure (15 mmHg).    Stress testing  None    Cath Lab  [facility] 08/26/20  LVEDP (Pre): 34  LVEF=15%  Normal coronary arteries  Non-ischemic cardiomyopathy  Estimated blood loss: <50 mL      EKG:  Sinus rhythm without ST-T abnormality        Assessment:     1. HFimpEF (heart failure with improved ejection fraction)    2. Nonischemic cardiomyopathy    3. Hypercholesteremia    4. Primary hypertension    5. Alcohol abuse, in remission        Plan:     Nonischemic cardiomyopathy  TTE on 06/24 with EF 50%  Continue Entresto 24-26 mg BID   Decrease carvedilol to half a tablet q.a.m. and continue the full tablet at night for 3 months while he takes terfenadine.  Continue furosemide 40 mg daily   Avoid alcohol consumption     Chronic combined systolic and diastolic heart failure  Euvolemic during examination today, improved EF 10% > 50%  Continue medications as above   Limit sodium intake     Hypercholesteremia  LDL 35.6, at goal   Continue Crestor 10 mg daily     Alcohol abuse in remission.  Patient had 2 drinks yesterday.    Follow up 1 year      Signed:  Gita Lezama PA-C  [facility]     6/25/2024 1:47 PM    Follow-up:      Future Appointments   Date Time Provider Department Center   10/29/2024  9:40 AM Eunice Gallegos MD [facility]

## 2024-06-25 ENCOUNTER — OFFICE VISIT (OUTPATIENT)
Dept: CARDIOLOGY | Facility: CLINIC | Age: 49
End: 2024-06-25
Payer: COMMERCIAL

## 2024-06-25 VITALS
HEIGHT: 72 IN | DIASTOLIC BLOOD PRESSURE: 80 MMHG | WEIGHT: 244.69 LBS | HEART RATE: 88 BPM | BODY MASS INDEX: 33.14 KG/M2 | SYSTOLIC BLOOD PRESSURE: 124 MMHG

## 2024-06-25 DIAGNOSIS — E78.00 HYPERCHOLESTEREMIA: ICD-10-CM

## 2024-06-25 DIAGNOSIS — F10.11 ALCOHOL ABUSE, IN REMISSION: ICD-10-CM

## 2024-06-25 DIAGNOSIS — I42.8 NONISCHEMIC CARDIOMYOPATHY: ICD-10-CM

## 2024-06-25 DIAGNOSIS — I10 PRIMARY HYPERTENSION: ICD-10-CM

## 2024-06-25 DIAGNOSIS — I50.20 HFREF (HEART FAILURE WITH REDUCED EJECTION FRACTION): Primary | ICD-10-CM

## 2024-06-25 LAB
OHS QRS DURATION: 102 MS
OHS QTC CALCULATION: 441 MS

## 2024-06-25 PROCEDURE — 4010F ACE/ARB THERAPY RXD/TAKEN: CPT | Mod: CPTII,S$GLB,, | Performed by: INTERNAL MEDICINE

## 2024-06-25 PROCEDURE — 99214 OFFICE O/P EST MOD 30 MIN: CPT | Mod: S$GLB,,, | Performed by: INTERNAL MEDICINE

## 2024-06-25 PROCEDURE — 3074F SYST BP LT 130 MM HG: CPT | Mod: CPTII,S$GLB,, | Performed by: INTERNAL MEDICINE

## 2024-06-25 PROCEDURE — 1159F MED LIST DOCD IN RCRD: CPT | Mod: CPTII,S$GLB,, | Performed by: INTERNAL MEDICINE

## 2024-06-25 PROCEDURE — 1160F RVW MEDS BY RX/DR IN RCRD: CPT | Mod: CPTII,S$GLB,, | Performed by: INTERNAL MEDICINE

## 2024-06-25 PROCEDURE — 93005 ELECTROCARDIOGRAM TRACING: CPT | Mod: S$GLB,,, | Performed by: INTERNAL MEDICINE

## 2024-06-25 PROCEDURE — 3008F BODY MASS INDEX DOCD: CPT | Mod: CPTII,S$GLB,, | Performed by: INTERNAL MEDICINE

## 2024-06-25 PROCEDURE — 3079F DIAST BP 80-89 MM HG: CPT | Mod: CPTII,S$GLB,, | Performed by: INTERNAL MEDICINE

## 2024-06-25 PROCEDURE — 99999 PR PBB SHADOW E&M-EST. PATIENT-LVL IV: CPT | Mod: PBBFAC,,, | Performed by: INTERNAL MEDICINE

## 2024-06-25 PROCEDURE — 93010 ELECTROCARDIOGRAM REPORT: CPT | Mod: S$GLB,,, | Performed by: INTERNAL MEDICINE

## 2024-06-25 NOTE — PATIENT INSTRUCTIONS
Reduce the morning dose of carvedilol when you take terfenadine.  Continue the same dose at night.  If he finds that she getting lightheaded you can reduce the nighttime dose to half a tablet as well.  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    Continue to avoid salt.  Take an extra tablet of Lasix a few retaining fluid.  You should be able to drink an extra 8-10 oz of water a day.    Avoid alcohol even a few drinks. This was the culprit of your weak heart.  Next time he might not be that mike..    Also please make a follow up appointment w your primary physician Dr. Alba in the next 3-4 mo

## 2024-07-02 ENCOUNTER — PATIENT MESSAGE (OUTPATIENT)
Dept: INTERNAL MEDICINE | Facility: CLINIC | Age: 49
End: 2024-07-02
Payer: COMMERCIAL

## 2024-07-05 ENCOUNTER — PATIENT MESSAGE (OUTPATIENT)
Dept: DERMATOLOGY | Facility: CLINIC | Age: 49
End: 2024-07-05
Payer: COMMERCIAL

## 2024-07-05 DIAGNOSIS — B35.1 ONYCHOMYCOSIS: Primary | ICD-10-CM

## 2024-07-16 DIAGNOSIS — J45.909 ASTHMA, UNSPECIFIED ASTHMA SEVERITY, UNSPECIFIED WHETHER COMPLICATED, UNSPECIFIED WHETHER PERSISTENT: ICD-10-CM

## 2024-07-16 RX ORDER — ALBUTEROL SULFATE 90 UG/1
1-2 AEROSOL, METERED RESPIRATORY (INHALATION) EVERY 4 HOURS PRN
Qty: 25.5 G | Refills: 0 | Status: SHIPPED | OUTPATIENT
Start: 2024-07-16

## 2024-07-16 NOTE — TELEPHONE ENCOUNTER
Refill Decision Note   Dev Sweeney  is requesting a refill authorization.  Brief Assessment and Rationale for Refill:  Approve     Medication Therapy Plan:         Comments:     Note composed:8:23 AM 07/16/2024

## 2024-07-16 NOTE — TELEPHONE ENCOUNTER
No care due was identified.  Health [facility] Embedded Care Due Messages. Reference number: [id number].   7/16/2024 5:53:54 AM CDT

## 2024-07-19 ENCOUNTER — LAB VISIT (OUTPATIENT)
Dept: LAB | Facility: HOSPITAL | Age: 49
End: 2024-07-19
Attending: INTERNAL MEDICINE
Payer: COMMERCIAL

## 2024-07-19 ENCOUNTER — OFFICE VISIT (OUTPATIENT)
Dept: INTERNAL MEDICINE | Facility: CLINIC | Age: 49
End: 2024-07-19
Payer: COMMERCIAL

## 2024-07-19 VITALS
HEIGHT: 72 IN | DIASTOLIC BLOOD PRESSURE: 72 MMHG | TEMPERATURE: 98 F | HEART RATE: 82 BPM | SYSTOLIC BLOOD PRESSURE: 110 MMHG | BODY MASS INDEX: 33.1 KG/M2 | RESPIRATION RATE: 17 BRPM | OXYGEN SATURATION: 97 % | WEIGHT: 244.38 LBS

## 2024-07-19 DIAGNOSIS — K21.9 GASTROESOPHAGEAL REFLUX DISEASE, UNSPECIFIED WHETHER ESOPHAGITIS PRESENT: ICD-10-CM

## 2024-07-19 DIAGNOSIS — D50.9 MICROCYTIC ANEMIA: ICD-10-CM

## 2024-07-19 DIAGNOSIS — I42.8 NONISCHEMIC CARDIOMYOPATHY: Primary | ICD-10-CM

## 2024-07-19 DIAGNOSIS — I50.42 CHRONIC COMBINED SYSTOLIC AND DIASTOLIC HEART FAILURE: ICD-10-CM

## 2024-07-19 DIAGNOSIS — E29.1 HYPOGONADISM IN MALE: ICD-10-CM

## 2024-07-19 DIAGNOSIS — F10.10 ALCOHOL CONSUMPTION BINGE DRINKING: ICD-10-CM

## 2024-07-19 DIAGNOSIS — K44.9 HIATAL HERNIA: ICD-10-CM

## 2024-07-19 DIAGNOSIS — G47.00 INSOMNIA, UNSPECIFIED TYPE: ICD-10-CM

## 2024-07-19 DIAGNOSIS — F17.200 TOBACCO DEPENDENCE: ICD-10-CM

## 2024-07-19 LAB
BASOPHILS # BLD AUTO: 0.08 K/UL (ref 0–0.2)
BASOPHILS NFR BLD: 1.1 % (ref 0–1.9)
DIFFERENTIAL METHOD BLD: ABNORMAL
EOSINOPHIL # BLD AUTO: 0.2 K/UL (ref 0–0.5)
EOSINOPHIL NFR BLD: 3.4 % (ref 0–8)
ERYTHROCYTE [DISTWIDTH] IN BLOOD BY AUTOMATED COUNT: 20.5 % (ref 11.5–14.5)
FERRITIN SERPL-MCNC: 6 NG/ML (ref 20–300)
HCT VFR BLD AUTO: 43.3 % (ref 40–54)
HGB BLD-MCNC: 12.5 G/DL (ref 14–18)
IMM GRANULOCYTES # BLD AUTO: 0.02 K/UL (ref 0–0.04)
IMM GRANULOCYTES NFR BLD AUTO: 0.3 % (ref 0–0.5)
IRON SERPL-MCNC: 19 UG/DL (ref 45–160)
LYMPHOCYTES # BLD AUTO: 1.9 K/UL (ref 1–4.8)
LYMPHOCYTES NFR BLD: 27.7 % (ref 18–48)
MCH RBC QN AUTO: 21 PG (ref 27–31)
MCHC RBC AUTO-ENTMCNC: 28.9 G/DL (ref 32–36)
MCV RBC AUTO: 73 FL (ref 82–98)
MONOCYTES # BLD AUTO: 0.7 K/UL (ref 0.3–1)
MONOCYTES NFR BLD: 10.3 % (ref 4–15)
NEUTROPHILS # BLD AUTO: 4 K/UL (ref 1.8–7.7)
NEUTROPHILS NFR BLD: 57.2 % (ref 38–73)
NRBC BLD-RTO: 0 /100 WBC
PLATELET # BLD AUTO: 348 K/UL (ref 150–450)
PMV BLD AUTO: 10.2 FL (ref 9.2–12.9)
RBC # BLD AUTO: 5.94 M/UL (ref 4.6–6.2)
SATURATED IRON: 3 % (ref 20–50)
TOTAL IRON BINDING CAPACITY: 559 UG/DL (ref 250–450)
TRANSFERRIN SERPL-MCNC: 378 MG/DL (ref 200–375)
WBC # BLD AUTO: 6.98 K/UL (ref 3.9–12.7)

## 2024-07-19 PROCEDURE — 84466 ASSAY OF TRANSFERRIN: CPT | Performed by: INTERNAL MEDICINE

## 2024-07-19 PROCEDURE — 82728 ASSAY OF FERRITIN: CPT | Performed by: INTERNAL MEDICINE

## 2024-07-19 PROCEDURE — 99214 OFFICE O/P EST MOD 30 MIN: CPT | Mod: S$GLB,,, | Performed by: INTERNAL MEDICINE

## 2024-07-19 PROCEDURE — 85025 COMPLETE CBC W/AUTO DIFF WBC: CPT | Performed by: INTERNAL MEDICINE

## 2024-07-19 PROCEDURE — G2211 COMPLEX E/M VISIT ADD ON: HCPCS | Mod: S$GLB,,, | Performed by: INTERNAL MEDICINE

## 2024-07-19 PROCEDURE — 80321 ALCOHOLS BIOMARKERS 1OR 2: CPT | Performed by: INTERNAL MEDICINE

## 2024-07-19 PROCEDURE — 1159F MED LIST DOCD IN RCRD: CPT | Mod: CPTII,S$GLB,, | Performed by: INTERNAL MEDICINE

## 2024-07-19 PROCEDURE — 3008F BODY MASS INDEX DOCD: CPT | Mod: CPTII,S$GLB,, | Performed by: INTERNAL MEDICINE

## 2024-07-19 PROCEDURE — 4010F ACE/ARB THERAPY RXD/TAKEN: CPT | Mod: CPTII,S$GLB,, | Performed by: INTERNAL MEDICINE

## 2024-07-19 PROCEDURE — 3078F DIAST BP <80 MM HG: CPT | Mod: CPTII,S$GLB,, | Performed by: INTERNAL MEDICINE

## 2024-07-19 PROCEDURE — 99999 PR PBB SHADOW E&M-EST. PATIENT-LVL IV: CPT | Mod: PBBFAC,,, | Performed by: INTERNAL MEDICINE

## 2024-07-19 PROCEDURE — 3074F SYST BP LT 130 MM HG: CPT | Mod: CPTII,S$GLB,, | Performed by: INTERNAL MEDICINE

## 2024-07-19 PROCEDURE — 83540 ASSAY OF IRON: CPT | Performed by: INTERNAL MEDICINE

## 2024-07-19 RX ORDER — VARENICLINE TARTRATE 1 MG/1
1 TABLET, FILM COATED ORAL 2 TIMES DAILY
Qty: 60 TABLET | Refills: 3 | Status: SHIPPED | OUTPATIENT
Start: 2024-07-19

## 2024-07-19 RX ORDER — VARENICLINE TARTRATE 0.5 (11)-1
KIT ORAL
Qty: 1 EACH | Refills: 0 | Status: SHIPPED | OUTPATIENT
Start: 2024-07-19

## 2024-07-19 RX ORDER — DISULFIRAM 250 MG/1
250 TABLET ORAL DAILY
Qty: 30 TABLET | Refills: 3 | Status: SHIPPED | OUTPATIENT
Start: 2024-07-19

## 2024-07-19 NOTE — PROGRESS NOTES
Subjective     Patient ID: Dev Sweeney is a 49 y.o. male.    Chief Complaint: Follow-up and Hypertension (Discuss weight loss and alcohol cessation)    HPI  Pt with CHF/Nonischemic cardiomyopathy 2/2 ETOH use, Binge drinking, HTN, Tobacco use, Hypogonadism, Insomnia, GERD/Hiatal hernia is here for f/u. Pt is ready to quit smoking again and would also like to try medication for tx of binge drinking. Hs is currently in therapy.   Review of Systems   Constitutional:  Negative for activity change, appetite change, chills, diaphoresis, fatigue, fever and unexpected weight change.   HENT:  Negative for postnasal drip, rhinorrhea, sinus pressure/congestion, sneezing, sore throat, trouble swallowing and voice change.    Respiratory:  Negative for cough, shortness of breath and wheezing.    Cardiovascular:  Negative for chest pain, palpitations and leg swelling.   Gastrointestinal:  Negative for abdominal pain, blood in stool, constipation, diarrhea, nausea and vomiting.   Genitourinary:  Negative for dysuria.   Musculoskeletal:  Negative for arthralgias and myalgias.   Integumentary:  Negative for rash and wound.   Allergic/Immunologic: Negative for environmental allergies and food allergies.   Hematological:  Negative for adenopathy. Does not bruise/bleed easily.          Objective     Physical Exam  Constitutional:       General: He is not in acute distress.     Appearance: Normal appearance. He is well-developed. He is not diaphoretic.   HENT:      Head: Normocephalic and atraumatic.      Right Ear: External ear normal.      Left Ear: External ear normal.      Nose: Nose normal.      Mouth/Throat:      Pharynx: No oropharyngeal exudate.   Eyes:      General: No scleral icterus.        Right eye: No discharge.         Left eye: No discharge.      Conjunctiva/sclera: Conjunctivae normal.      Pupils: Pupils are equal, round, and reactive to light.   Neck:      Vascular: No JVD.   Cardiovascular:      Rate and  Rhythm: Normal rate and regular rhythm.      Pulses: Normal pulses.      Heart sounds: Normal heart sounds. No murmur heard.  Pulmonary:      Effort: Pulmonary effort is normal. No respiratory distress.      Breath sounds: Normal breath sounds. No wheezing or rales.   Abdominal:      General: Bowel sounds are normal.      Tenderness: There is no abdominal tenderness. There is no guarding or rebound.   Musculoskeletal:      Cervical back: Normal range of motion and neck supple.      Right lower leg: No edema.      Left lower leg: No edema.   Lymphadenopathy:      Cervical: No cervical adenopathy.   Skin:     General: Skin is warm and dry.      Capillary Refill: Capillary refill takes less than 2 seconds.      Coloration: Skin is not pale.      Findings: No rash.   Neurological:      Mental Status: He is alert and oriented to person, place, and time. Mental status is at baseline.      Cranial Nerves: No cranial nerve deficit.   Psychiatric:         Mood and Affect: Mood normal.         Behavior: Behavior normal.            Assessment and Plan     1. Nonischemic cardiomyopathy    2. Chronic combined systolic and diastolic heart failure    3. Gastroesophageal reflux disease, unspecified whether esophagitis present    4. Hiatal hernia    5. Tobacco dependence  -     varenicline (CHANTIX STARTING MONTH BOX) 0.5 mg (11)- 1 mg (42) tablet; Take one 0.5mg tab by mouth once daily X3 days,then increase to one 0.5mg tab twice daily X4 days,then increase to one 1mg tab twice daily  Dispense: 1 each; Refill: 0  -     varenicline (CHANTIX) 1 mg Tab; Take 1 tablet (1 mg total) by mouth 2 (two) times daily.  Dispense: 60 tablet; Refill: 3    6. Insomnia, unspecified type    7. Alcohol consumption binge drinking  -     disulfiram (ANTABUSE) 250 mg tablet; Take 1 tablet (250 mg total) by mouth once daily.  Dispense: 30 tablet; Refill: 3  -     Phosphatidylethanol (PETH); Future; Expected date: 07/19/2024    8. Microcytic anemia  -      CBC Auto Differential; Future; Expected date: 07/19/2024  -     Iron and TIBC; Future; Expected date: 07/19/2024  -     Ferritin; Future; Expected date: 07/19/2024    9. Hypogonadism in male             HTN- stable on current meds      Insomnia- stable on Klonopin 0.5 mg qHS PRN, d/c Elavil      CHF/nonischemic cardiomyopathy 2/2 ETOH use- no s/s of volume overload on exam    -followed by cardio      Binge drinking- trial of Antabuse      GERD/Hiatal hernia- stable on PPI     Tobacco use- Rx Chantix(pt denies any SI/HI, MDD)     Hypogonadism- stable on TRT     Microcytic anemia- check iron studies      F/u in 3 months for annual exam

## 2024-07-20 RX ORDER — OMEPRAZOLE 40 MG/1
CAPSULE, DELAYED RELEASE ORAL
Qty: 90 CAPSULE | Refills: 3 | Status: SHIPPED | OUTPATIENT
Start: 2024-07-20

## 2024-07-20 NOTE — TELEPHONE ENCOUNTER
Refill Decision Note   Dev Sweeney  is requesting a refill authorization.  Brief Assessment and Rationale for Refill:  Approve     Medication Therapy Plan:       Medication Reconciliation Completed: No   Comments:     No Care Gaps recommended.     Note composed:12:24 PM 07/20/2024

## 2024-07-20 NOTE — TELEPHONE ENCOUNTER
No care due was identified.  [facility] Embedded Care Due Messages. Reference number: [id number].   7/20/2024 7:38:12 AM CDT

## 2024-07-22 RX ORDER — TERBINAFINE HYDROCHLORIDE 250 MG/1
250 TABLET ORAL DAILY
Qty: 84 TABLET | Refills: 0 | Status: SHIPPED | OUTPATIENT
Start: 2024-07-22 | End: 2024-10-14

## 2024-07-23 LAB
CLINICAL BIOCHEMIST REVIEW: NORMAL
PLPETH BLD-MCNC: 266 NG/ML
POPETH BLD-MCNC: 414 NG/ML

## 2024-07-29 ENCOUNTER — TELEPHONE (OUTPATIENT)
Dept: INTERNAL MEDICINE | Facility: CLINIC | Age: 49
End: 2024-07-29
Payer: COMMERCIAL

## 2024-07-29 DIAGNOSIS — D50.9 IRON DEFICIENCY ANEMIA, UNSPECIFIED IRON DEFICIENCY ANEMIA TYPE: Primary | ICD-10-CM

## 2024-07-29 DIAGNOSIS — Z78.9 DRINKING BINGE: ICD-10-CM

## 2024-07-29 RX ORDER — FERROUS SULFATE 325(65) MG
325 TABLET, DELAYED RELEASE (ENTERIC COATED) ORAL EVERY MORNING
Qty: 90 TABLET | Refills: 3 | Status: SHIPPED | OUTPATIENT
Start: 2024-07-29

## 2024-07-29 RX ORDER — ASCORBIC ACID 500 MG
500 TABLET ORAL EVERY MORNING
Qty: 90 TABLET | Refills: 3 | Status: SHIPPED | OUTPATIENT
Start: 2024-07-29

## 2024-08-14 DIAGNOSIS — F17.200 TOBACCO DEPENDENCE: ICD-10-CM

## 2024-08-14 NOTE — TELEPHONE ENCOUNTER
No care due was identified.  [facility] Embedded Care Due Messages. Reference number: [id number].   8/14/2024 4:15:32 PM CDT

## 2024-08-15 RX ORDER — VARENICLINE TARTRATE 0.5 (11)-1
KIT ORAL
Qty: 53 EACH | Refills: 0 | OUTPATIENT
Start: 2024-08-15

## 2024-08-15 RX ORDER — VARENICLINE TARTRATE 1 MG/1
1 TABLET, FILM COATED ORAL 2 TIMES DAILY
Qty: 60 TABLET | Refills: 3 | Status: SHIPPED | OUTPATIENT
Start: 2024-08-15

## 2024-08-31 DIAGNOSIS — I50.22 CHRONIC SYSTOLIC HEART FAILURE: ICD-10-CM

## 2024-09-03 RX ORDER — SACUBITRIL AND VALSARTAN 24; 26 MG/1; MG/1
TABLET, FILM COATED ORAL
Qty: 60 TABLET | Refills: 11 | Status: SHIPPED | OUTPATIENT
Start: 2024-09-03

## 2024-10-01 ENCOUNTER — PATIENT MESSAGE (OUTPATIENT)
Dept: INTERNAL MEDICINE | Facility: CLINIC | Age: 49
End: 2024-10-01
Payer: COMMERCIAL

## 2024-10-02 DIAGNOSIS — I42.8 NONISCHEMIC CARDIOMYOPATHY: ICD-10-CM

## 2024-10-02 DIAGNOSIS — J45.909 ASTHMA, UNSPECIFIED ASTHMA SEVERITY, UNSPECIFIED WHETHER COMPLICATED, UNSPECIFIED WHETHER PERSISTENT: ICD-10-CM

## 2024-10-02 NOTE — TELEPHONE ENCOUNTER
Care Due:                  Date            Visit Type   Department     Provider  --------------------------------------------------------------------------------                                EP -                              PRIMARY      MET INTERNAL  Last Visit: 07-      CARE ([facility])   MEDICINE       Loki Alba                              EP -                              PRIMARY      [facility] INTERNAL  Next Visit: 10-      CARE ([facility])   MEDICINE       Loki Alba                                                            Last  Test          Frequency    Reason                     Performed    Due Date  --------------------------------------------------------------------------------    CMP.........  6 months...  disulfiram...............  06- 12-    Health [facility] Embedded Care Due Messages. Reference number: [id number].   10/02/2024 1:47:05 PM CDT

## 2024-10-02 NOTE — TELEPHONE ENCOUNTER
No care due was identified.  Health [facility] Embedded Care Due Messages. Reference number: [id number].   10/02/2024 1:47:35 PM CDT

## 2024-10-03 RX ORDER — FUROSEMIDE 40 MG/1
40 TABLET ORAL
Qty: 90 TABLET | Refills: 3 | Status: SHIPPED | OUTPATIENT
Start: 2024-10-03

## 2024-10-03 RX ORDER — ROSUVASTATIN CALCIUM 10 MG/1
10 TABLET, COATED ORAL DAILY
Qty: 90 TABLET | Refills: 2 | Status: SHIPPED | OUTPATIENT
Start: 2024-10-03

## 2024-10-03 RX ORDER — ALBUTEROL SULFATE 90 UG/1
1-2 INHALANT RESPIRATORY (INHALATION) EVERY 4 HOURS PRN
Qty: 25.5 G | Refills: 3 | Status: SHIPPED | OUTPATIENT
Start: 2024-10-03

## 2024-10-03 NOTE — TELEPHONE ENCOUNTER
Refill Decision Note   Dev Sweeney  is requesting a refill authorization.  Brief Assessment and Rationale for Refill:  Approve     Medication Therapy Plan:         Comments:     Note composed:11:46 AM 10/03/2024

## 2024-10-03 NOTE — TELEPHONE ENCOUNTER
Provider Staff:  Action required for this patient    Requires labs      Please see care gap opportunities below in Care Due Message.    Thanks!  [facility]     Appointments      Date Provider   Last Visit   7/19/2024 Loki Alba, DO   Next Visit   10/2/2024 Loki Alba, DO     Refill Decision Note   Dev Sweeney  is requesting a refill authorization.  Brief Assessment and Rationale for Refill:  Approve     Medication Therapy Plan:  Labs (CMP) due      Comments:     Note composed:11:45 AM 10/03/2024

## 2024-10-08 DIAGNOSIS — G47.00 INSOMNIA, UNSPECIFIED TYPE: ICD-10-CM

## 2024-10-09 RX ORDER — CLONAZEPAM 0.5 MG/1
0.5 TABLET ORAL NIGHTLY PRN
Qty: 30 TABLET | Refills: 3 | Status: SHIPPED | OUTPATIENT
Start: 2024-10-09 | End: 2025-10-09

## 2024-10-09 NOTE — TELEPHONE ENCOUNTER
No care due was identified.  Health [facility] Embedded Care Due Messages. Reference number: [id number].   10/08/2024 9:04:42 PM CDT

## 2024-10-14 ENCOUNTER — LAB VISIT (OUTPATIENT)
Dept: LAB | Facility: HOSPITAL | Age: 49
End: 2024-10-14
Attending: INTERNAL MEDICINE
Payer: COMMERCIAL

## 2024-10-14 ENCOUNTER — OFFICE VISIT (OUTPATIENT)
Dept: INTERNAL MEDICINE | Facility: CLINIC | Age: 49
End: 2024-10-14
Payer: COMMERCIAL

## 2024-10-14 VITALS
OXYGEN SATURATION: 96 % | BODY MASS INDEX: 33.4 KG/M2 | TEMPERATURE: 98 F | DIASTOLIC BLOOD PRESSURE: 78 MMHG | SYSTOLIC BLOOD PRESSURE: 108 MMHG | RESPIRATION RATE: 16 BRPM | HEART RATE: 84 BPM | WEIGHT: 246.56 LBS | HEIGHT: 72 IN

## 2024-10-14 DIAGNOSIS — K44.9 HIATAL HERNIA: ICD-10-CM

## 2024-10-14 DIAGNOSIS — D50.9 IRON DEFICIENCY ANEMIA, UNSPECIFIED IRON DEFICIENCY ANEMIA TYPE: ICD-10-CM

## 2024-10-14 DIAGNOSIS — E78.00 HYPERCHOLESTEREMIA: ICD-10-CM

## 2024-10-14 DIAGNOSIS — E29.1 HYPOGONADISM MALE: ICD-10-CM

## 2024-10-14 DIAGNOSIS — Z78.9 DRINKING BINGE: ICD-10-CM

## 2024-10-14 DIAGNOSIS — I50.42 CHRONIC COMBINED SYSTOLIC AND DIASTOLIC HEART FAILURE: ICD-10-CM

## 2024-10-14 DIAGNOSIS — I42.8 NONISCHEMIC CARDIOMYOPATHY: ICD-10-CM

## 2024-10-14 DIAGNOSIS — Z00.00 ANNUAL PHYSICAL EXAM: Primary | ICD-10-CM

## 2024-10-14 DIAGNOSIS — G47.00 INSOMNIA, UNSPECIFIED TYPE: ICD-10-CM

## 2024-10-14 DIAGNOSIS — F17.200 TOBACCO DEPENDENCE: ICD-10-CM

## 2024-10-14 DIAGNOSIS — E29.1 HYPOGONADISM IN MALE: ICD-10-CM

## 2024-10-14 DIAGNOSIS — K21.9 GASTROESOPHAGEAL REFLUX DISEASE, UNSPECIFIED WHETHER ESOPHAGITIS PRESENT: ICD-10-CM

## 2024-10-14 DIAGNOSIS — E66.9 OBESITY (BMI 30-39.9): ICD-10-CM

## 2024-10-14 DIAGNOSIS — Z23 NEED FOR VACCINATION: ICD-10-CM

## 2024-10-14 DIAGNOSIS — I10 HYPERTENSION, UNSPECIFIED TYPE: ICD-10-CM

## 2024-10-14 LAB
BASOPHILS # BLD AUTO: 0.09 K/UL (ref 0–0.2)
BASOPHILS NFR BLD: 1.2 % (ref 0–1.9)
DIFFERENTIAL METHOD BLD: ABNORMAL
EOSINOPHIL # BLD AUTO: 0.2 K/UL (ref 0–0.5)
EOSINOPHIL NFR BLD: 2.9 % (ref 0–8)
ERYTHROCYTE [DISTWIDTH] IN BLOOD BY AUTOMATED COUNT: 24.2 % (ref 11.5–14.5)
FERRITIN SERPL-MCNC: 24 NG/ML (ref 20–300)
HCT VFR BLD AUTO: 52.4 % (ref 40–54)
HGB BLD-MCNC: 16.3 G/DL (ref 14–18)
IMM GRANULOCYTES # BLD AUTO: 0.02 K/UL (ref 0–0.04)
IMM GRANULOCYTES NFR BLD AUTO: 0.3 % (ref 0–0.5)
IRON SERPL-MCNC: 71 UG/DL (ref 45–160)
LYMPHOCYTES # BLD AUTO: 1.7 K/UL (ref 1–4.8)
LYMPHOCYTES NFR BLD: 23.4 % (ref 18–48)
MCH RBC QN AUTO: 24.6 PG (ref 27–31)
MCHC RBC AUTO-ENTMCNC: 31.1 G/DL (ref 32–36)
MCV RBC AUTO: 79 FL (ref 82–98)
MONOCYTES # BLD AUTO: 1 K/UL (ref 0.3–1)
MONOCYTES NFR BLD: 13.2 % (ref 4–15)
NEUTROPHILS # BLD AUTO: 4.3 K/UL (ref 1.8–7.7)
NEUTROPHILS NFR BLD: 59 % (ref 38–73)
NRBC BLD-RTO: 0 /100 WBC
PLATELET # BLD AUTO: 220 K/UL (ref 150–450)
PMV BLD AUTO: 10.1 FL (ref 9.2–12.9)
RBC # BLD AUTO: 6.63 M/UL (ref 4.6–6.2)
RETICS/RBC NFR AUTO: 1.4 % (ref 0.4–2)
SATURATED IRON: 16 % (ref 20–50)
TESTOST SERPL-MCNC: 571 NG/DL (ref 304–1227)
TOTAL IRON BINDING CAPACITY: 450 UG/DL (ref 250–450)
TRANSFERRIN SERPL-MCNC: 304 MG/DL (ref 200–375)
WBC # BLD AUTO: 7.35 K/UL (ref 3.9–12.7)

## 2024-10-14 PROCEDURE — 90656 IIV3 VACC NO PRSV 0.5 ML IM: CPT | Mod: S$GLB,,, | Performed by: INTERNAL MEDICINE

## 2024-10-14 PROCEDURE — 3008F BODY MASS INDEX DOCD: CPT | Mod: CPTII,S$GLB,, | Performed by: INTERNAL MEDICINE

## 2024-10-14 PROCEDURE — 85045 AUTOMATED RETICULOCYTE COUNT: CPT | Mod: 91 | Performed by: INTERNAL MEDICINE

## 2024-10-14 PROCEDURE — 84403 ASSAY OF TOTAL TESTOSTERONE: CPT | Performed by: UROLOGY

## 2024-10-14 PROCEDURE — 36415 COLL VENOUS BLD VENIPUNCTURE: CPT | Mod: PO | Performed by: INTERNAL MEDICINE

## 2024-10-14 PROCEDURE — 99396 PREV VISIT EST AGE 40-64: CPT | Mod: 25,S$GLB,, | Performed by: INTERNAL MEDICINE

## 2024-10-14 PROCEDURE — 1159F MED LIST DOCD IN RCRD: CPT | Mod: CPTII,S$GLB,, | Performed by: INTERNAL MEDICINE

## 2024-10-14 PROCEDURE — 3074F SYST BP LT 130 MM HG: CPT | Mod: CPTII,S$GLB,, | Performed by: INTERNAL MEDICINE

## 2024-10-14 PROCEDURE — 4010F ACE/ARB THERAPY RXD/TAKEN: CPT | Mod: CPTII,S$GLB,, | Performed by: INTERNAL MEDICINE

## 2024-10-14 PROCEDURE — 82728 ASSAY OF FERRITIN: CPT | Performed by: INTERNAL MEDICINE

## 2024-10-14 PROCEDURE — 1160F RVW MEDS BY RX/DR IN RCRD: CPT | Mod: CPTII,S$GLB,, | Performed by: INTERNAL MEDICINE

## 2024-10-14 PROCEDURE — 90471 IMMUNIZATION ADMIN: CPT | Mod: S$GLB,,, | Performed by: INTERNAL MEDICINE

## 2024-10-14 PROCEDURE — 85025 COMPLETE CBC W/AUTO DIFF WBC: CPT | Performed by: INTERNAL MEDICINE

## 2024-10-14 PROCEDURE — 83540 ASSAY OF IRON: CPT | Performed by: INTERNAL MEDICINE

## 2024-10-14 PROCEDURE — 3078F DIAST BP <80 MM HG: CPT | Mod: CPTII,S$GLB,, | Performed by: INTERNAL MEDICINE

## 2024-10-14 PROCEDURE — 80321 ALCOHOLS BIOMARKERS 1OR 2: CPT | Performed by: INTERNAL MEDICINE

## 2024-10-14 PROCEDURE — 99999 PR PBB SHADOW E&M-EST. PATIENT-LVL IV: CPT | Mod: PBBFAC,,, | Performed by: INTERNAL MEDICINE

## 2024-10-14 RX ORDER — PHENTERMINE HYDROCHLORIDE 37.5 MG/1
18.75 TABLET ORAL 2 TIMES DAILY
COMMUNITY
Start: 2024-10-08

## 2024-10-14 RX ORDER — TOPIRAMATE 25 MG/1
25 TABLET ORAL 2 TIMES DAILY
COMMUNITY
Start: 2024-08-15

## 2024-10-14 RX ORDER — CLONAZEPAM 0.5 MG/1
TABLET ORAL
Qty: 45 TABLET | Refills: 3 | Status: SHIPPED | OUTPATIENT
Start: 2024-10-14

## 2024-10-14 NOTE — PROGRESS NOTES
Subjective     Patient ID: Dev Sweeney is a 49 y.o. male.    Chief Complaint: Annual Exam    HPI  49 y.o. Male here for annual exam.      Vaccines: Influenza (2024); Tetanus (2015)  Eye exam: declined  Colonoscopy: 8/22     Exercise: walks/weights  Diet: regular    Past Medical History:  No date: Allergy  No date: Anemia  No date: Asthma  No date: CHF (congestive heart failure)  No date: Drinking binge  No date: GERD (gastroesophageal reflux disease)  No date: Hiatal hernia  No date: Hyperlipidemia  No date: Hypertension  No date: Hypogonadism in male  No date: Insomnia  No date: Tobacco use  Past Surgical History:  8/18/2022: COLONOSCOPY; N/A      Comment:  Procedure: COLONOSCOPY;  Surgeon: Saud Bui MD;  Location: [facility];  Service: Endoscopy;                Laterality: N/A;  8/26/2020: CORONARY ANGIOGRAPHY; N/A      Comment:  Procedure: ANGIOGRAM, CORONARY ARTERY;  Surgeon: Alan Lao MD;  Location: [facility] CATH LAB;  Service:                Cardiology;  Laterality: N/A;  8/18/2022: ESOPHAGOGASTRODUODENOSCOPY; N/A      Comment:  Procedure: EGD (ESOPHAGOGASTRODUODENOSCOPY);  Surgeon:                Saud Bui MD;  Location: [facility];                 Service: Endoscopy;  Laterality: N/A;  No date: FRACTURE SURGERY  No date: HERNIA REPAIR  8/26/2020: LEFT HEART CATHETERIZATION; Right      Comment:  Procedure: Left heart cath;  Surgeon: Alan Lao MD;  Location: [facility] CATH LAB;  Service:                Cardiology;  Laterality: Right;  No date: ORBITAL FRACTURE SURGERY      Comment:  left  No date: VASECTOMY  Social History    Socioeconomic History      Marital status:     Tobacco Use      Smoking status: Every Day        Packs/day: 1.00        Years: 1 pack/day for 15.0 years (15.0 ttl pk-yrs)        Types: Cigarettes      Smokeless tobacco: Current        Types: Chew      Tobacco comments: trying to quit     Substance and Sexual Activity      Alcohol use: Yes        Alcohol/week: 1.0 standard drink of alcohol        Types: 1 Shots of liquor per week        Comment: socially      Drug use: No      Sexual activity: Yes        Partners: Female    Social Drivers of Health  Financial Resource Strain: Low Risk  (2/4/2024)      Overall Financial Resource Strain (CARDIA)          Difficulty of Paying Living Expenses: Not hard at all  Food Insecurity: No Food Insecurity (2/4/2024)      Hunger Vital Sign          Worried About Running Out of Food in the Last Year: Never true          Ran Out of Food in the Last Year: Never true  Transportation Needs: No Transportation Needs (2/4/2024)      PRAPARE - Transportation          Lack of Transportation (Medical): No          Lack of Transportation (Non-Medical): No  Physical Activity: Insufficiently Active (2/4/2024)      Exercise Vital Sign          Days of Exercise per Week: 1 day          Minutes of Exercise per Session: 30 min  Stress: No Stress Concern Present (2/4/2024)      [employer] [place] [employer] - Occupational Stress Questionnaire          Feeling of Stress : Not at all  Housing Stability: Unknown (2/4/2024)      Housing Stability Vital Sign          Unable to Pay for Housing in the Last Year: No          Number of Places Lived in the Last Year: 1          Unstable Housing in the Last Year: Patient declined  Review of patient's allergies indicates:   -- Penicillins -- Anaphylaxis    --  Other reaction(s): Hives             Other reaction(s): Difficulty breathing  Dev Sweeney had no medications administered during this visit.  Review of Systems   Constitutional:  Negative for activity change, appetite change, chills, diaphoresis, fatigue, fever and unexpected weight change.   HENT:  Negative for nasal congestion, mouth sores, postnasal drip, rhinorrhea, sinus pressure/congestion, sneezing, sore throat, trouble swallowing and voice change.    Eyes:   Negative for discharge, itching and visual disturbance.   Respiratory:  Negative for cough, chest tightness, shortness of breath and wheezing.    Cardiovascular:  Negative for chest pain, palpitations and leg swelling.   Gastrointestinal:  Negative for abdominal pain, blood in stool, constipation, diarrhea, nausea and vomiting.   Endocrine: Negative for cold intolerance and heat intolerance.   Genitourinary:  Negative for difficulty urinating, dysuria, flank pain, hematuria and urgency.   Musculoskeletal:  Negative for arthralgias, back pain, myalgias and neck pain.   Integumentary:  Negative for rash and wound.   Allergic/Immunologic: Negative for environmental allergies and food allergies.   Neurological:  Negative for dizziness, tremors, seizures, syncope, weakness and headaches.   Hematological:  Negative for adenopathy. Does not bruise/bleed easily.   Psychiatric/Behavioral:  Positive for sleep disturbance. Negative for confusion, self-injury and suicidal ideas. The patient is not nervous/anxious.           Objective     Physical Exam  Constitutional:       General: He is not in acute distress.     Appearance: Normal appearance. He is well-developed. He is not ill-appearing, toxic-appearing or diaphoretic.   HENT:      Head: Normocephalic and atraumatic.      Right Ear: External ear normal.      Left Ear: External ear normal.      Nose: Nose normal.      Mouth/Throat:      Pharynx: No oropharyngeal exudate.   Eyes:      General: No scleral icterus.        Right eye: No discharge.         Left eye: No discharge.      Extraocular Movements: Extraocular movements intact.      Conjunctiva/sclera: Conjunctivae normal.      Pupils: Pupils are equal, round, and reactive to light.   Neck:      Thyroid: No thyromegaly.      Vascular: No JVD.   Cardiovascular:      Rate and Rhythm: Normal rate and regular rhythm.      Pulses: Normal pulses.      Heart sounds: Normal heart sounds. No murmur heard.  Pulmonary:      Effort:  Pulmonary effort is normal. No respiratory distress.      Breath sounds: Normal breath sounds. No wheezing or rales.   Abdominal:      General: Bowel sounds are normal. There is no distension.      Palpations: Abdomen is soft.      Tenderness: There is no abdominal tenderness. There is no right CVA tenderness, left CVA tenderness, guarding or rebound.   Musculoskeletal:      Cervical back: Normal range of motion and neck supple. No rigidity.      Right lower leg: No edema.      Left lower leg: No edema.   Lymphadenopathy:      Cervical: No cervical adenopathy.   Skin:     General: Skin is warm and dry.      Capillary Refill: Capillary refill takes less than 2 seconds.      Coloration: Skin is not pale.      Findings: No rash.   Neurological:      General: No focal deficit present.      Mental Status: He is alert and oriented to person, place, and time. Mental status is at baseline.      Cranial Nerves: No cranial nerve deficit.      Sensory: No sensory deficit.      Motor: No weakness.      Coordination: Coordination normal.      Gait: Gait normal.      Deep Tendon Reflexes: Reflexes normal.   Psychiatric:         Mood and Affect: Mood normal.         Behavior: Behavior normal.         Thought Content: Thought content normal.         Judgment: Judgment normal.            Assessment and Plan     1. Annual physical exam    2. Hypertension, unspecified type    3. Chronic combined systolic and diastolic heart failure    4. Nonischemic cardiomyopathy    5. Hypercholesteremia    6. Hypogonadism in male    7. Obesity (BMI 30-39.9)    8. Gastroesophageal reflux disease, unspecified whether esophagitis present    9. Hiatal hernia    10. Tobacco dependence    11. Insomnia, unspecified type  -     clonazePAM (KLONOPIN) 0.5 MG tablet; 1-2 tabs PO qHS PRN insomnia  Dispense: 45 tablet; Refill: 3    12. Drinking binge       Blood work reviewed with pt     HTN- stable on current meds      Insomnia- stable on Klonopin 0.5 mg qHS  PRN, d/c Elavil      CHF/nonischemic cardiomyopathy 2/2 ETOH use- no s/s of volume overload on exam    -followed by cardio      Binge drinking- improving on Antabuse       GERD/Hiatal hernia- stable on PPI      Tobacco use- on Chantix(pt denies any SI/HI, MDD)   -pt stopped smoking on Aug 31th.       Hypogonadism- stable on TRT      Microcytic anemia- check iron studies       F/u in 6 months

## 2024-10-15 RX ORDER — CARVEDILOL 25 MG/1
TABLET ORAL
Qty: 180 TABLET | Refills: 3 | Status: SHIPPED | OUTPATIENT
Start: 2024-10-15

## 2024-10-15 NOTE — TELEPHONE ENCOUNTER
No care due was identified.  [facility] Embedded Care Due Messages. Reference number: [id number].   10/15/2024 12:18:45 AM CDT

## 2024-10-15 NOTE — TELEPHONE ENCOUNTER
Refill Decision Note   Dev Sweeney  is requesting a refill authorization.  Brief Assessment and Rationale for Refill:  Approve     Medication Therapy Plan:        Alert overridden per protocol: Yes   Pharmacist review requested: Yes   Comments:     Note composed:10:16 AM 10/15/2024

## 2024-10-15 NOTE — TELEPHONE ENCOUNTER
Refill Routing Note   Medication(s) are not appropriate for processing by [facility] for the following reason(s):     DDI not previously overridden by current provider--after initial override, the [facility] Center will be able to continue overrides      Drug-disease interaction carvediloL and Asthma, unspecified asthma severity, unspecified whether complicated, unspecified whether persistent     [facility] action(s):  Defer           Pharmacist review requested: Yes     Appointments  past 12m or future 3m with PCP    Date Provider   Last Visit   10/14/2024 Loki Alba, DO   Next Visit   Visit date not found Loki Alba, DO   ED visits in past 90 days: 0        Note composed:8:26 AM 10/15/2024

## 2024-10-16 ENCOUNTER — TELEPHONE (OUTPATIENT)
Dept: INTERNAL MEDICINE | Facility: CLINIC | Age: 49
End: 2024-10-16
Payer: COMMERCIAL

## 2024-10-16 DIAGNOSIS — E66.9 OBESITY (BMI 30-39.9): ICD-10-CM

## 2024-10-16 DIAGNOSIS — I10 HYPERTENSION, UNSPECIFIED TYPE: Primary | ICD-10-CM

## 2024-10-16 DIAGNOSIS — R79.9 ABNORMAL FINDING OF BLOOD CHEMISTRY, UNSPECIFIED: ICD-10-CM

## 2024-10-17 LAB
CLINICAL BIOCHEMIST REVIEW: NORMAL
PLPETH BLD-MCNC: 95 NG/ML
POPETH BLD-MCNC: 123 NG/ML

## 2024-10-29 ENCOUNTER — OFFICE VISIT (OUTPATIENT)
Dept: UROLOGY | Facility: CLINIC | Age: 49
End: 2024-10-29
Payer: COMMERCIAL

## 2024-10-29 DIAGNOSIS — E29.1 HYPOGONADISM MALE: Primary | ICD-10-CM

## 2024-10-29 PROCEDURE — 1160F RVW MEDS BY RX/DR IN RCRD: CPT | Mod: CPTII,95,, | Performed by: UROLOGY

## 2024-10-29 PROCEDURE — 1159F MED LIST DOCD IN RCRD: CPT | Mod: CPTII,95,, | Performed by: UROLOGY

## 2024-10-29 PROCEDURE — 4010F ACE/ARB THERAPY RXD/TAKEN: CPT | Mod: CPTII,95,, | Performed by: UROLOGY

## 2024-10-29 PROCEDURE — 99214 OFFICE O/P EST MOD 30 MIN: CPT | Mod: 95,,, | Performed by: UROLOGY

## 2024-10-29 RX ORDER — TESTOSTERONE CYPIONATE 200 MG/ML
300 INJECTION, SOLUTION INTRAMUSCULAR
Qty: 10 ML | Refills: 5 | Status: SHIPPED | OUTPATIENT
Start: 2024-10-29 | End: 2025-04-29

## 2024-11-01 ENCOUNTER — TELEPHONE (OUTPATIENT)
Dept: INTERNAL MEDICINE | Facility: CLINIC | Age: 49
End: 2024-11-01
Payer: COMMERCIAL

## 2024-11-01 DIAGNOSIS — D50.9 IRON DEFICIENCY ANEMIA, UNSPECIFIED IRON DEFICIENCY ANEMIA TYPE: Primary | ICD-10-CM

## 2025-02-18 DIAGNOSIS — F10.10 ALCOHOL CONSUMPTION BINGE DRINKING: ICD-10-CM

## 2025-02-18 RX ORDER — DISULFIRAM 250 MG/1
250 TABLET ORAL
Qty: 90 TABLET | Refills: 1 | Status: SHIPPED | OUTPATIENT
Start: 2025-02-18

## 2025-02-18 NOTE — TELEPHONE ENCOUNTER
Refill Routing Note   Medication(s) are not appropriate for processing by [facility] for the following reason(s):        Outside of protocol    [facility] action(s):  Route   Requires appointment : Yes   Requires labs : Yes             Appointments  past 12m or future 3m with PCP    Date Provider   Last Visit   10/14/2024 Loki Alba, DO   Next Visit   Visit date not found Loki Alba, DO   ED visits in past 90 days: 0        Note composed:10:42 AM 02/18/2025

## 2025-02-18 NOTE — TELEPHONE ENCOUNTER
Care Due:                  Date            Visit Type   Department     Provider  --------------------------------------------------------------------------------                                EP -                              PRIMARY      MET INTERNAL  Last Visit: 10-      CARE ([facility])   MEDICINE       Loki Alba  Next Visit: None Scheduled  None         None Found                                                            Last  Test          Frequency    Reason                     Performed    Due Date  --------------------------------------------------------------------------------    Office Visit  6 months...  disulfiram, varenicline..  10-   04-    CBC.........  6 months...  disulfiram...............  10-   04-    CMP.........  6 months...  disulfiram...............  06- 12-    [employer] Embedded Care Due Messages. Reference number: [id number].   2/18/2025 12:11:15 AM CST

## 2025-03-21 ENCOUNTER — PATIENT MESSAGE (OUTPATIENT)
Dept: UROLOGY | Facility: CLINIC | Age: 50
End: 2025-03-21
Payer: COMMERCIAL

## 2025-03-21 DIAGNOSIS — E29.1 HYPOGONADISM MALE: ICD-10-CM

## 2025-03-21 RX ORDER — TESTOSTERONE CYPIONATE 200 MG/ML
300 INJECTION, SOLUTION INTRAMUSCULAR
Qty: 10 ML | Refills: 5 | Status: SHIPPED | OUTPATIENT
Start: 2025-03-21 | End: 2025-09-19

## 2025-04-03 ENCOUNTER — TELEPHONE (OUTPATIENT)
Dept: UROLOGY | Facility: CLINIC | Age: 50
End: 2025-04-03
Payer: COMMERCIAL

## 2025-04-08 ENCOUNTER — LAB VISIT (OUTPATIENT)
Dept: LAB | Facility: HOSPITAL | Age: 50
End: 2025-04-08

## 2025-04-08 DIAGNOSIS — E29.1 HYPOGONADISM MALE: ICD-10-CM

## 2025-04-08 DIAGNOSIS — R79.9 ABNORMAL FINDING OF BLOOD CHEMISTRY, UNSPECIFIED: ICD-10-CM

## 2025-04-08 DIAGNOSIS — D50.9 IRON DEFICIENCY ANEMIA, UNSPECIFIED IRON DEFICIENCY ANEMIA TYPE: ICD-10-CM

## 2025-04-08 DIAGNOSIS — I10 HYPERTENSION, UNSPECIFIED TYPE: ICD-10-CM

## 2025-04-08 DIAGNOSIS — E66.9 OBESITY (BMI 30-39.9): ICD-10-CM

## 2025-04-08 LAB
ABSOLUTE EOSINOPHIL (OHS): 0.23 K/UL
ABSOLUTE MONOCYTE (OHS): 0.85 K/UL (ref 0.3–1)
ABSOLUTE NEUTROPHIL COUNT (OHS): 5.71 K/UL (ref 1.8–7.7)
ALBUMIN SERPL BCP-MCNC: 4 G/DL (ref 3.5–5.2)
ALP SERPL-CCNC: 50 UNIT/L (ref 40–150)
ALT SERPL W/O P-5'-P-CCNC: 35 UNIT/L (ref 10–44)
ANION GAP (OHS): 9 MMOL/L (ref 8–16)
AST SERPL-CCNC: 32 UNIT/L (ref 11–45)
BASOPHILS # BLD AUTO: 0.06 K/UL
BASOPHILS NFR BLD AUTO: 0.7 %
BILIRUB SERPL-MCNC: 0.9 MG/DL (ref 0.1–1)
BUN SERPL-MCNC: 13 MG/DL (ref 6–20)
CALCIUM SERPL-MCNC: 9.1 MG/DL (ref 8.7–10.5)
CHLORIDE SERPL-SCNC: 101 MMOL/L (ref 95–110)
CO2 SERPL-SCNC: 24 MMOL/L (ref 23–29)
CREAT SERPL-MCNC: 0.9 MG/DL (ref 0.5–1.4)
EAG (OHS): 88 MG/DL (ref 68–131)
ERYTHROCYTE [DISTWIDTH] IN BLOOD BY AUTOMATED COUNT: 14.6 % (ref 11.5–14.5)
FERRITIN SERPL-MCNC: 88 NG/ML (ref 20–300)
GFR SERPLBLD CREATININE-BSD FMLA CKD-EPI: >60 ML/MIN/1.73/M2
GLUCOSE SERPL-MCNC: 76 MG/DL (ref 70–110)
HBA1C MFR BLD: 4.7 % (ref 4–5.6)
HCT VFR BLD AUTO: 55.6 % (ref 40–54)
HGB BLD-MCNC: 18.7 GM/DL (ref 14–18)
IMM GRANULOCYTES # BLD AUTO: 0.03 K/UL (ref 0–0.04)
IMM GRANULOCYTES NFR BLD AUTO: 0.3 % (ref 0–0.5)
IRON SATN MFR SERPL: 35 % (ref 20–50)
IRON SERPL-MCNC: 121 UG/DL (ref 45–160)
LYMPHOCYTES # BLD AUTO: 1.91 K/UL (ref 1–4.8)
MCH RBC QN AUTO: 30.8 PG (ref 27–31)
MCHC RBC AUTO-ENTMCNC: 33.6 G/DL (ref 32–36)
MCV RBC AUTO: 91 FL (ref 82–98)
NUCLEATED RBC (/100WBC) (OHS): 0 /100 WBC
PLATELET # BLD AUTO: 170 K/UL (ref 150–450)
PMV BLD AUTO: 10.7 FL (ref 9.2–12.9)
POTASSIUM SERPL-SCNC: 4.1 MMOL/L (ref 3.5–5.1)
PROT SERPL-MCNC: 6.6 GM/DL (ref 6–8.4)
PSA SERPL-MCNC: 0.39 NG/ML
RBC # BLD AUTO: 6.08 M/UL (ref 4.6–6.2)
RELATIVE EOSINOPHIL (OHS): 2.6 %
RELATIVE LYMPHOCYTE (OHS): 21.7 % (ref 18–48)
RELATIVE MONOCYTE (OHS): 9.7 % (ref 4–15)
RELATIVE NEUTROPHIL (OHS): 65 % (ref 38–73)
RETICS/RBC NFR AUTO: 1.9 % (ref 0.4–2)
SODIUM SERPL-SCNC: 134 MMOL/L (ref 136–145)
TESTOST SERPL-MCNC: 768 NG/DL (ref 304–1227)
TIBC SERPL-MCNC: 346 UG/DL (ref 250–450)
TRANSFERRIN SERPL-MCNC: 234 MG/DL (ref 200–375)
WBC # BLD AUTO: 8.79 K/UL (ref 3.9–12.7)

## 2025-04-08 PROCEDURE — 83036 HEMOGLOBIN GLYCOSYLATED A1C: CPT

## 2025-04-08 PROCEDURE — 36415 COLL VENOUS BLD VENIPUNCTURE: CPT | Mod: PO

## 2025-04-08 PROCEDURE — 84153 ASSAY OF PSA TOTAL: CPT

## 2025-04-08 PROCEDURE — 80053 COMPREHEN METABOLIC PANEL: CPT

## 2025-04-08 PROCEDURE — 84403 ASSAY OF TOTAL TESTOSTERONE: CPT

## 2025-04-08 PROCEDURE — 83540 ASSAY OF IRON: CPT

## 2025-04-08 PROCEDURE — 82728 ASSAY OF FERRITIN: CPT

## 2025-04-08 PROCEDURE — 85045 AUTOMATED RETICULOCYTE COUNT: CPT

## 2025-04-08 PROCEDURE — 85025 COMPLETE CBC W/AUTO DIFF WBC: CPT

## 2025-04-09 ENCOUNTER — TELEPHONE (OUTPATIENT)
Dept: UROLOGY | Facility: CLINIC | Age: 50
End: 2025-04-09

## 2025-04-09 ENCOUNTER — OFFICE VISIT (OUTPATIENT)
Dept: UROLOGY | Facility: CLINIC | Age: 50
End: 2025-04-09
Attending: UROLOGY

## 2025-04-09 VITALS
OXYGEN SATURATION: 96 % | HEIGHT: 72 IN | HEART RATE: 93 BPM | SYSTOLIC BLOOD PRESSURE: 103 MMHG | BODY MASS INDEX: 33.39 KG/M2 | WEIGHT: 246.5 LBS | DIASTOLIC BLOOD PRESSURE: 72 MMHG

## 2025-04-09 DIAGNOSIS — E29.1 HYPOGONADISM MALE: Primary | ICD-10-CM

## 2025-04-09 DIAGNOSIS — D75.1 POLYCYTHEMIA: ICD-10-CM

## 2025-04-09 PROCEDURE — 99214 OFFICE O/P EST MOD 30 MIN: CPT | Mod: S$GLB,,, | Performed by: UROLOGY

## 2025-04-09 RX ORDER — TESTOSTERONE CYPIONATE 200 MG/ML
300 INJECTION, SOLUTION INTRAMUSCULAR
Qty: 10 ML | Refills: 5 | Status: SHIPPED | OUTPATIENT
Start: 2025-04-09 | End: 2025-10-08

## 2025-04-09 NOTE — PROGRESS NOTES
Subjective:       Dev Sweeney is a 49 y.o. male who is an established patient who was referred by Dr. Loki Alba  for evaluation of low T.      Recent blood work with low testosterone. Also with significantly low T in chart from 2004. Reports decreased energy and low libido. Also with ED issues. Took Viagra in the past from online purchase that was helpful. Not working out as much as previously. Vasectomy in 2004.      PMH: CHF (EF 10% initially, recently reported to be 45%), HTN, smoker    10/21 T  - 102 (2004 - 106), PSA - 0.62   10/21 T - 2355, low FSH/LJ  11/21 T - 152, free 31.3  2/22 T - 111, PSA - 0.30 (6 days prior to next injection)  4/23 T - 1084, PSA - 0.6, Hgb - 11.7  4/24 T - 216, PSA - 0.33, Hgb - 12.7  10/24 T - 571  4/25 T - 768, PSA - 0.39, Hgb - 18.7     Repeat T check shows a drastically elevated testosterone (2355). LH and FSH low. Prolactin normal. He  reports taking exogenous testosterone from someone at the gym. Two doses in one week. He may have had some increased energy and increased sex drive noted in the last week.     Virtual visit. Started on TRT injections - 400mg q4wks. He has noted improvement of symptoms though notes low T symptoms return at 2 weeks.     5/3/2023  Virtual visit. Doing well with TRT 300mg [id number]. Doing well with this regimen. Labs good.    4/29/2024  Virtual visit. Doing injections but doing 200mg q10d due to problems with getting the rx from the pharmacy. Feels he is not getting as good as treatment response as previously. Last T level lower (day 8 after injection).     10/29/2024  Virtual visit. T level normal. Doing well with TRT 300mg q14d. Symptoms well controlled with current dosing. Needs refills.     4/9/2025  Doing well, T level normal. He feels some more fatigue/reduced energy recently. Hgb elevated now at 18.7. He notes he usually donates blood regularly due to be O neg but has not done so recently.           The following portions of the  patient's history were reviewed and updated as appropriate: allergies, current medications, past family history, past medical history, past social history, past surgical history and problem list.    Review of Systems  Twelve point review of systems completed. Pertinent positive and negatives listed in HPI.      Objective:    Vitals: /72 (BP Location: Left arm, Patient Position: Sitting)   Pulse 93   Ht 6' (1.829 m)   Wt 111.8 kg (246 lb 7.6 oz)   SpO2 96%   BMI 33.43 kg/m²     Physical Exam -  virtual visit  General: well developed, well nourished in no acute distress  Head: normocephalic, atraumatic  Neck: supple, trachea midline, no obvious enlargement of thyroid  HEENT: EOMI, mucus membranes moist, sclera anicteric, no hearing impairment  Lungs: symmetric expansion, non-labored breathing  Neuro: alert and oriented x 3, no gross deficits  Psych: normal judgment and insight, normal mood/affect and non-anxious  Genitourinary: (last visit)  Penis -  circumcised penis without plaques, lesions, or scarring.  Urethra - orthotopic location without stenosis.  Scrotum  - no lesions or rashes, no hydrocele or hernia.  Testes - descended bilaterally, symmetric without masses, non tender.  Epididymides - no cysts or lesions, no spermatocele, symmetric.   CORINNE: deferred      Lab Review   Urine analysis today in clinic shows no urine     Lab Results   Component Value Date    WBC 8.79 04/08/2025    HGB 18.7 (H) 04/08/2025    HGB 16.3 10/14/2024    HCT 55.6 (H) 04/08/2025    HCT 52.4 10/14/2024    MCV 91 04/08/2025    MCV 79 (L) 10/14/2024     04/08/2025     10/14/2024     Lab Results   Component Value Date    CREATININE 0.9 04/08/2025    BUN 13 04/08/2025     Lab Results   Component Value Date    PSA 0.39 04/08/2025    PSA 0.55 09/06/2023     Lab Results   Component Value Date    PSADIAG 0.33 04/23/2024       Imaging  NA         Assessment/Plan:      1. Hypogonadism male    - Significantly low total T  level   - Normal  exam   - Testosterone panel   - Prolactin, FSH, LH   - May need endocrine referral   - May need PDE5i for ED in addition to TRT     - T level now very elevated after taking exogenous testosterone. Cautioned on use.   - TRT started 11/2021 - prefers self-injection.    - 400mg q28d to start. Wearing off too soon.    - Changed dosing to 300mg q2wks. Refilled today. Dosing adequate currently.   - Recheck T and CBC in 3 months.       - Discussed hypogonadism, common symptoms, treatment options, and the risks and benefits of treatment.  His symptoms and blood tests support the diagnosis and therefore treatment is appropriate.   - Discussed the various risks associated with TRT, specifically a possible increase in risk of heart disease, MI, CVA, PE, DVT. Also discussed the issues related to testosterone replacement and prostate cancer. TRT does not increase his risk of developing cancer. Recommend annual CORINNE and PSA for PCa screening. TRT recommended to stop if abnormal CORINNE or elevated PSA.   - Treatment options reviewed: regular injections, topical treatments including gels and creams, and implants such as TestoPel.  Risks and benefits of each were reviewed specifically T fluctuations with q2-4week injections and risk of transfer of medication to other with topical application.       2. Polycythemia   - Discussed TRT induced polycythemia. Risks of cardiac event.   - Start therapeutic phlebotomy (blood donation) q3mths, may need to increase pending monitoring      Follow up 3 months with labs

## 2025-04-09 NOTE — TELEPHONE ENCOUNTER
----- Message from Eunice Gallegos MD sent at 4/9/2025 11:16 AM CDT -----  3 months labs and virtual visit

## 2025-04-26 ENCOUNTER — RESULTS FOLLOW-UP (OUTPATIENT)
Dept: INTERNAL MEDICINE | Facility: CLINIC | Age: 50
End: 2025-04-26

## 2025-05-08 DIAGNOSIS — G47.00 INSOMNIA, UNSPECIFIED TYPE: ICD-10-CM

## 2025-05-08 NOTE — TELEPHONE ENCOUNTER
Patient does not currently have insurance and would like to fill only the rosuvastatin since a visit is not required.

## 2025-05-08 NOTE — TELEPHONE ENCOUNTER
No care due was identified.  [facility] Embedded Care Due Messages. Reference number: [id number].   5/08/2025 3:13:42 PM CDT

## 2025-05-09 RX ORDER — ROSUVASTATIN CALCIUM 10 MG/1
10 TABLET, COATED ORAL DAILY
Qty: 90 TABLET | Refills: 2 | Status: SHIPPED | OUTPATIENT
Start: 2025-05-09

## 2025-05-09 RX ORDER — CLONAZEPAM 0.5 MG/1
TABLET ORAL
Qty: 45 TABLET | Refills: 3 | Status: SHIPPED | OUTPATIENT
Start: 2025-05-09

## 2025-06-30 ENCOUNTER — LAB VISIT (OUTPATIENT)
Dept: LAB | Facility: HOSPITAL | Age: 50
End: 2025-06-30
Attending: STUDENT IN AN ORGANIZED HEALTH CARE EDUCATION/TRAINING PROGRAM

## 2025-06-30 DIAGNOSIS — E78.00 HYPERCHOLESTEREMIA: ICD-10-CM

## 2025-06-30 DIAGNOSIS — E29.1 HYPOGONADISM MALE: ICD-10-CM

## 2025-06-30 DIAGNOSIS — I10 PRIMARY HYPERTENSION: ICD-10-CM

## 2025-06-30 DIAGNOSIS — D75.1 POLYCYTHEMIA: ICD-10-CM

## 2025-06-30 LAB
ABSOLUTE EOSINOPHIL (OHS): 0.22 K/UL
ABSOLUTE MONOCYTE (OHS): 0.77 K/UL (ref 0.3–1)
ABSOLUTE NEUTROPHIL COUNT (OHS): 4.62 K/UL (ref 1.8–7.7)
ALBUMIN SERPL BCP-MCNC: 4.5 G/DL (ref 3.5–5.2)
ALP SERPL-CCNC: 51 UNIT/L (ref 40–150)
ALT SERPL W/O P-5'-P-CCNC: 26 UNIT/L (ref 10–44)
ANION GAP (OHS): 11 MMOL/L (ref 8–16)
AST SERPL-CCNC: 21 UNIT/L (ref 11–45)
BASOPHILS # BLD AUTO: 0.07 K/UL
BASOPHILS NFR BLD AUTO: 0.9 %
BILIRUB SERPL-MCNC: 0.7 MG/DL (ref 0.1–1)
BUN SERPL-MCNC: 12 MG/DL (ref 6–20)
CALCIUM SERPL-MCNC: 9.4 MG/DL (ref 8.7–10.5)
CHLORIDE SERPL-SCNC: 106 MMOL/L (ref 95–110)
CHOLEST SERPL-MCNC: 118 MG/DL (ref 120–199)
CHOLEST/HDLC SERPL: 4.5 {RATIO} (ref 2–5)
CO2 SERPL-SCNC: 23 MMOL/L (ref 23–29)
CREAT SERPL-MCNC: 1 MG/DL (ref 0.5–1.4)
ERYTHROCYTE [DISTWIDTH] IN BLOOD BY AUTOMATED COUNT: 13.2 % (ref 11.5–14.5)
GFR SERPLBLD CREATININE-BSD FMLA CKD-EPI: >60 ML/MIN/1.73/M2
GLUCOSE SERPL-MCNC: 111 MG/DL (ref 70–110)
HCT VFR BLD AUTO: 54.8 % (ref 40–54)
HDLC SERPL-MCNC: 26 MG/DL (ref 40–75)
HDLC SERPL: 22 % (ref 20–50)
HGB BLD-MCNC: 17.9 GM/DL (ref 14–18)
IMM GRANULOCYTES # BLD AUTO: 0.03 K/UL (ref 0–0.04)
IMM GRANULOCYTES NFR BLD AUTO: 0.4 % (ref 0–0.5)
LDLC SERPL CALC-MCNC: 53.2 MG/DL (ref 63–159)
LYMPHOCYTES # BLD AUTO: 2.18 K/UL (ref 1–4.8)
MCH RBC QN AUTO: 30 PG (ref 27–31)
MCHC RBC AUTO-ENTMCNC: 32.7 G/DL (ref 32–36)
MCV RBC AUTO: 92 FL (ref 82–98)
NONHDLC SERPL-MCNC: 92 MG/DL
NUCLEATED RBC (/100WBC) (OHS): 0 /100 WBC
PLATELET # BLD AUTO: 178 K/UL (ref 150–450)
PMV BLD AUTO: 10.6 FL (ref 9.2–12.9)
POTASSIUM SERPL-SCNC: 3.9 MMOL/L (ref 3.5–5.1)
PROT SERPL-MCNC: 7 GM/DL (ref 6–8.4)
RBC # BLD AUTO: 5.97 M/UL (ref 4.6–6.2)
RELATIVE EOSINOPHIL (OHS): 2.8 %
RELATIVE LYMPHOCYTE (OHS): 27.6 % (ref 18–48)
RELATIVE MONOCYTE (OHS): 9.8 % (ref 4–15)
RELATIVE NEUTROPHIL (OHS): 58.5 % (ref 38–73)
SODIUM SERPL-SCNC: 140 MMOL/L (ref 136–145)
TESTOST SERPL-MCNC: 265 NG/DL (ref 304–1227)
TRIGL SERPL-MCNC: 194 MG/DL (ref 30–150)
WBC # BLD AUTO: 7.89 K/UL (ref 3.9–12.7)

## 2025-06-30 PROCEDURE — 84403 ASSAY OF TOTAL TESTOSTERONE: CPT

## 2025-06-30 PROCEDURE — 82374 ASSAY BLOOD CARBON DIOXIDE: CPT

## 2025-06-30 PROCEDURE — 83718 ASSAY OF LIPOPROTEIN: CPT

## 2025-06-30 PROCEDURE — 36415 COLL VENOUS BLD VENIPUNCTURE: CPT | Mod: PO

## 2025-06-30 PROCEDURE — 85025 COMPLETE CBC W/AUTO DIFF WBC: CPT

## 2025-07-01 RX ORDER — OMEPRAZOLE 40 MG/1
40 CAPSULE, DELAYED RELEASE ORAL EVERY MORNING
Qty: 90 CAPSULE | Refills: 1 | Status: SHIPPED | OUTPATIENT
Start: 2025-07-01

## 2025-07-01 NOTE — TELEPHONE ENCOUNTER
No care due was identified.  [facility] Embedded Care Due Messages. Reference number: [id number].   7/01/2025 12:05:23 AM CDT

## 2025-07-01 NOTE — TELEPHONE ENCOUNTER
Refill Decision Note   Dev Sweeney  is requesting a refill authorization.  Brief Assessment and Rationale for Refill:  Approve     Medication Therapy Plan:         Comments:     Note composed:9:11 AM 07/01/2025

## 2025-07-08 NOTE — PROGRESS NOTES
Subjective:       Dev Sweeney is a 50 y.o. male who is an established patient who was referred by Dr. Loki Alba  for evaluation of low T.      Recent blood work with low testosterone. Also with significantly low T in chart from 2004. Reports decreased energy and low libido. Also with ED issues. Took Viagra in the past from online purchase that was helpful. Not working out as much as previously. Vasectomy in 2004.      PMH: CHF (EF 10% initially, recently reported to be 45%), HTN, smoker    10/21 T  - 102 (2004 - 106), PSA - 0.62   10/21 T - 2355, low FSH/LJ  11/21 T - 152, free 31.3  2/22 T - 111, PSA - 0.30 (6 days prior to next injection)  4/23 T - 1084, PSA - 0.6, Hgb - 11.7  4/24 T - 216, PSA - 0.33, Hgb - 12.7  10/24 T - 571  4/25 T - 768, PSA - 0.39, Hgb - 18.7  6/25 T - 265, Hgb - 17.9 (10d after shot)     Repeat T check shows a drastically elevated testosterone (2355). LH and FSH low. Prolactin normal. He  reports taking exogenous testosterone from someone at the gym. Two doses in one week. He may have had some increased energy and increased sex drive noted in the last week.     Virtual visit. Started on TRT injections - 400mg q4wks. He has noted improvement of symptoms though notes low T symptoms return at 2 weeks.     5/3/2023  Virtual visit. Doing well with TRT 300mg [id number]. Doing well with this regimen. Labs good.    4/29/2024  Virtual visit. Doing injections but doing 200mg q10d due to problems with getting the rx from the pharmacy. Feels he is not getting as good as treatment response as previously. Last T level lower (day 8 after injection).     10/29/2024  Virtual visit. T level normal. Doing well with TRT 300mg q14d. Symptoms well controlled with current dosing. Needs refills.     4/9/2025  Doing well, T level normal. He feels some more fatigue/reduced energy recently. Hgb elevated now at 18.7. He notes he usually donates blood regularly due to be O neg but has not done so  recently.     7/8/2025  Virtual visit. Hgb more in normal range though still elevated. Recent T level low. TRT 300mg IM [id number]. He notes sluggish feeling about 4 days prior to next shot. Otherwise doing well. Donated blood in April. Also on Fe supplement per PCP.      The following portions of the patient's history were reviewed and updated as appropriate: allergies, current medications, past family history, past medical history, past social history, past surgical history and problem list.    Review of Systems  Twelve point review of systems completed. Pertinent positive and negatives listed in HPI.      Objective:    Vitals: There were no vitals taken for this visit.    Physical Exam -  virtual visit  General: well developed, well nourished in no acute distress  Head: normocephalic, atraumatic  Neck: supple, trachea midline, no obvious enlargement of thyroid  HEENT: EOMI, mucus membranes moist, sclera anicteric, no hearing impairment  Lungs: symmetric expansion, non-labored breathing  Neuro: alert and oriented x 3, no gross deficits  Psych: normal judgment and insight, normal mood/affect and non-anxious  Genitourinary: (last visit)  Penis -  circumcised penis without plaques, lesions, or scarring.  Urethra - orthotopic location without stenosis.  Scrotum  - no lesions or rashes, no hydrocele or hernia.  Testes - descended bilaterally, symmetric without masses, non tender.  Epididymides - no cysts or lesions, no spermatocele, symmetric.   CORINNE: deferred      Lab Review   Urine analysis today in clinic shows no urine     Lab Results   Component Value Date    WBC 7.89 06/30/2025    HGB 17.9 06/30/2025    HGB 16.3 10/14/2024    HCT 54.8 (H) 06/30/2025    HCT 52.4 10/14/2024    MCV 92 06/30/2025    MCV 79 (L) 10/14/2024     06/30/2025     10/14/2024     Lab Results   Component Value Date    CREATININE 1.0 06/30/2025    BUN 12 06/30/2025     Lab Results   Component Value Date    PSA 0.39 04/08/2025    PSA  0.55 09/06/2023     Lab Results   Component Value Date    PSADIAG 0.33 04/23/2024       Imaging  NA         Assessment/Plan:      1. Hypogonadism male    - Significantly low total T level   - Normal  exam   - Testosterone panel   - Prolactin, FSH, LH   - May need endocrine referral   - May need PDE5i for ED in addition to TRT     - T level now very elevated after taking exogenous testosterone. Cautioned on use.   - TRT started 11/2021 - prefers self-injection.    - 400mg q28d to start. Changed to 300mg q2wks, still wearing off too soon.    - Changed dosing to 300mg q10wks.    - Recheck T, PSA, and CBC in 6 months.       - Discussed hypogonadism, common symptoms, treatment options, and the risks and benefits of treatment.  His symptoms and blood tests support the diagnosis and therefore treatment is appropriate.   - Discussed the various risks associated with TRT, specifically a possible increase in risk of heart disease, MI, CVA, PE, DVT. Also discussed the issues related to testosterone replacement and prostate cancer. TRT does not increase his risk of developing cancer. Recommend annual CORINNE and PSA for PCa screening. TRT recommended to stop if abnormal CORINNE or elevated PSA.   - Treatment options reviewed: regular injections, topical treatments including gels and creams, and implants such as TestoPel.  Risks and benefits of each were reviewed specifically T fluctuations with q2-4week injections and risk of transfer of medication to other with topical application.       2. Polycythemia   - Discussed TRT induced polycythemia. Risks of cardiac event.   - Continue therapeutic phlebotomy (blood donation) q3mths, may need to increase pending monitoring      Follow up 6 months with labs            The patient location is: [place]  The chief complaint leading to consultation is: low T    Visit type: audiovisual    Face to Face time with patient: 12min  16 minutes of total time spent on the encounter, which includes face  to face time and non-face to face time preparing to see the patient (eg, review of tests), Obtaining and/or reviewing separately obtained history, Documenting clinical information in the electronic or other health record, Independently interpreting results (not separately reported) and communicating results to the patient/family/caregiver, or Care coordination (not separately reported).         Each patient to whom he or she provides medical services by telemedicine is:  (1) informed of the relationship between the physician and patient and the respective role of any other health care provider with respect to management of the patient; and (2) notified that he or she may decline to receive medical services by telemedicine and may withdraw from such care at any time.    Notes:

## 2025-07-09 ENCOUNTER — OFFICE VISIT (OUTPATIENT)
Dept: UROLOGY | Facility: CLINIC | Age: 50
End: 2025-07-09
Attending: UROLOGY

## 2025-07-09 DIAGNOSIS — D75.1 POLYCYTHEMIA: Primary | ICD-10-CM

## 2025-07-09 DIAGNOSIS — E29.1 HYPOGONADISM MALE: ICD-10-CM

## 2025-07-09 RX ORDER — TESTOSTERONE CYPIONATE 200 MG/ML
300 INJECTION, SOLUTION INTRAMUSCULAR
Qty: 10 ML | Refills: 5 | Status: SHIPPED | OUTPATIENT
Start: 2025-07-09 | End: 2026-01-07

## 2025-07-20 RX ORDER — CARVEDILOL 25 MG/1
25 TABLET ORAL 2 TIMES DAILY WITH MEALS
Qty: 180 TABLET | Refills: 0 | Status: SHIPPED | OUTPATIENT
Start: 2025-07-20

## 2025-07-20 NOTE — TELEPHONE ENCOUNTER
Care Due:                  Date            Visit Type   Department     Provider  --------------------------------------------------------------------------------                                EP -                              PRIMARY      MET INTERNAL  Last Visit: 10-      CARE ([facility])   MEDICINE       Loki Alba  Next Visit: None Scheduled  None         None Found                                                            Last  Test          Frequency    Reason                     Performed    Due Date  --------------------------------------------------------------------------------    Office Visit  6 months...  disulfiram, varenicline..  10-   04-    [facility] Embedded Care Due Messages. Reference number: [id number].   7/20/2025 6:56:02 AM CDT

## 2025-07-21 DIAGNOSIS — F17.200 TOBACCO DEPENDENCE: ICD-10-CM

## 2025-07-21 RX ORDER — VARENICLINE TARTRATE 1 MG/1
1 TABLET, FILM COATED ORAL 2 TIMES DAILY
Qty: 60 TABLET | Refills: 2 | Status: SHIPPED | OUTPATIENT
Start: 2025-07-21

## 2025-07-21 NOTE — TELEPHONE ENCOUNTER
Provider Staff:  Action required for this patient    Requires appointment      Please see care gap opportunities below in Care Due Message.    Thanks!  [facility]     Appointments      Date Provider   Last Visit   10/14/2024 Loki Alba,    Next Visit   Visit date not found Loki Alba, DO     Refill Decision Note   Dev Sweeney  is requesting a refill authorization.  Brief Assessment and Rationale for Refill:  Approve     Medication Therapy Plan:         Comments:     Note composed:7:12 PM 07/20/2025

## 2025-07-21 NOTE — TELEPHONE ENCOUNTER
Refill Routing Note   Medication(s) are not appropriate for processing by [facility] for the following reason(s):        Outside of protocol    [facility] action(s):  Route               Appointments  past 12m or future 3m with PCP    Date Provider   Last Visit   10/14/2024 Loki Alba, DO   Next Visit   Visit date not found Loki Alba, DO   ED visits in past 90 days: 0        Note composed:9:52 AM 07/21/2025

## 2025-07-21 NOTE — TELEPHONE ENCOUNTER
No care due was identified.  [facility] Embedded Care Due Messages. Reference number: [id number].   7/21/2025 12:01:52 AM CDT

## 2025-07-29 ENCOUNTER — PATIENT MESSAGE (OUTPATIENT)
Dept: CARDIOLOGY | Facility: CLINIC | Age: 50
End: 2025-07-29

## 2025-08-06 ENCOUNTER — PATIENT MESSAGE (OUTPATIENT)
Dept: CARDIOLOGY | Facility: CLINIC | Age: 50
End: 2025-08-06

## 2025-08-06 DIAGNOSIS — I50.22 CHRONIC SYSTOLIC HEART FAILURE: ICD-10-CM

## 2025-08-20 DIAGNOSIS — G47.00 INSOMNIA, UNSPECIFIED TYPE: ICD-10-CM

## 2025-08-21 ENCOUNTER — PATIENT MESSAGE (OUTPATIENT)
Dept: INTERNAL MEDICINE | Facility: CLINIC | Age: 50
End: 2025-08-21

## 2025-08-21 RX ORDER — CLONAZEPAM 0.5 MG/1
TABLET ORAL
Qty: 45 TABLET | Refills: 3 | Status: SHIPPED | OUTPATIENT
Start: 2025-08-21

## (undated) DEVICE — GUIDEWIRE ANG STF .035INX18CM

## (undated) DEVICE — WIRE GUIDE SAFE-T-J .035 260CM

## (undated) DEVICE — GUIDEWIRE SUPRA CORE 035 190CM

## (undated) DEVICE — OMNIPAQUE 350MG 150ML VIAL

## (undated) DEVICE — CATH JACKY RADIAL 5FR 100CM

## (undated) DEVICE — SEE MEDLINE ITEM 156894

## (undated) DEVICE — KIT GLIDESHEATH SLEND 6FR 10CM

## (undated) DEVICE — SEE MEDLINE ITEM 157187

## (undated) DEVICE — KIT CUSTOM MANIFOLD

## (undated) DEVICE — CATH ANG PIGTAIL 4FR INFINITY

## (undated) DEVICE — SPIKE CONTRAST CONTROLLER

## (undated) DEVICE — HEMOSTAT VASC BAND LONG 27CM